# Patient Record
Sex: FEMALE | Race: WHITE | HISPANIC OR LATINO | Employment: PART TIME | URBAN - METROPOLITAN AREA
[De-identification: names, ages, dates, MRNs, and addresses within clinical notes are randomized per-mention and may not be internally consistent; named-entity substitution may affect disease eponyms.]

---

## 2017-01-17 ENCOUNTER — ALLSCRIPTS OFFICE VISIT (OUTPATIENT)
Dept: OTHER | Facility: OTHER | Age: 47
End: 2017-01-17

## 2017-07-10 ENCOUNTER — HOSPITAL ENCOUNTER (EMERGENCY)
Facility: HOSPITAL | Age: 47
Discharge: HOME | End: 2017-07-10
Payer: COMMERCIAL

## 2017-07-10 VITALS
TEMPERATURE: 98.9 F | DIASTOLIC BLOOD PRESSURE: 77 MMHG | RESPIRATION RATE: 18 BRPM | OXYGEN SATURATION: 100 % | WEIGHT: 160 LBS | SYSTOLIC BLOOD PRESSURE: 192 MMHG | HEART RATE: 84 BPM

## 2017-07-10 DIAGNOSIS — J20.9 BRONCHITIS, ACUTE: Primary | ICD-10-CM

## 2017-07-10 PROCEDURE — 99283 EMERGENCY DEPT VISIT LOW MDM: CPT

## 2017-09-20 ENCOUNTER — LAB REQUISITION (OUTPATIENT)
Dept: LAB | Facility: HOSPITAL | Age: 47
End: 2017-09-20
Payer: COMMERCIAL

## 2017-09-20 ENCOUNTER — ALLSCRIPTS OFFICE VISIT (OUTPATIENT)
Dept: OTHER | Facility: OTHER | Age: 47
End: 2017-09-20

## 2017-09-20 DIAGNOSIS — Z00.00 ENCOUNTER FOR GENERAL ADULT MEDICAL EXAMINATION WITHOUT ABNORMAL FINDINGS: ICD-10-CM

## 2017-09-20 DIAGNOSIS — Z72.51 HIGH RISK HETEROSEXUAL BEHAVIOR: ICD-10-CM

## 2017-09-20 DIAGNOSIS — Z12.31 ENCOUNTER FOR SCREENING MAMMOGRAM FOR MALIGNANT NEOPLASM OF BREAST: ICD-10-CM

## 2017-09-20 DIAGNOSIS — R00.2 PALPITATIONS: ICD-10-CM

## 2017-09-20 DIAGNOSIS — E55.9 VITAMIN D DEFICIENCY: ICD-10-CM

## 2017-09-20 DIAGNOSIS — R35.0 FREQUENCY OF MICTURITION: ICD-10-CM

## 2017-09-20 DIAGNOSIS — Z11.3 ENCOUNTER FOR SCREENING FOR INFECTIONS WITH PREDOMINANTLY SEXUAL MODE OF TRANSMISSION: ICD-10-CM

## 2017-09-20 DIAGNOSIS — M54.50 LOW BACK PAIN: ICD-10-CM

## 2017-09-20 DIAGNOSIS — R05.9 COUGH: ICD-10-CM

## 2017-09-20 DIAGNOSIS — R73.09 OTHER ABNORMAL GLUCOSE: ICD-10-CM

## 2017-09-20 DIAGNOSIS — E78.1 PURE HYPERGLYCERIDEMIA: ICD-10-CM

## 2017-09-20 LAB
BILIRUB UR QL STRIP: NORMAL
CLARITY UR: NORMAL
COLOR UR: YELLOW
GLUCOSE (HISTORICAL): NORMAL
HGB UR QL STRIP.AUTO: NORMAL
KETONES UR STRIP-MCNC: NORMAL MG/DL
LEUKOCYTE ESTERASE UR QL STRIP: NORMAL
NITRITE UR QL STRIP: NORMAL
PH UR STRIP.AUTO: 6.5 [PH]
PROT UR STRIP-MCNC: NORMAL MG/DL
SP GR UR STRIP.AUTO: 1.01
UROBILINOGEN UR QL STRIP.AUTO: 0.2

## 2017-09-20 PROCEDURE — 87491 CHLMYD TRACH DNA AMP PROBE: CPT | Performed by: NURSE PRACTITIONER

## 2017-09-20 PROCEDURE — 87591 N.GONORRHOEAE DNA AMP PROB: CPT | Performed by: NURSE PRACTITIONER

## 2017-09-22 LAB
CHLAMYDIA DNA CVX QL NAA+PROBE: NORMAL
N GONORRHOEA DNA GENITAL QL NAA+PROBE: NORMAL

## 2017-10-11 ENCOUNTER — APPOINTMENT (OUTPATIENT)
Dept: LAB | Facility: CLINIC | Age: 47
End: 2017-10-11
Payer: COMMERCIAL

## 2017-10-11 ENCOUNTER — TRANSCRIBE ORDERS (OUTPATIENT)
Dept: LAB | Facility: CLINIC | Age: 47
End: 2017-10-11

## 2017-10-11 DIAGNOSIS — Z11.3 ENCOUNTER FOR SCREENING FOR INFECTIONS WITH PREDOMINANTLY SEXUAL MODE OF TRANSMISSION: ICD-10-CM

## 2017-10-11 DIAGNOSIS — Z72.51 HIGH RISK HETEROSEXUAL BEHAVIOR: ICD-10-CM

## 2017-10-11 PROCEDURE — 86592 SYPHILIS TEST NON-TREP QUAL: CPT

## 2017-10-11 PROCEDURE — 87389 HIV-1 AG W/HIV-1&-2 AB AG IA: CPT

## 2017-10-11 PROCEDURE — 87340 HEPATITIS B SURFACE AG IA: CPT

## 2017-10-11 PROCEDURE — 36415 COLL VENOUS BLD VENIPUNCTURE: CPT

## 2017-10-12 LAB
HBV SURFACE AG SER QL: NORMAL
RPR SER QL: NORMAL

## 2017-10-13 LAB — HIV 1+2 AB+HIV1 P24 AG SERPL QL IA: NORMAL

## 2017-10-18 ENCOUNTER — HOSPITAL ENCOUNTER (OUTPATIENT)
Dept: MAMMOGRAPHY | Facility: HOSPITAL | Age: 47
Discharge: HOME | End: 2017-10-18
Payer: COMMERCIAL

## 2017-10-18 DIAGNOSIS — Z12.31 ENCOUNTER FOR SCREENING MAMMOGRAM FOR MALIGNANT NEOPLASM OF BREAST: ICD-10-CM

## 2017-10-18 PROCEDURE — G0202 SCR MAMMO BI INCL CAD: HCPCS

## 2017-10-19 ENCOUNTER — ALLSCRIPTS OFFICE VISIT (OUTPATIENT)
Dept: OTHER | Facility: OTHER | Age: 47
End: 2017-10-19

## 2017-10-19 ENCOUNTER — APPOINTMENT (OUTPATIENT)
Dept: LAB | Facility: HOSPITAL | Age: 47
End: 2017-10-19
Payer: COMMERCIAL

## 2017-10-19 DIAGNOSIS — R35.0 FREQUENCY OF MICTURITION: ICD-10-CM

## 2017-10-19 LAB
BACTERIA UR QL AUTO: ABNORMAL /HPF
BILIRUB UR QL STRIP: NEGATIVE
BILIRUB UR QL STRIP: NEGATIVE
CLARITY UR: ABNORMAL
CLARITY UR: NORMAL
COLOR UR: YELLOW
COLOR UR: YELLOW
GLUCOSE (HISTORICAL): NEGATIVE
GLUCOSE UR STRIP-MCNC: NEGATIVE MG/DL
HGB UR QL STRIP.AUTO: NEGATIVE
HGB UR QL STRIP.AUTO: NEGATIVE
HYALINE CASTS #/AREA URNS LPF: ABNORMAL /LPF
KETONES UR STRIP-MCNC: NEGATIVE MG/DL
KETONES UR STRIP-MCNC: NEGATIVE MG/DL
LEUKOCYTE ESTERASE UR QL STRIP: ABNORMAL
LEUKOCYTE ESTERASE UR QL STRIP: NORMAL
NITRITE UR QL STRIP: NEGATIVE
NITRITE UR QL STRIP: NEGATIVE
NON-SQ EPI CELLS URNS QL MICRO: ABNORMAL /HPF
PH UR STRIP.AUTO: 5 [PH]
PH UR STRIP.AUTO: 6 [PH] (ref 4.5–8)
PROT UR STRIP-MCNC: NEGATIVE MG/DL
PROT UR STRIP-MCNC: NEGATIVE MG/DL
RBC #/AREA URNS AUTO: ABNORMAL /HPF
SP GR UR STRIP.AUTO: 1.02
SP GR UR STRIP.AUTO: 1.03 (ref 1–1.03)
UROBILINOGEN UR QL STRIP.AUTO: 0.2
UROBILINOGEN UR QL STRIP.AUTO: 0.2 E.U./DL
WBC #/AREA URNS AUTO: ABNORMAL /HPF

## 2017-10-19 PROCEDURE — 81001 URINALYSIS AUTO W/SCOPE: CPT

## 2017-10-23 ENCOUNTER — ALLSCRIPTS OFFICE VISIT (OUTPATIENT)
Dept: OTHER | Facility: OTHER | Age: 47
End: 2017-10-23

## 2017-10-23 DIAGNOSIS — N63.0 MASS OF BREAST: ICD-10-CM

## 2017-10-25 ENCOUNTER — ALLSCRIPTS OFFICE VISIT (OUTPATIENT)
Dept: OTHER | Facility: OTHER | Age: 47
End: 2017-10-25

## 2017-10-25 ENCOUNTER — HOSPITAL ENCOUNTER (OUTPATIENT)
Dept: ULTRASOUND IMAGING | Facility: CLINIC | Age: 47
Discharge: HOME | End: 2017-10-25
Payer: COMMERCIAL

## 2017-10-25 ENCOUNTER — HOSPITAL ENCOUNTER (OUTPATIENT)
Dept: MAMMOGRAPHY | Facility: CLINIC | Age: 47
Discharge: HOME | End: 2017-10-25
Payer: COMMERCIAL

## 2017-10-25 DIAGNOSIS — R92.8 ABNORMAL SCREENING MAMMOGRAM: ICD-10-CM

## 2017-10-25 DIAGNOSIS — N63.0 MASS OF BREAST: ICD-10-CM

## 2017-10-25 PROCEDURE — G0279 TOMOSYNTHESIS, MAMMO: HCPCS

## 2017-10-25 PROCEDURE — G0206 DX MAMMO INCL CAD UNI: HCPCS

## 2017-10-25 PROCEDURE — 76642 ULTRASOUND BREAST LIMITED: CPT

## 2017-11-06 ENCOUNTER — APPOINTMENT (OUTPATIENT)
Dept: PHYSICAL THERAPY | Facility: CLINIC | Age: 47
End: 2017-11-06
Payer: COMMERCIAL

## 2017-11-06 DIAGNOSIS — M54.50 LOW BACK PAIN: ICD-10-CM

## 2017-11-06 PROCEDURE — G8979 MOBILITY GOAL STATUS: HCPCS

## 2017-11-06 PROCEDURE — 97161 PT EVAL LOW COMPLEX 20 MIN: CPT

## 2017-11-06 PROCEDURE — G8978 MOBILITY CURRENT STATUS: HCPCS

## 2017-11-09 ENCOUNTER — APPOINTMENT (OUTPATIENT)
Dept: PHYSICAL THERAPY | Facility: CLINIC | Age: 47
End: 2017-11-09
Payer: COMMERCIAL

## 2017-11-09 PROCEDURE — 97140 MANUAL THERAPY 1/> REGIONS: CPT

## 2017-11-09 PROCEDURE — 97110 THERAPEUTIC EXERCISES: CPT

## 2017-11-10 ENCOUNTER — GENERIC CONVERSION - ENCOUNTER (OUTPATIENT)
Dept: OTHER | Facility: OTHER | Age: 47
End: 2017-11-10

## 2017-11-14 ENCOUNTER — APPOINTMENT (OUTPATIENT)
Dept: PHYSICAL THERAPY | Facility: CLINIC | Age: 47
End: 2017-11-14
Payer: COMMERCIAL

## 2018-01-12 NOTE — PROGRESS NOTES
Assessment    1  Lumbago (724 2) (M54 5)   2  Encounter for preventive health examination (V70 0) (Z00 00)   3  Examination, physical, employee (V70 5) (Z02 89)    Plan  Lumbago    · Methocarbamol 750 MG Oral Tablet; TAKE 1 TABLET AT NIGHT FOR MUSCLE  SPASM   · Naproxen 500 MG Oral Tablet; TAKE ONE TABLET BY MOUTH TWICE DAILY  WITH MEALS   · *1 - [facility]  Consult  Status: Hold For - Scheduling   Requested for: 82MXB4901  Care Summary provided  : Yes   · Begin a walking program  Start with walks lasting 10 minutes and slowly work up to 30-40  minutes as pain allows ; Status:Complete;   Done: 92JFL5730   · Some eating tips that can help you lose weight ; Status:Complete;   Done: 63MOO7623   · We recommend that you avoid straining your back while lifting ; Status:Complete;   Done:  48OXV9484   · Urine Dip Non-Automated- POC; Status:Active - Perform Order; Requested  for:20Cas7893;     Discussion/Summary  health maintenance visit Currently, she eats a poor diet and has an inadequate exercise regimen  cervical cancer screening is current Breast cancer screening: mammogram is current  Colorectal cancer screening: colorectal cancer screening is not indicated  Advice and education were given regarding nutrition, aerobic exercise and weight loss  Work PE to be returned to her after PPD read  LBP has not interfered with her ability to work  I did recommend she start PT for pain  She will restart exercise program after back pain decreases  Patient is able to Self-Care  The patient was counseled regarding instructions for management, impressions  Self Referrals: No      Chief Complaint  yearly well exam  The Pt also c/o right lower that radiates to her upper right leg x 1 week  History of Present Illness  , Adult Female: The patient is being seen for a health maintenance evaluation  General Health: The patient's health since the last visit is described as good   She complains of vision problems  She denies hearing loss  Immunizations status: up to date  Lifestyle:  She does not have a healthy diet  She has weight concerns  She does not exercise regularly  She does not use tobacco  She denies alcohol use  She denies drug use  Reproductive health:  she reports normal menses  Screening: cancer screening reviewed and current  metabolic screening reviewed and current  risk screening reviewed and current  HPI: 56 y/o F here for work physical  She has been having lower back pain and right leg pain for last 1 week  She has been using cyclobenzaprine but it has not helped      Review of Systems    Constitutional: No fever, no chills, feels well, no tiredness, no recent weight gain or weight loss  Eyes: No complaints of eye pain, no red eyes, no eyesight problems, no discharge, no dry eyes, no itching of eyes  ENT: no complaints of earache, no loss of hearing, no nose bleeds, no nasal discharge, no sore throat, no hoarseness  Cardiovascular: No complaints of slow heart rate, no fast heart rate, no chest pain, no palpitations, no leg claudication, no lower extremity edema  Respiratory: No complaints of shortness of breath, no wheezing, no cough, no SOB on exertion, no orthopnea, no PND  Gastrointestinal: No complaints of abdominal pain, no constipation, no nausea or vomiting, no diarrhea, no bloody stools  Genitourinary: No complaints of dysuria, no incontinence, no pelvic pain, no dysmenorrhea, no vaginal discharge or bleeding  Musculoskeletal: No complaints of arthralgias, no myalgias, no joint swelling or stiffness, no limb pain or swelling  Integumentary: No complaints of skin rash or lesions, no itching, no skin wounds, no breast pain or lump  Neurological: No complaints of headache, no confusion, no convulsions, no numbness, no dizziness or fainting, no tingling, no limb weakness, no difficulty walking     Psychiatric: Not suicidal, no sleep disturbance, no anxiety or depression, no change in personality, no emotional problems  Endocrine: No complaints of proptosis, no hot flashes, no muscle weakness, no deepening of the voice, no feelings of weakness  Hematologic/Lymphatic: No complaints of swollen glands, no swollen glands in the neck, does not bleed easily, does not bruise easily  Active Problems    1  Abnormal blood sugar (790 29) (R73 09)   2  Anxiety (300 00) (F41 9)   3  Breast cancer screening (V76 10) (Z12 39)   4  Cervical cancer screening (V76 2) (Z12 4)   5  Encounter for gynecological examination without abnormal finding (V72 31) (Z01 419)   6  Encounter for routine gynecological examination with Papanicolaou smear of cervix   (V72 31,V76 2) (Z01 419)   7  Essential hypertriglyceridemia (272 1) (E78 1)   8  Fatigue (780 79) (R53 83)   9  Left knee pain (719 46) (M25 562)   10  Low vitamin D level (268 9) (E55 9)   11  Neck pain (723 1) (M54 2)   12  Need for pneumococcal vaccine (V03 82) (Z23)   13  Need for TD vaccine (V06 5) (Z23)   14  Need for Tdap vaccination (V06 1) (Z23)   15  Need for vaccination for pneumococcus (V03 82) (Z23)   16  Overweight (BMI 25 0-29 9) (278 02) (E66 3)   17  Pap smear for cervical cancer screening (V76 2) (Z12 4)   18  Denied: History of Patient Education - Self-Examination Of Breasts   19  Polyuria (788 42) (R35 8)   20  PPD screening test (V74 1) (Z11 1)   21  Routine Gynecological Exam With Cervical Pap Smear (V72 31)   22  Screening for depression (V79 0) (Z13 89)   23   Tingling (782 0) (R20 2)    Past Medical History    · History of Diabetes mellitus type 2 in obese (250 00,278 00) (E11 9,E66 9)   · History of Left otitis media (382 9) (H66 92)   · History of Otitis externa (380 10) (H60 90)    Surgical History    · History of  Section   · History of  Section   · History of Denial Of Any Significant Medical History   · Denied: History of Patient Education - Self-Examination Of Breasts   · History of Tubal Ligation During  Section    Family History  Mother    · Family history of Diabetes Mellitus (V18 0)   · Family history of Hypertension (V17 49)   · Family history of Lung Cancer (V16 1)  Family History    · Family history of Hyperlipidemia    Social History    · Being A Social Drinker   · Birth Control Not Practiced   · Caffeine Use   · Denied: History of Drug Use   · Former smoker (V15 82) (P20 359)   · Marital History -  (V61 03)   · Never A Smoker   · [employer] Aff[employer]   · Uses Safety Equipment - Seatbelts    Current Meds   1  Ferrous Sulfate 325 (65 Fe) MG Oral Tablet; TAKE 1 TABLET DAILY AS DIRECTED; Therapy: 00ODU5207 to (Evaluate:2017)  Requested for: 08AZQ6374; Last   Rx:59Kiz7218 Ordered   2  Vitamin D 2000 UNIT Oral Tablet; Take 1 tablet a day; Therapy: 33CSP8312 to (Roberto Carlos Lara)  Requested for: 89CIT8861; Last   Rx:11Ode5195 Ordered    Allergies    1  No Known Drug Allergies    Vitals   Recorded: 75Fjq2406 02:59PM   Temperature 97 6 F   Heart Rate 102   Respiration 18   Systolic 385   Diastolic 64   Height 5 ft 2 in   Weight 159 lb    BMI Calculated 29 08   BSA Calculated 1 73   O2 Saturation 100     Physical Exam    Constitutional   General appearance: No acute distress, well appearing and well nourished  Eyes   Conjunctiva and lids: No swelling, erythema or discharge  Pupils and irises: Equal, round and reactive to light  Ears, Nose, Mouth, and Throat   External inspection of ears and nose: Normal     Otoscopic examination: Tympanic membranes translucent with normal light reflex  Canals patent without erythema  Oropharynx: Normal with no erythema, edema, exudate or lesions  Pulmonary   Respiratory effort: No increased work of breathing or signs of respiratory distress  Auscultation of lungs: Clear to auscultation  Cardiovascular   Palpation of heart: Normal PMI, no thrills      Auscultation of heart: Normal rate and rhythm, normal S1 and S2, without murmurs  Examination of extremities for edema and/or varicosities: Normal     Abdomen   Abdomen: Non-tender, no masses  Liver and spleen: No hepatomegaly or splenomegaly  Lymphatic   Palpation of lymph nodes in neck: No lymphadenopathy  Musculoskeletal   Gait and station: Normal     Digits and nails: Normal without clubbing or cyanosis  Inspection/palpation of joints, bones, and muscles: Abnormal   (decreased ROM lumbar spine with stiffness of paraspinal muscles)   Skin   Skin and subcutaneous tissue: Normal without rashes or lesions  Neurologic   Cranial nerves: Cranial nerves 2-12 intact  Reflexes: 2+ and symmetric  Sensation: No sensory loss  Psychiatric   Orientation to person, place, and time: Normal     Mood and affect: Normal        Health Management  Breast cancer screening   Digital Bilateral Screening Mammogram With CAD; every 1 year; Last 94ECZ6584; Next  Due: 26Wfy2942; Overdue  Cervical cancer screening   (1) THIN PREP PAP WITH IMAGING; every 5 years; Last 54Vpu9178; Next Due:  15Orf6698; Active  Need for TD vaccine   Td; every 10 years; Next Due: 00Pfp4995; Overdue  Need for vaccination for pneumococcus   Pneumo (Pneumovax); every 5 years; Next Due: 58Wap0051;  Overdue    Future Appointments    Date/Time Provider Specialty Site   12/16/2016 10:00 AM Maile LIMON, Nurse Schedule  [place] [address]     Signatures   Electronically signed by : ALEK Awad; Dec 14 2016  8:18AM EST                       (Author)    Electronically signed by : AMBIKA Velázquez ; Dec 14 2016  8:28AM EST

## 2018-01-14 VITALS
OXYGEN SATURATION: 99 % | RESPIRATION RATE: 18 BRPM | HEIGHT: 62 IN | HEART RATE: 97 BPM | TEMPERATURE: 97.9 F | SYSTOLIC BLOOD PRESSURE: 150 MMHG | BODY MASS INDEX: 31 KG/M2 | DIASTOLIC BLOOD PRESSURE: 78 MMHG | WEIGHT: 168.44 LBS

## 2018-01-14 VITALS
SYSTOLIC BLOOD PRESSURE: 128 MMHG | WEIGHT: 166 LBS | DIASTOLIC BLOOD PRESSURE: 78 MMHG | BODY MASS INDEX: 30.55 KG/M2 | HEIGHT: 62 IN

## 2018-01-15 VITALS
TEMPERATURE: 99.8 F | HEART RATE: 98 BPM | OXYGEN SATURATION: 99 % | DIASTOLIC BLOOD PRESSURE: 78 MMHG | HEIGHT: 62 IN | SYSTOLIC BLOOD PRESSURE: 124 MMHG | WEIGHT: 154 LBS | RESPIRATION RATE: 20 BRPM | BODY MASS INDEX: 28.34 KG/M2

## 2018-01-16 NOTE — PROGRESS NOTES
Chief Complaint  Pt here to get PPD administer  PPD administer in [facility]  MD LARRY      Active Problems    1  Abnormal blood sugar (790 29) (R73 09)   2  Anxiety (300 00) (F41 9)   3  Breast lump (611 72) (N63 0)   4  Chronic cough (786 2) (R05)   5  Depression screening (V79 0) (Z13 89)   6  Encounter for gynecological examination without abnormal finding (V72 31) (Z01 419)   7  Encounter for PPD test (V74 1) (Z11 1)   8  Encounter for routine gynecological examination with Papanicolaou smear of cervix   (V72 31,V76 2) (Z01 419)   9  Encounter for screening mammogram for malignant neoplasm of breast (V76 12)   (Z12 31)   10  Essential hypertriglyceridemia (272 1) (E78 1)   11  Hearing loss of left ear (389 9) (H91 92)   12  Heart palpitations (785 1) (R00 2)   13  High risk sexual behavior (V69 2) (Z72 51)   14  Left knee pain (719 46) (M25 562)   15  Low vitamin D level (268 9) (E55 9)   16  Lumbago (724 2) (M54 5)   17  Neck pain (723 1) (M54 2)   18  Need for influenza vaccination (V04 81) (Z23)   19  Need for pneumococcal vaccine (V03 82) (Z23)   20  Need for TD vaccine (V06 5) (Z23)   21  Need for Tdap vaccination (V06 1) (Z23)   22  Overweight (BMI 25 0-29 9) (278 02) (E66 3)   23  Denied: History of Patient Education - Self-Examination Of Breasts   24  Physical exam, pre-employment (V70 5) (Z02 1)   25  Polyuria (788 42) (R35 8)   26  Routine Gynecological Exam With Cervical Pap Smear (V72 31)   27  Screen for STD (sexually transmitted disease) (V74 5) (Z11 3)   28  Urinary frequency (788 41) (R35 0)    Current Meds   1  No Reported Medications Recorded    Allergies    1   No Known Drug Allergies    Plan  Encounter for PPD test    · PPD    Signatures   Electronically signed by : ALEK Lau; Oct 24 2017  9:17AM EST                          Electronically signed by : AMBIKA Wong ; Oct 26 2017 10:59AM EST

## 2018-01-17 NOTE — PROGRESS NOTES
Chief Complaint  pt here today for her PPD read results are NEGATIVE @0MM SR16      Active Problems    1  Abnormal blood sugar (790 29) (R73 09)   2  Anxiety (300 00) (F41 9)   3  Breast cancer screening (V76 10) (Z12 39)   4  Cervical cancer screening (V76 2) (Z12 4)   5  Encounter for gynecological examination without abnormal finding (V72 31) (Z01 419)   6  Encounter for routine gynecological examination with Papanicolaou smear of cervix   (V72 31,V76 2) (Z01 419)   7  Essential hypertriglyceridemia (272 1) (E78 1)   8  Examination, physical, employee (V70 5) (Z02 89)   9  Fatigue (780 79) (R53 83)   10  Left knee pain (719 46) (M25 562)   11  Low vitamin D level (268 9) (E55 9)   12  Lumbago (724 2) (M54 5)   13  Neck pain (723 1) (M54 2)   14  Need for pneumococcal vaccine (V03 82) (Z23)   15  Need for TD vaccine (V06 5) (Z23)   16  Need for Tdap vaccination (V06 1) (Z23)   17  Need for vaccination for pneumococcus (V03 82) (Z23)   18  Overweight (BMI 25 0-29 9) (278 02) (E66 3)   19  Pap smear for cervical cancer screening (V76 2) (Z12 4)   20  Denied: History of Patient Education - Self-Examination Of Breasts   21  Polyuria (788 42) (R35 8)   22  PPD screening test (V74 1) (Z11 1)   23  Routine Gynecological Exam With Cervical Pap Smear (V72 31)   24  Screening for depression (V79 0) (Z13 89)   25  Tingling (782 0) (R20 2)    Current Meds   1  Ferrous Sulfate 325 (65 Fe) MG Oral Tablet; TAKE 1 TABLET DAILY AS DIRECTED; Therapy: 94ZQG9564 to (Evaluate:15Apr2017)  Requested for: 53BNA5354; Last   Rx:24Haq5422 Ordered   2  Methocarbamol 750 MG Oral Tablet; TAKE 1 TABLET AT NIGHT FOR MUSCLE SPASM; Therapy: 86QFY9371 to (Evaluate:12Jan2017)  Requested for: 29Qvq7434; Last   Rx:59Odr1489 Ordered   3  Naproxen 500 MG Oral Tablet; TAKE ONE TABLET BY MOUTH TWICE DAILY WITH   MEALS; Therapy: 35ZXC1611 to (Evaluate:12Jan2017)  Requested for: 91Asv3626; Last   Rx:97Jwc9182 Ordered   4   Vitamin D 2000 UNIT Oral Tablet; Take 1 tablet a day; Therapy: 02UBM5009 to (Perico Drew)  Requested for: 13AOM2881; Last   Rx:17Oct2016 Ordered    Allergies    1   No Known Drug Allergies    Plan  Lumbago    · *1 - [facility]  Consult  Status: Active  Requested for:  91QDM2269  Care Summary provided  : Yes  PPD screening test    · PPD    Signatures   Electronically signed by : ALEK Nash; Dec 19 2016 12:29PM EST                       (Author)    Electronically signed by : AMBIKA Story ; Dec 19 2016  2:02PM EST

## 2018-01-18 NOTE — PROGRESS NOTES
Assessment    1  Chronic cough (786 2) (R05)   2  Heart palpitations (785 1) (R00 2)   3  Hearing loss of left ear (389 9) (H91 92)   4  Abnormal blood sugar (790 29) (R73 09)   5  Urinary frequency (788 41) (R35 0)    Plan  Abnormal blood sugar    · (1) COMPREHENSIVE METABOLIC PANEL; Status:Active; Requested for:19Oct2017;   Chronic cough    · * XR CHEST PA & LATERAL; Status:Active; Requested TBE:[id number];    · Complete PFT; Status:Active; Requested KKW:[id number];   Depression screening    · *VB-Depression Screening; Status:Complete;   Done: 86CGO0232 01:08PM  Essential hypertriglyceridemia    · (1) LIPID PANEL, FASTING; Status:Active; Requested Merged with [facility]:[id number]; Health Maintenance, Physical exam, pre-employment    · Urine Dip Non-Automated- POC; Status:Complete;   Done: 14FWS7657 01:15PM  Hearing loss of left ear    · *1 - [facility]  *  Status: Hold For - Scheduling   Requested for: 22RGQ1486  Care Summary provided  : Yes  Heart palpitations    · (1) CBC/PLT/DIFF; Status:Active; Requested for:19Oct2017;   Low vitamin D level    · (1) VITAMIN D 25-HYDROXY; Status:Active; Requested ISU:[id number];   Lumbago    · *1 - [facility]  *  Status: Active  Requested for: 87JOH4469  Care Summary provided  : Yes  Need for influenza vaccination    · Stop: Flulaval Quadrivalent 0 5 ML Intramuscular Suspension Prefilled  Syringe   · Flulaval Quadrivalent Intramuscular Suspension  Urinary frequency    · (1) URINALYSIS w URINE C/S REFLEX (will reflex a microscopy if leukocytes, occult  blood, or nitrites are not within normal limits); Status:Active; Requested [facility]:[id number];     Discussion/Summary  health maintenance visit Currently, she eats a poor diet and has an inadequate exercise regimen  cervical cancer screening is current Breast cancer screening: mammogram is current  Colorectal cancer screening: colorectal cancer screening is not indicated       We will get blood work done to make sure she is not diabetic  After blood work should be referred to nutritionist  We did discuss briefly that she should try decrease carbohydrates and Rice her diet  She was discharged start physical therapy for back pain  She will need approximately on as-needed basis  She is referred to [facility] for hearing loss in the left side  It is unlikely to be anything due to fix this problem at this time  Possible side effects of new medications were reviewed with the patient/guardian today  The treatment plan was reviewed with the patient/guardian  The patient/guardian understands and agrees with the treatment plan     Self Referrals: No      Chief Complaint  pt here today for physical for work will need PPD placement states we have her form? History of Present Illness  HM, Adult Female: The patient is being seen for a health maintenance evaluation  General Health: The patient's health since the last visit is described as good  She denies vision problems  She has hearing loss  Lifestyle:  She consumes a diverse and healthy diet  She has weight concerns  She does not exercise regularly  She does not use tobacco  She denies drug use  Reproductive health: the patient is premenopausal    Screening:   HPI: Daquan Lozano is a 51 y/o female, w/a history of iron def anemia and pre-diabetes, presenting for a work physical and is complaining of fatigue x1 month, weight gain, episodes of hypoglycemia and persistent dry cough  She states she is trying to lose weight and is eating less but is eating multiple times a day  Experiencing episodes of hypoglycemia , where she has blurry vision, weakness, palpitation, and SOB  She then eats and feels better  Pt interested in nutritionist referral    Pt stopped smoking 2 years ago and continues having dry cough multiple times a day  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   She states the cough occurs during the day and at night when she is home  She also sometimes when she is walking quickly to feel short of breath  She also needs to get lower back pain which is worse on right  He does sometimes take naproxen which helps her with pain  Occasionally the pain radiates into the hip area  No lower extremity neuropathy symptoms  She has not had any treatment for this  She also notes left-sided hearing loss  This occurred when she had an ear infection which is pregnant  This was about 20 years ago  Review of Systems    Constitutional: recent 15 lb weight gain and feeling tired, but no fever and no chills  Eyes: eyesight problems, but no eye pain, eyes not red and no itching of the eyes  ENT: hearing loss, but no complaints of earache, no loss of hearing, no nose bleeds, no nasal discharge, no sore throat, no hoarseness  Cardiovascular: No complaints of slow heart rate, no fast heart rate, no chest pain, no palpitations, no leg claudication, no lower extremity edema  The patient presents with complaints of cough  Her symptoms are caused by allergen contact  Symptoms are made worse by smoke exposure  Symptoms are unchanged  Pertinent Medical History: environmental allergies and obesity, but not asthma  Family History: asthma  Gastrointestinal: No complaints of abdominal pain, no constipation, no nausea or vomiting, no diarrhea, no bloody stools  Genitourinary: No complaints of dysuria, no incontinence, no pelvic pain, no dysmenorrhea, no vaginal discharge or bleeding  Musculoskeletal: No complaints of arthralgias, no myalgias, no joint swelling or stiffness, no limb pain or swelling  Integumentary: No complaints of skin rash or lesions, no itching, no skin wounds, no breast pain or lump  Neurological: dizziness, but no numbness, no tingling, no confusion, no limb weakness, no convulsions, no fainting and no difficulty walking     Psychiatric: Not suicidal, no sleep disturbance, no anxiety or depression, no change in personality, no emotional problems  Endocrine: No complaints of proptosis, no hot flashes, no muscle weakness, no deepening of the voice, no feelings of weakness  Hematologic/Lymphatic: No complaints of swollen glands, no swollen glands in the neck, does not bleed easily, does not bruise easily  Active Problems    1  Abnormal blood sugar (790 29) (R73 09)   2  Anxiety (300 00) (F41 9)   3  Encounter for gynecological examination without abnormal finding (V72 31) (Z01 419)   4  Encounter for routine gynecological examination with Papanicolaou smear of cervix   (V72 31,V76 2) (Z01 419)   5  Encounter for screening mammogram for malignant neoplasm of breast (V76 12)   (Z12 31)   6  Essential hypertriglyceridemia (272 1) (E78 1)   7  High risk sexual behavior (V69 2) (Z72 51)   8  Left knee pain (719 46) (M25 562)   9  Low vitamin D level (268 9) (E55 9)   10  Lumbago (724 2) (M54 5)   11  Neck pain (723 1) (M54 2)   12  Need for pneumococcal vaccine (V03 82) (Z23)   13  Need for TD vaccine (V06 5) (Z23)   14  Need for Tdap vaccination (V06 1) (Z23)   15  Overweight (BMI 25 0-29 9) (278 02) (E66 3)   16  Denied: History of Patient Education - Self-Examination Of Breasts   17  Polyuria (788 42) (R35 8)   18  Routine Gynecological Exam With Cervical Pap Smear (V72 31)   19   Screen for STD (sexually transmitted disease) (V74 5) (Z11 3)    Past Medical History    · History of Diabetes mellitus type 2 in obese (250 00,278 00) (E11 69,E66 9)   · History of Left otitis media (382 9) (H66 92)   · History of Otitis externa (380 10) (H60 90)    Surgical History    · History of  Section   · History of  Section   · History of Denial Of Any Significant Medical History   · Denied: History of Patient Education - Self-Examination Of Breasts   · History of Tubal Ligation During  Section    Family History  Mother    · Family history of Diabetes Mellitus (V18 0)   · Family history of Hypertension (V17 49)   · Family history of Lung Cancer (V16 1)  Family History    · Family history of Hyperlipidemia    Social History    · Being A Social Drinker   · Birth Control Not Practiced   · Caffeine Use   · Denied: History of Drug Use   · Former smoker (V15 82) (D59 608)   · High risk sexual behavior (V69 2) (Z72 51)   · Marital History -  (V61 03)   · Never A Smoker   · [address]   · Uses Safety Equipment - Seatbelts    Current Meds   1  No Reported Medications Recorded    Allergies    1  No Known Drug Allergies    Vitals   Recorded: 19Oct2017 01:02PM   Temperature 97 9 F, Tympanic   Heart Rate 97   Respiration 18   Systolic 498, LUE, Sitting   Diastolic 78, LUE, Sitting   Height 5 ft 2 in   Weight 168 lb 7 oz   BMI Calculated 30 81   BSA Calculated 1 78   O2 Saturation 99     Physical Exam    Constitutional   General appearance: No acute distress, well appearing and well nourished  Head and Face   Head and face: Normal     Palpation of the face and sinuses: No sinus tenderness  Eyes   Conjunctiva and lids: No swelling, erythema or discharge  Pupils and irises: Equal, round, reactive to light  Ears, Nose, Mouth, and Throat   External inspection of ears and nose: Normal     Lips, teeth, and gums: Normal, good dentition  Oropharynx: Normal with no erythema, edema, exudate or lesions  Neck   Neck: Supple, symmetric, trachea midline, no masses  Thyroid: Normal, no thyromegaly  Pulmonary   Respiratory effort: No increased work of breathing or signs of respiratory distress  Cardiovascular   Palpation of heart: Normal PMI, no thrills  Auscultation of heart: Normal rate and rhythm, normal S1 and S2, no murmurs  Examination of extremities for edema and/or varicosities: Normal     Abdomen   Abdomen: Non-tender, no masses  Liver and spleen: No hepatomegaly or splenomegaly  Lymphatic   Palpation of lymph nodes in neck: No lymphadenopathy      Musculoskeletal Gait and station: Normal     Digits and nails: Normal without clubbing or cyanosis  Joints, bones, and muscles: Abnormal   (Tender over right paraspinal muscles in the thoracic and lumbar region)   Range of motion: Normal     Stability: Normal     Skin   Skin and subcutaneous tissue: Normal without rashes or lesions  Psychiatric   Judgment and insight: Normal     Orientation to person, place, and time: Normal     Mood and affect: Normal        Results/Data  Urine Dip Non-Automated- POC [id number] 01:15PM PicaRiley     Test Name Result Flag Reference   Color Yellow     Clarity Transparent     Leukocytes SMALL     Nitrite NEGATIVE     Blood NEGATIVE     Bilirubin NEGATIVE     Urobilinogen 0 2     Protein NEGATIVE     Ph 5 0     Specific Gravity 1 025     Ketone NEGATIVE     Glucose NEGATIVE       *VB-Depression Screening [id number] 01:08PM Pica, Richele     Test Name Result Flag Reference   Depression Scale Result      Depression Screen - Negative For Symptoms     PHQ-2 Adult Depression Screening 12SEX6023 01:06PM User s     Test Name Result Flag Reference   PHQ-2 Adult Depression Score 0     Over the last two weeks, how often have you been bothered by any of the following problems? Little interest or pleasure in doing things: Not at all - 0  Feeling down, depressed, or hopeless: Not at all - 0   PHQ-2 Adult Depression Screening Negative         Procedure    Procedure: Hearing Acuity Test    Indication: Routine screeing  Audiometry:   Hearing in the right ear: 20 decibals at 500 hertz, 30 decibals at 1000 hertz, 20 decibals at 2000 hertz, 20 decibals at 4000 hertz and 35 decibals at 6000 hertz  Hearing in the left ear: 65 decibals at 500 hertz, 55 decibals at 1000 hertz, 55 decibals at 2000 hertz, 65 decibals at 4000 hertz and 55 decibals at 6000 hertz  The patient was cooperative, but Tolerated the procedure well  Procedure: Visual Acuity Test    Indication: routine screening     Inforrmation supplied by laura a Snellen chart  Results: 20/50-1 in the right eye without corrective device, 20/40-2 in the left eye without corrective device   The patient was cooperative, but tolerated the procedure well  Health Management  History of Breast cancer screening   Digital Bilateral Screening Mammogram With CAD; every 1 year; Last 60HWW2461; Next  Due: 76Vfv5881; Overdue  History of Cervical cancer screening   (1) THIN PREP PAP WITH IMAGING; every 5 years; Last 21Apr2016; Next Due:  37Eeu8242; Active  History of pneumococcal vaccination   Pneumo (Pneumovax); every 5 years; Next Due: 99Knc9704; Overdue  Need for TD vaccine   Td; every 10 years; Next Due: 01Stg1808;  Overdue    Signatures   Electronically signed by : ALEK Coombs; Oct 19 2017  2:57PM EST                       (Author)    Electronically signed by : AMBIKA Dunaway ; Oct 19 2017  3:46PM EST

## 2018-01-18 NOTE — PROGRESS NOTES
Chief Complaint  pT HERE TO GET PPD READ  PPD NEGATIVE @ 0 MM  MD LARRY      Active Problems    1  Abnormal blood sugar (790 29) (R73 09)   2  Anxiety (300 00) (F41 9)   3  Breast lump (611 72) (N63 0)   4  Chronic cough (786 2) (R05)   5  Depression screening (V79 0) (Z13 89)   6  Encounter for gynecological examination without abnormal finding (V72 31) (Z01 419)   7  Encounter for PPD test (V74 1) (Z11 1)   8  Encounter for routine gynecological examination with Papanicolaou smear of cervix   (V72 31,V76 2) (Z01 419)   9  Encounter for screening mammogram for malignant neoplasm of breast (V76 12)   (Z12 31)   10  Essential hypertriglyceridemia (272 1) (E78 1)   11  Hearing loss of left ear (389 9) (H91 92)   12  Heart palpitations (785 1) (R00 2)   13  High risk sexual behavior (V69 2) (Z72 51)   14  Left knee pain (719 46) (M25 562)   15  Low vitamin D level (268 9) (E55 9)   16  Lumbago (724 2) (M54 5)   17  Neck pain (723 1) (M54 2)   18  Need for influenza vaccination (V04 81) (Z23)   19  Need for pneumococcal vaccine (V03 82) (Z23)   20  Need for TD vaccine (V06 5) (Z23)   21  Need for Tdap vaccination (V06 1) (Z23)   22  Overweight (BMI 25 0-29 9) (278 02) (E66 3)   23  Denied: History of Patient Education - Self-Examination Of Breasts   24  Physical exam, pre-employment (V70 5) (Z02 1)   25  Polyuria (788 42) (R35 8)   26  Routine Gynecological Exam With Cervical Pap Smear (V72 31)   27  Screen for STD (sexually transmitted disease) (V74 5) (Z11 3)   28  Urinary frequency (788 41) (R35 0)    Current Meds   1  No Reported Medications Recorded    Allergies    1   No Known Drug Allergies    Plan  Encounter for PPD test    · PPD  PMH: PPD screening test    · PPD    Signatures   Electronically signed by : AMBIKA Mendoza ; Oct 25 2017  5:05PM EST                       (Author)

## 2018-02-01 ENCOUNTER — OFFICE VISIT (OUTPATIENT)
Dept: FAMILY MEDICINE CLINIC | Facility: CLINIC | Age: 48
End: 2018-02-01
Payer: COMMERCIAL

## 2018-02-01 VITALS
BODY MASS INDEX: 30.69 KG/M2 | HEIGHT: 62 IN | HEART RATE: 88 BPM | TEMPERATURE: 98.7 F | WEIGHT: 166.8 LBS | OXYGEN SATURATION: 98 % | DIASTOLIC BLOOD PRESSURE: 80 MMHG | SYSTOLIC BLOOD PRESSURE: 122 MMHG | RESPIRATION RATE: 18 BRPM

## 2018-02-01 DIAGNOSIS — M54.9 UPPER BACK PAIN: ICD-10-CM

## 2018-02-01 DIAGNOSIS — R05.3 CHRONIC COUGH: Primary | ICD-10-CM

## 2018-02-01 DIAGNOSIS — M54.50 CHRONIC BILATERAL LOW BACK PAIN WITHOUT SCIATICA: ICD-10-CM

## 2018-02-01 DIAGNOSIS — M54.2 NECK PAIN: ICD-10-CM

## 2018-02-01 DIAGNOSIS — E78.1 ESSENTIAL HYPERTRIGLYCERIDEMIA: ICD-10-CM

## 2018-02-01 DIAGNOSIS — G89.29 CHRONIC BILATERAL LOW BACK PAIN WITHOUT SCIATICA: ICD-10-CM

## 2018-02-01 DIAGNOSIS — R79.89 LOW VITAMIN D LEVEL: ICD-10-CM

## 2018-02-01 DIAGNOSIS — R53.83 FATIGUE, UNSPECIFIED TYPE: ICD-10-CM

## 2018-02-01 DIAGNOSIS — Z23 NEED FOR HEPATITIS B VACCINATION: ICD-10-CM

## 2018-02-01 PROBLEM — R35.0 INCREASED FREQUENCY OF URINATION: Status: ACTIVE | Noted: 2017-10-19

## 2018-02-01 PROBLEM — H91.92 HEARING LOSS OF LEFT EAR: Status: ACTIVE | Noted: 2017-10-19

## 2018-02-01 PROBLEM — R00.2 HEART PALPITATIONS: Status: ACTIVE | Noted: 2017-10-19

## 2018-02-01 PROCEDURE — 90471 IMMUNIZATION ADMIN: CPT

## 2018-02-01 PROCEDURE — 99213 OFFICE O/P EST LOW 20 MIN: CPT | Performed by: PHYSICIAN ASSISTANT

## 2018-02-01 PROCEDURE — 90746 HEPB VACCINE 3 DOSE ADULT IM: CPT

## 2018-02-01 RX ORDER — BACLOFEN 10 MG/1
10 TABLET ORAL
Qty: 30 TABLET | Refills: 1 | Status: SHIPPED | OUTPATIENT
Start: 2018-02-01 | End: 2018-09-11

## 2018-02-01 RX ORDER — OMEPRAZOLE 20 MG/1
20 CAPSULE, DELAYED RELEASE ORAL DAILY
Qty: 30 CAPSULE | Refills: 1 | Status: SHIPPED | OUTPATIENT
Start: 2018-02-01 | End: 2019-02-11

## 2018-02-01 NOTE — PATIENT INSTRUCTIONS

## 2018-02-01 NOTE — PROGRESS NOTES
Assessment/Plan:    No problem-specific Assessment & Plan notes found for this encounter  Problem List Items Addressed This Visit     None            Subjective:      Patient ID: Jennifer Sanchez is a 52 y o  female  51 y/o F here for follow up of fatigue and chronic cough  Seen here for both in October and had CXR, labs and PFTs ordered which were not completed  She tried benadryl and it did not help  She quit smoking about 3 years ago  Notes SOB with exertion  Also has been tired  Eating ice frequently  Hx of iron and vitamin d def  She does get heavy periods and it lasts for about 5 days with first day having blood clots  Also still continues to get back pain  Worse at night and when laying/sitting unsupported  It is upper and lower back  No radiculopathy  Tried PT x 2 but not doing exercises  Fatigue   This is a chronic problem  Associated symptoms include coughing, fatigue, headaches (mild headaches) and nausea  Pertinent negatives include no abdominal pain, arthralgias, chest pain, chills, myalgias, rash, vertigo or vomiting  Sore throat: when walking quickly  Treatments tried: eating ice frequently  Cough   This is a chronic problem  The current episode started more than 1 year ago (2 years)  The cough is non-productive  Associated symptoms include headaches (mild headaches) and shortness of breath  Pertinent negatives include no chest pain, chills, ear pain, myalgias, rash or wheezing  Sore throat: when walking quickly  The following portions of the patient's history were reviewed and updated as appropriate:   She  has no past medical history on file  She  does not have any pertinent problems on file  She  has no past surgical history on file  Her family history is not on file  She  reports that she has quit smoking  She has never used smokeless tobacco  She reports that she does not drink alcohol or use drugs  No current outpatient prescriptions on file       No current facility-administered medications for this visit  No current outpatient prescriptions on file prior to visit  No current facility-administered medications on file prior to visit  She has No Known Allergies       Review of Systems   Constitutional: Positive for fatigue  Negative for activity change, appetite change, chills and unexpected weight change  HENT: Negative for dental problem, ear pain and hearing loss  Sore throat: when walking quickly  Eyes: Negative for visual disturbance  Respiratory: Positive for cough and shortness of breath  Negative for wheezing  Cardiovascular: Negative for chest pain  Gastrointestinal: Positive for nausea  Negative for abdominal pain, constipation, diarrhea and vomiting  Genitourinary: Negative for difficulty urinating and dysuria  Musculoskeletal: Positive for back pain  Negative for arthralgias and myalgias  Skin: Negative for rash  Neurological: Positive for headaches (mild headaches)  Negative for dizziness and vertigo  Psychiatric/Behavioral: Negative for behavioral problems  Objective:     Physical Exam   Constitutional: She is oriented to person, place, and time  She appears well-developed and well-nourished  No distress  HENT:   Head: Normocephalic and atraumatic  Right Ear: External ear normal    Left Ear: External ear normal    Eyes: Conjunctivae are normal    Neck: Normal range of motion  Neck supple  No thyromegaly present  Cardiovascular: Normal rate, regular rhythm and normal heart sounds  No murmur heard  Pulmonary/Chest: Breath sounds normal  No respiratory distress  She has no wheezes  Abdominal: Soft  Bowel sounds are normal  There is no tenderness  Musculoskeletal: Normal range of motion  She exhibits no edema, tenderness or deformity  Lymphadenopathy:     She has no cervical adenopathy  Neurological: She is alert and oriented to person, place, and time  Psychiatric: She has a normal mood and affect  Her behavior is normal    Nursing note and vitals reviewed

## 2018-02-02 NOTE — PROGRESS NOTES
I have reviewed the notes, assessments, and/or procedures performed by AP, I concur with her/his documentation of Artice Page

## 2018-02-19 ENCOUNTER — APPOINTMENT (OUTPATIENT)
Dept: LAB | Facility: HOSPITAL | Age: 48
End: 2018-02-19
Payer: COMMERCIAL

## 2018-02-19 ENCOUNTER — HOSPITAL ENCOUNTER (OUTPATIENT)
Dept: RADIOLOGY | Facility: HOSPITAL | Age: 48
Discharge: HOME | End: 2018-02-19
Payer: COMMERCIAL

## 2018-02-19 DIAGNOSIS — R53.83 FATIGUE, UNSPECIFIED TYPE: ICD-10-CM

## 2018-02-19 DIAGNOSIS — R79.89 LOW VITAMIN D LEVEL: ICD-10-CM

## 2018-02-19 DIAGNOSIS — E78.1 ESSENTIAL HYPERTRIGLYCERIDEMIA: ICD-10-CM

## 2018-02-19 DIAGNOSIS — R05.3 CHRONIC COUGH: ICD-10-CM

## 2018-02-19 LAB
25(OH)D3 SERPL-MCNC: 8.3 NG/ML (ref 30–100)
ALBUMIN SERPL BCP-MCNC: 3.6 G/DL (ref 3.5–5)
ALP SERPL-CCNC: 56 U/L (ref 46–116)
ALT SERPL W P-5'-P-CCNC: 18 U/L (ref 12–78)
ANION GAP SERPL CALCULATED.3IONS-SCNC: 8 MMOL/L (ref 4–13)
AST SERPL W P-5'-P-CCNC: 16 U/L (ref 5–45)
BASOPHILS # BLD AUTO: 0.04 THOUSANDS/ΜL (ref 0–0.1)
BASOPHILS NFR BLD AUTO: 1 % (ref 0–1)
BILIRUB SERPL-MCNC: 0.33 MG/DL (ref 0.2–1)
BUN SERPL-MCNC: 12 MG/DL (ref 5–25)
CALCIUM SERPL-MCNC: 9.1 MG/DL (ref 8.3–10.1)
CHLORIDE SERPL-SCNC: 104 MMOL/L (ref 100–108)
CHOLEST SERPL-MCNC: 214 MG/DL (ref 50–200)
CO2 SERPL-SCNC: 29 MMOL/L (ref 21–32)
CREAT SERPL-MCNC: 0.73 MG/DL (ref 0.6–1.3)
EOSINOPHIL # BLD AUTO: 0.12 THOUSAND/ΜL (ref 0–0.61)
EOSINOPHIL NFR BLD AUTO: 2 % (ref 0–6)
ERYTHROCYTE [DISTWIDTH] IN BLOOD BY AUTOMATED COUNT: 15.2 % (ref 11.6–15.1)
FERRITIN SERPL-MCNC: 2 NG/ML (ref 8–388)
GFR SERPL CREATININE-BSD FRML MDRD: 98 ML/MIN/1.73SQ M
GLUCOSE P FAST SERPL-MCNC: 97 MG/DL (ref 65–99)
HCT VFR BLD AUTO: 31.2 % (ref 34.8–46.1)
HDLC SERPL-MCNC: 63 MG/DL (ref 40–60)
HGB BLD-MCNC: 9.8 G/DL (ref 11.5–15.4)
IRON SATN MFR SERPL: 5 %
IRON SERPL-MCNC: 26 UG/DL (ref 50–170)
LDLC SERPL CALC-MCNC: 99 MG/DL (ref 0–100)
LYMPHOCYTES # BLD AUTO: 1.66 THOUSANDS/ΜL (ref 0.6–4.47)
LYMPHOCYTES NFR BLD AUTO: 28 % (ref 14–44)
MCH RBC QN AUTO: 26.8 PG (ref 26.8–34.3)
MCHC RBC AUTO-ENTMCNC: 31.4 G/DL (ref 31.4–37.4)
MCV RBC AUTO: 85 FL (ref 82–98)
MONOCYTES # BLD AUTO: 0.54 THOUSAND/ΜL (ref 0.17–1.22)
MONOCYTES NFR BLD AUTO: 9 % (ref 4–12)
NEUTROPHILS # BLD AUTO: 3.49 THOUSANDS/ΜL (ref 1.85–7.62)
NEUTS SEG NFR BLD AUTO: 60 % (ref 43–75)
PLATELET # BLD AUTO: 440 THOUSANDS/UL (ref 149–390)
PMV BLD AUTO: 10.4 FL (ref 8.9–12.7)
POTASSIUM SERPL-SCNC: 4.4 MMOL/L (ref 3.5–5.3)
PROT SERPL-MCNC: 7.7 G/DL (ref 6.4–8.2)
RBC # BLD AUTO: 3.66 MILLION/UL (ref 3.81–5.12)
SODIUM SERPL-SCNC: 141 MMOL/L (ref 136–145)
TIBC SERPL-MCNC: 499 UG/DL (ref 250–450)
TRIGL SERPL-MCNC: 258 MG/DL
VIT B12 SERPL-MCNC: 241 PG/ML (ref 100–900)
WBC # BLD AUTO: 5.85 THOUSAND/UL (ref 4.31–10.16)

## 2018-02-19 PROCEDURE — 80061 LIPID PANEL: CPT

## 2018-02-19 PROCEDURE — 82728 ASSAY OF FERRITIN: CPT

## 2018-02-19 PROCEDURE — 83550 IRON BINDING TEST: CPT

## 2018-02-19 PROCEDURE — 80053 COMPREHEN METABOLIC PANEL: CPT

## 2018-02-19 PROCEDURE — 82306 VITAMIN D 25 HYDROXY: CPT

## 2018-02-19 PROCEDURE — 83540 ASSAY OF IRON: CPT

## 2018-02-19 PROCEDURE — 36415 COLL VENOUS BLD VENIPUNCTURE: CPT

## 2018-02-19 PROCEDURE — 71046 X-RAY EXAM CHEST 2 VIEWS: CPT

## 2018-02-19 PROCEDURE — 82607 VITAMIN B-12: CPT

## 2018-02-19 PROCEDURE — 85025 COMPLETE CBC W/AUTO DIFF WBC: CPT

## 2018-02-20 DIAGNOSIS — D50.9 IRON DEFICIENCY ANEMIA, UNSPECIFIED IRON DEFICIENCY ANEMIA TYPE: ICD-10-CM

## 2018-02-20 DIAGNOSIS — E78.1 ESSENTIAL HYPERTRIGLYCERIDEMIA: ICD-10-CM

## 2018-02-20 DIAGNOSIS — E53.8 B12 DEFICIENCY: ICD-10-CM

## 2018-02-20 DIAGNOSIS — R79.89 LOW VITAMIN D LEVEL: Primary | ICD-10-CM

## 2018-02-20 RX ORDER — FERROUS SULFATE 325(65) MG
325 TABLET ORAL 2 TIMES DAILY WITH MEALS
Qty: 60 TABLET | Refills: 5 | Status: SHIPPED | OUTPATIENT
Start: 2018-02-20 | End: 2019-01-28 | Stop reason: SDUPTHER

## 2018-02-20 RX ORDER — ERGOCALCIFEROL 1.25 MG/1
50000 CAPSULE ORAL WEEKLY
Qty: 4 CAPSULE | Refills: 0 | Status: SHIPPED | OUTPATIENT
Start: 2018-02-20 | End: 2019-01-28 | Stop reason: SDUPTHER

## 2018-02-20 RX ORDER — CHLORAL HYDRATE 500 MG
1000 CAPSULE ORAL 3 TIMES DAILY
Qty: 90 CAPSULE | Refills: 5 | Status: SHIPPED | OUTPATIENT
Start: 2018-02-20 | End: 2019-02-11

## 2018-02-20 RX ORDER — UREA 10 %
100 LOTION (ML) TOPICAL DAILY
Qty: 30 TABLET | Refills: 5 | Status: SHIPPED | OUTPATIENT
Start: 2018-02-20 | End: 2019-01-28

## 2018-02-26 ENCOUNTER — HOSPITAL ENCOUNTER (OUTPATIENT)
Dept: PULMONOLOGY | Facility: HOSPITAL | Age: 48
Discharge: HOME | End: 2018-02-26
Payer: COMMERCIAL

## 2018-02-26 DIAGNOSIS — R05.3 CHRONIC COUGH: ICD-10-CM

## 2018-02-26 PROCEDURE — 94010 BREATHING CAPACITY TEST: CPT

## 2018-02-26 PROCEDURE — 94010 BREATHING CAPACITY TEST: CPT | Performed by: INTERNAL MEDICINE

## 2018-02-26 RX ORDER — ALBUTEROL SULFATE 2.5 MG/3ML
2.5 SOLUTION RESPIRATORY (INHALATION) ONCE AS NEEDED
Status: DISCONTINUED | OUTPATIENT
Start: 2018-02-26 | End: 2018-03-02 | Stop reason: HOSPADM

## 2018-02-28 NOTE — PROGRESS NOTES
pft testing was normal   I recommend that she make sure she get regular exercise and it her sob should improve

## 2018-03-01 ENCOUNTER — EVALUATION (OUTPATIENT)
Dept: PHYSICAL THERAPY | Facility: CLINIC | Age: 48
End: 2018-03-01
Payer: COMMERCIAL

## 2018-03-01 DIAGNOSIS — M54.9 UPPER BACK PAIN: Primary | ICD-10-CM

## 2018-03-01 DIAGNOSIS — M54.50 CHRONIC BILATERAL LOW BACK PAIN WITHOUT SCIATICA: ICD-10-CM

## 2018-03-01 DIAGNOSIS — G89.29 CHRONIC BILATERAL LOW BACK PAIN WITHOUT SCIATICA: ICD-10-CM

## 2018-03-01 PROCEDURE — G8990 OTHER PT/OT CURRENT STATUS: HCPCS | Performed by: PHYSICAL THERAPIST

## 2018-03-01 PROCEDURE — 97162 PT EVAL MOD COMPLEX 30 MIN: CPT | Performed by: PHYSICAL THERAPIST

## 2018-03-01 PROCEDURE — G8991 OTHER PT/OT GOAL STATUS: HCPCS | Performed by: PHYSICAL THERAPIST

## 2018-03-01 NOTE — PROGRESS NOTES
PT Evaluation     Today's date: 3/1/2018  Patient name: Jessika Tsang  : 1970  MRN: [id number]  Referring provider: Razia Leroy PA-C  Dx:   Encounter Diagnosis     ICD-10-CM    1  Upper back pain M54 9 Ambulatory referral to Physical Therapy   2  Chronic bilateral low back pain without sciatica M54 5 Ambulatory referral to Physical Therapy    G89 29                   Assessment  Impairments: abnormal gait, abnormal or restricted ROM, impaired physical strength and pain with function    Assessment details: Jessika Tsang is a 52 y o  female who presents to physical therapy with thoracic and lumbar pain with radicular symptoms to L lower extremity  Pt has deficits in lumbar AROM and L lower extremity strength  Pt has poor stability of L sacroiliac joint  Pt has deficits in posture and ambulation in addition to core stability  Pt has difficulty with prolonged standing, sitting, and lifting clients for work  Jessika Tsang would benefit from formal physical therapy to address impairments as detailed, decrease pain, and restore maximal level of function for all home, work, and mobility tasks  Thank you for this referral     Understanding of Dx/Px/POC: good   Prognosis: good    Goals  Short-term goals:  1  Pt will decrease pain by 1-2 points within 4 weeks  2  Pt will improve ROM by 5-10 degrees within 4 weeks  3  Pt will improve strength by 1/2 grade within 4 weeks  4  Pt will improve physical FS score by 7 points by discharge  Long-term goals:  1  Pt will demonstrate independence with HEP by discharge  2  Pt will return to all lifting tasks with good body mechanics and pain <3/10 by discharge  3  Pt will walk >3 blocks with pain <3/10 by discharge      Plan  Patient would benefit from: PT eval and skilled PT  Planned modality interventions: cryotherapy, thermotherapy: hydrocollator packs and TENS  Planned therapy interventions: joint mobilization, manual therapy, neuromuscular re-education, patient education, strengthening, stretching, therapeutic exercise, flexibility, functional ROM exercises, home exercise program, abdominal trunk stabilization and body mechanics training  Frequency: 2x week  Duration in weeks: 4  Treatment plan discussed with: patient        Subjective Evaluation    History of Present Illness  Mechanism of injury: Pt reports feeling pain from neck to low back, radiating to L leg and stopping at the gastroc  Pt states that pain has been lasting for several years  Pt denies trauma, falls, precipitating incident  Pt reports numbness and tiredness in bilateral legs with walking greater than 3 blocks  Pt feels worst when she is relaxing and lying down  Pt also has pain with prolonged sitting  Pt has not had significant relief with medication  Pt has not had injections or bracing  Pt has not had recent imaging  Pt denies changes in bowel/bladder function  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  Pt states that the hospital beds have cranks that are difficult to adjust  Pt reports some relief with wearing belt (similar to LSO) with heat  Pain  At best pain ratin  At worst pain ratin  Location: central low back  Quality: sharp  Relieving factors: heat  Aggravating factors: lifting    Social Support  Stairs in house: yes   Lives with: adult children    Employment status: working (home care)    Diagnostic Tests  No diagnostic tests performed  Patient Goals  Patient goal: feel better and not have horrible pain        Objective     Special Questions  Negative for bladder dysfunction, bowel dysfunction and saddle (S4) numbness    Static Posture     Lumbar Spine   Flattened  Comments  Flexed spine    Palpation   Left   Tenderness of the erector spinae and lumbar paraspinals  Right Tenderness of the erector spinae and lumbar paraspinals  Tenderness     Left Hip   Tenderness in the PSIS  Right Hip   Tenderness in the PSIS       Neurological Testing     Sensation     Lumbar   Left   Intact: light touch    Right   Intact: light touch    Reflexes   Left   Patellar (L4): normal (2+)  Achilles (S1): normal (2+)  Clonus sign: negative    Right   Patellar (L4): trace (1+)  Achilles (S1): trace (1+)  Clonus sign: negative    Active Range of Motion   Cervical/Thoracic Spine   Normal active range of motion    Lumbar   Flexion: 80 degrees with pain  Extension: 30 degrees with pain  Left lateral flexion: 22 degrees with pain  Right lateral flexion: 21 degrees with pain  Left rotation: Active left lumbar rotation: 75% with pain  Right rotation: Active right lumbar rotation: 75% with pain    Strength/Myotome Testing     Left Hip   Planes of Motion   Flexion: 4  External rotation: 4+  Internal rotation: 4+    Right Hip   Planes of Motion   Flexion: 5  External rotation: 5  Internal rotation: 5    Left Knee   Flexion: 4 (hamstring pain)  Extension: 4+    Right Knee   Flexion: 5  Extension: 4+    Left Ankle/Foot   Dorsiflexion: 4+  Plantar flexion: 4  Inversion: 4  Eversion: 4+    Right Ankle/Foot   Dorsiflexion: 5  Plantar flexion: 5  Inversion: 5  Eversion: 5    Tests       Thoracic   Negative slump  Lumbar   Negative slump  Left   Positive passive SLR  Right   Negative crossed SLR and passive SLR       Additional Tests Details  + FADIR L  Poor transverse abdominis activation, more difficulty with L active SLR  Pt has L rotation of thoracic spine at T7-T10    Ambulation     Comments   Leaning R, L shoulder higher          Precautions: HTN    Daily Treatment Diary     Manual  3/1/18            HVLAT to L ribs in prone 2 min            HVLAT to CT junction in seated 3 min            Lumbar mobs PRN np                                          Exercise Diary  3/1/18            bike nv            Ab brace seated 5"x10            ER with scap retraction 5x            Ab brace + ball squeeze nv            Ab brace + TB hip abd nv            Ab brace + low SLR nv Ab brace + bridge nv            Side stepping nv            Ab brace + mini squats nv            Ab brace + TB shoulder ext nv            Ab brace + TB low rows nv                                                                                                                                     Modalities  3/1/18            MH nv            TENS nv

## 2018-03-07 ENCOUNTER — APPOINTMENT (OUTPATIENT)
Dept: PHYSICAL THERAPY | Facility: CLINIC | Age: 48
End: 2018-03-07
Payer: COMMERCIAL

## 2018-03-08 ENCOUNTER — APPOINTMENT (OUTPATIENT)
Dept: PHYSICAL THERAPY | Facility: CLINIC | Age: 48
End: 2018-03-08
Payer: COMMERCIAL

## 2018-03-12 ENCOUNTER — OFFICE VISIT (OUTPATIENT)
Dept: PHYSICAL THERAPY | Facility: CLINIC | Age: 48
End: 2018-03-12
Payer: COMMERCIAL

## 2018-03-12 DIAGNOSIS — M54.9 UPPER BACK PAIN: Primary | ICD-10-CM

## 2018-03-12 DIAGNOSIS — G89.29 CHRONIC BILATERAL LOW BACK PAIN WITHOUT SCIATICA: ICD-10-CM

## 2018-03-12 DIAGNOSIS — M54.50 CHRONIC BILATERAL LOW BACK PAIN WITHOUT SCIATICA: ICD-10-CM

## 2018-03-12 PROCEDURE — 97110 THERAPEUTIC EXERCISES: CPT | Performed by: PHYSICAL THERAPIST

## 2018-03-12 PROCEDURE — 97140 MANUAL THERAPY 1/> REGIONS: CPT | Performed by: PHYSICAL THERAPIST

## 2018-03-12 PROCEDURE — 97150 GROUP THERAPEUTIC PROCEDURES: CPT | Performed by: PHYSICAL THERAPIST

## 2018-03-12 PROCEDURE — 97112 NEUROMUSCULAR REEDUCATION: CPT | Performed by: PHYSICAL THERAPIST

## 2018-03-12 NOTE — PROGRESS NOTES
Daily Note     Today's date: 3/12/2018  Patient name: Mina Bryant  : 1970  MRN: [id number]  Referring provider: Susana Krause PA-C  Dx:   Encounter Diagnosis     ICD-10-CM    1  Upper back pain M54 9    2  Chronic bilateral low back pain without sciatica M54 5     G89 29                   Subjective: Pt rates back pain 7/10 at start of session  Pt states that she had increase in back pain following mobilizations at initial eval but felt better the next day  Objective: See treatment diary below      Precautions: HTN    Daily Treatment Diary     Manual  3/1/18 3/12/18           HVLAT to L ribs in prone 2 min 3 min           HVLAT to CT junction in seated 3 min np           Lumbar mobs B gapping and rotational np 8 min                                         Exercise Diary  3/1/18 3/12/18           bike nv 5 min           Ab brace seated 5"x10 3"x20           ER with scap retraction 5x Org 3"x15           Ab brace + ball squeeze nv 3"x15           Ab brace + TB hip abd nv Org 3"x15           Ab brace + low SLR nv 15x ea           Ab brace + bridge nv 3"x10           Side stepping nv 3 laps           Ab brace + mini squats nv 3"x15           Ab brace + TB shoulder ext nv Org 3"x15           Ab brace + TB low rows nv Org 3"x15                                                                                                                                    Modalities  3/1/18 3/12/18           MH nv declined           TENS nv declined                            Assessment: Tolerated treatment well  Patient demonstrated fatigue post treatment and would benefit from continued PT Pt required occasional cuing for posture and to avoid shrugging scapulae  Pt had good tolerance to initiation of full session  Pt had cavitation with thoracic spine mobilization  Pt reports feeling better and looser following lumbar mobilizations and rates pain reduced to 5/10 at end of session        Plan: Continue per plan of care   Progress treatment as tolerated

## 2018-03-14 ENCOUNTER — OFFICE VISIT (OUTPATIENT)
Dept: PHYSICAL THERAPY | Facility: CLINIC | Age: 48
End: 2018-03-14
Payer: COMMERCIAL

## 2018-03-14 DIAGNOSIS — G89.29 CHRONIC BILATERAL LOW BACK PAIN WITHOUT SCIATICA: ICD-10-CM

## 2018-03-14 DIAGNOSIS — M54.9 UPPER BACK PAIN: Primary | ICD-10-CM

## 2018-03-14 DIAGNOSIS — M54.50 CHRONIC BILATERAL LOW BACK PAIN WITHOUT SCIATICA: ICD-10-CM

## 2018-03-14 PROCEDURE — 97112 NEUROMUSCULAR REEDUCATION: CPT

## 2018-03-14 PROCEDURE — 97110 THERAPEUTIC EXERCISES: CPT

## 2018-03-14 NOTE — PROGRESS NOTES
Daily Note     Today's date: 3/14/2018  Patient name: Elif Portillo  : 1970  MRN: [id number]  Referring provider: Burt Uribe PA-C  Dx:   Encounter Diagnosis     ICD-10-CM    1  Upper back pain M54 9    2  Chronic bilateral low back pain without sciatica M54 5     G89 29                   Subjective: Pt reports decreased back pain grading it a 5/10 at start of session  Pt reports decreased pain before performing bridges which she states increased pain on R low back  Pt declined MHP  Objective: See treatment diary below      Precautions: HTN    Daily Treatment Diary     Manual  3/1/18 3/12/18 3/14/18          HVLAT to L ribs in prone 2 min 3 min np          HVLAT to CT junction in seated 3 min np np          Lumbar mobs B gapping and rotational np 8 min np                                        Exercise Diary  3/1/18 3/12/18 3/14/18          bike nv 5 min 5 mins          Ab brace seated 5"x10 3"x20 3"x20          ER with scap retraction 5x Org 3"x15 Org 3"x15          Ab brace + ball squeeze nv 3"x15 3"x15          Ab brace + TB hip abd nv Org 3"x15 Org 3"x20          Ab brace + low SLR nv 15x ea 15x ea          Ab brace + bridge nv 3"x10 3" x 10          Side stepping nv 3 laps 3 laps          Ab brace + mini squats nv 3"x15 3" x 15          Ab brace + TB shoulder ext nv Org 3"x15 Org 3"  2x10          Ab brace + TB low rows nv Org 3"x15 Org 3"  2x10                                                                                                                                   Modalities  3/1/18 3/12/18 3/14/18          MH nv declined declined          TENS nv declined declined                           Assessment: Tolerated progressions well with no complaints of increased pain until performing the bridges: omit NV  Pt demonstrates improvement with posture while performing Te  Manual therapy not performed secondary to PT in new evaluation  Resume NV  Plan: Continue per plan of care  Progress treatment as tolerated

## 2018-03-19 ENCOUNTER — OFFICE VISIT (OUTPATIENT)
Dept: PHYSICAL THERAPY | Facility: CLINIC | Age: 48
End: 2018-03-19
Payer: COMMERCIAL

## 2018-03-19 DIAGNOSIS — M54.9 UPPER BACK PAIN: Primary | ICD-10-CM

## 2018-03-19 DIAGNOSIS — G89.29 CHRONIC BILATERAL LOW BACK PAIN WITHOUT SCIATICA: ICD-10-CM

## 2018-03-19 DIAGNOSIS — M54.50 CHRONIC BILATERAL LOW BACK PAIN WITHOUT SCIATICA: ICD-10-CM

## 2018-03-19 PROCEDURE — 97110 THERAPEUTIC EXERCISES: CPT

## 2018-03-19 PROCEDURE — 97530 THERAPEUTIC ACTIVITIES: CPT

## 2018-03-19 PROCEDURE — 97112 NEUROMUSCULAR REEDUCATION: CPT

## 2018-03-19 PROCEDURE — 97140 MANUAL THERAPY 1/> REGIONS: CPT

## 2018-03-19 NOTE — PROGRESS NOTES
Daily Note     Today's date: 3/19/2018  Patient name: Cecilia Quiros  : 1970  MRN: [id number]  Referring provider: Barak Hudson PA-C  Dx:   Encounter Diagnosis     ICD-10-CM    1  Upper back pain M54 9    2  Chronic bilateral low back pain without sciatica M54 5     G89 29                   Subjective: Pt reports no change in back pain grading it a 5/10 at start of session  Pt reports decreased pain grading the pain a 2/10 and increased mobility post PT session  Pt declined MHP        Objective: See treatment diary below      Precautions: HTN    Daily Treatment Diary     Manual  3/1/18 3/12/18 3/14/18 3/19/18         HVLAT to L ribs in prone 2 min 3 min np See below         HVLAT to CT junction in seated 3 min np np All performed by DPT MARGARET 10 mins         Lumbar mobs B gapping and rotational np 8 min np See above                                       Exercise Diary  3/1/18 3/12/18 3/14/18 3/19/18         bike nv 5 min 5 mins 5 mins         Ab brace seated 5"x10 3"x20 3"x20 3"x20         ER with scap retraction 5x Org 3"x15 Org 3"x15 Org 3"x15         Ab brace + ball squeeze nv 3"x15 3"x15 3"x20         Ab brace + TB hip abd nv Org 3"x15 Org 3"x20 Org 3"x20         Ab brace + low SLR nv 15x ea 15x ea 15x ea         Ab brace + bridge nv 3"x10 3" x 10 Hold pn last visit         Side stepping nv 3 laps 3 laps 3 laps         Ab brace + mini squats nv 3"x15 3" x 15 3" x 15         Ab brace + TB shoulder ext nv Org 3"x15 Org 3"  2x10 Org 3"  2x10         Ab brace + TB low rows nv Org 3"x15 Org 3"  2x10 Org 3"  2x10                                                                                                                                  Modalities  3/1/18 3/12/18 3/14/18 3/19/18         MH nv declined declined declined         TENS nv declined declined declined                          Assessment: Tolerated progressions well with no complaints of increased pain however bridges were held secondary to pain last session  She required occ cuing for correct technique  Plan: Continue per plan of care  Progress treatment as tolerated

## 2018-03-26 ENCOUNTER — OFFICE VISIT (OUTPATIENT)
Dept: PHYSICAL THERAPY | Facility: CLINIC | Age: 48
End: 2018-03-26
Payer: COMMERCIAL

## 2018-03-26 DIAGNOSIS — M54.9 UPPER BACK PAIN: Primary | ICD-10-CM

## 2018-03-26 DIAGNOSIS — M54.50 CHRONIC BILATERAL LOW BACK PAIN WITHOUT SCIATICA: ICD-10-CM

## 2018-03-26 DIAGNOSIS — G89.29 CHRONIC BILATERAL LOW BACK PAIN WITHOUT SCIATICA: ICD-10-CM

## 2018-03-26 PROCEDURE — 97140 MANUAL THERAPY 1/> REGIONS: CPT

## 2018-03-26 PROCEDURE — 97110 THERAPEUTIC EXERCISES: CPT

## 2018-03-26 NOTE — PROGRESS NOTES
Daily Note     Today's date: 3/26/2018  Patient name: Rupali Angel  : 1970  MRN: [id number]  Referring provider: Malu Gaytan PA-C  Dx:   Encounter Diagnosis     ICD-10-CM    1  Upper back pain M54 9    2  Chronic bilateral low back pain without sciatica M54 5     G89 29                   Subjective: Pt reports with c/o mild pain across LB graded 3/10 at present time  Objective: See treatment diary below  Precautions: HTN    Daily Treatment Diary     Manual  3/1/18 3/12/18 3/14/18 3/19/18 3/26        HVLAT to L ribs in prone 2 min 3 min np See below np        HVLAT to CT junction in seated 3 min np np All performed by DPT MARGARET 10 mins NC DPT        Lumbar mobs B gapping and rotational np 8 min np See above NC DPT                                      Exercise Diary  3/1/18 3/12/18 3/14/18 3/19/18 3/26        bike nv 5 min 5 mins 5 mins 5 min        Ab brace seated 5"x10 3"x20 3"x20 3"x20 3"x20        ER with scap retraction 5x Org 3"x15 Org 3"x15 Org 3"x15         Ab brace + ball squeeze nv 3"x15 3"x15 3"x20 5"x20        Ab brace + TB hip abd nv Org 3"x15 Org 3"x20 Org 3"x20 Org  3  5"x20        Ab brace + low SLR nv 15x ea 15x ea 15x ea 20x ea        Ab brace + bridge nv 3"x10 3" x 10 Hold pn last visit         Side stepping nv 3 laps 3 laps 3 laps 3 laps        Ab brace + mini squats nv 3"x15 3" x 15 3" x 15 3"x15        Ab brace + TB shoulder ext nv Org 3"x15 Org 3"  2x10 Org 3"  2x10 Org 3"  2x10        Ab brace + TB low rows nv Org 3"x15 Org 3"  2x10 Org 3"  2x10 Org 3"2x10                                                                                                                                 Modalities  3/1/18 3/12/18 3/14/18 3/19/18 3/26        MH nv declined declined declined declined        TENS nv declined declined declined declined                         Assessment: Tolerated treatment well   Patient exhibited good technique with therapeutic exercises and denied c/o pain while doing so  Positive relief with manuals in which she reported pain abolished post treatment  Plan: Progress treatment as tolerated

## 2018-03-28 ENCOUNTER — OFFICE VISIT (OUTPATIENT)
Dept: PHYSICAL THERAPY | Facility: CLINIC | Age: 48
End: 2018-03-28
Payer: COMMERCIAL

## 2018-03-28 DIAGNOSIS — M54.9 UPPER BACK PAIN: Primary | ICD-10-CM

## 2018-03-28 DIAGNOSIS — G89.29 CHRONIC BILATERAL LOW BACK PAIN WITHOUT SCIATICA: ICD-10-CM

## 2018-03-28 DIAGNOSIS — M54.50 CHRONIC BILATERAL LOW BACK PAIN WITHOUT SCIATICA: ICD-10-CM

## 2018-03-28 PROCEDURE — 97110 THERAPEUTIC EXERCISES: CPT

## 2018-03-28 PROCEDURE — 97530 THERAPEUTIC ACTIVITIES: CPT

## 2018-03-28 PROCEDURE — 97112 NEUROMUSCULAR REEDUCATION: CPT

## 2018-03-28 NOTE — PROGRESS NOTES
Daily Note     Today's date: 3/28/2018  Patient name: Rick High  : 1970  MRN: [id number]  Referring provider: Joey Vivar PA-C  Dx:   Encounter Diagnosis     ICD-10-CM    1  Upper back pain M54 9    2  Chronic bilateral low back pain without sciatica M54 5     G89 29                   Subjective:  Pt presents to PT denying pain in low back  Pt reports muscle soreness in B TS grading it a 2/10  Pt denies low back pain post TE session        Objective: See treatment diary below    Precautions: HTN    Daily Treatment Diary     Manual  3/1/18 3/12/18 3/14/18 3/19/18 3/26 3/28/18       HVLAT to L ribs in prone 2 min 3 min np See below np np       HVLAT to CT junction in seated 3 min np np All performed by DPT MARGARET 10 mins NC DPT np       Lumbar mobs B gapping and rotational np 8 min np See above NC DPT np                                     Exercise Diary  3/1/18 3/12/18 3/14/18 3/19/18 3/26 3/28/18       bike nv 5 min 5 mins 5 mins 5 min 5 min       Ab brace seated 5"x10 3"x20 3"x20 3"x20 3"x20 On grn pball 3" x 15       ER with scap retraction 5x Org 3"x15 Org 3"x15 Org 3"x15  On grn pball 3" x 15  Org TB       Ab brace + ball squeeze nv 3"x15 3"x15 3"x20 5"x20 On grn pball 3" x 15       Ab brace + TB hip abd nv Org 3"x15 Org 3"x20 Org 3"x20 Org  3  5"x20 On grn pball 3" x 15  Org TB       Ab brace + low SLR nv 15x ea 15x ea 15x ea 20x ea 20x ea       Ab brace + bridge nv 3"x10 3" x 10 Hold pn last visit  held       Side stepping nv 3 laps 3 laps 3 laps 3 laps Org 2 laps       Ab brace + mini squats nv 3"x15 3" x 15 3" x 15 3"x15 3"x15       Ab brace + TB shoulder ext nv Org 3"x15 Org 3"  2x10 Org 3"  2x10 Org 3"  2x10 grn 3"  x15       Ab brace + TB low rows nv Org 3"x15 Org 3"  2x10 Org 3"  2x10 Org 3"2x10 grn 3"  x15       AB brace supine with alt marching      10x ea       Bridge laying on pball to neutral      X 2 pain  hold hold Modalities  3/1/18 3/12/18 3/14/18 3/19/18 3/26 3/28/18       MH nv declined declined declined declined declined       TENS nv declined declined declined declined declined                        Assessment: Pt demonstrates good tolerance to progressions however she c/o soreness which may have been from seated pball TE and using postural muscles  Pt demonstrates good form and posture with all TE on pball  Attempted pball bridge however pt reports pain in low back; hold  Added supine march with AB bracing with good form with occ cues  Assess NV and progress as able  Plan: Progress treatment as tolerated

## 2018-04-02 ENCOUNTER — OFFICE VISIT (OUTPATIENT)
Dept: PHYSICAL THERAPY | Facility: CLINIC | Age: 48
End: 2018-04-02
Payer: COMMERCIAL

## 2018-04-02 DIAGNOSIS — M54.50 CHRONIC BILATERAL LOW BACK PAIN WITHOUT SCIATICA: ICD-10-CM

## 2018-04-02 DIAGNOSIS — M54.9 UPPER BACK PAIN: Primary | ICD-10-CM

## 2018-04-02 DIAGNOSIS — G89.29 CHRONIC BILATERAL LOW BACK PAIN WITHOUT SCIATICA: ICD-10-CM

## 2018-04-02 PROCEDURE — 97110 THERAPEUTIC EXERCISES: CPT

## 2018-04-02 PROCEDURE — 97140 MANUAL THERAPY 1/> REGIONS: CPT

## 2018-04-02 NOTE — PROGRESS NOTES
Daily Note     Today's date: 2018  Patient name: Kati Acosta  : 1970  MRN: [id number]  Referring provider: David Bautista PA-C  Dx:   Encounter Diagnosis     ICD-10-CM    1  Upper back pain M54 9    2  Chronic bilateral low back pain without sciatica M54 5     G89 29                   Subjective:  Pt reports with c/o moderate lower back pain graded 5/10  She denied increased pain/soreness after last visit        Objective: See treatment diary below    Precautions: HTN    Daily Treatment Diary     Manual  3/1/18 3/12/18 3/14/18 3/19/18 3/26 3/28/18 4/2/18      HVLAT to L ribs in prone 2 min 3 min np See below np np       HVLAT to CT junction in seated 3 min np np All performed by DPT MARGARET 10 mins NC DPT np AS DPT      Lumbar mobs B gapping and rotational np 8 min np See above NC DPT np AS DPT                                    Exercise Diary  3/1/18 3/12/18 3/14/18 3/19/18 3/26 3/28/18 4/2      bike nv 5 min 5 mins 5 mins 5 min 5 min 5 min      Ab brace seated 5"x10 3"x20 3"x20 3"x20 3"x20 On grn pball 3" x 15 On grn pball 3"x15      ER with scap retraction 5x Org 3"x15 Org 3"x15 Org 3"x15  On grn pball 3" x 15  Org TB On grn pball  x15 orgTB      Ab brace + ball squeeze nv 3"x15 3"x15 3"x20 5"x20 On grn pball 3" x 15 On grn pball 3"x15      Ab brace + TB hip abd nv Org 3"x15 Org 3"x20 Org 3"x20 Org  3  5"x20 On grn pball 3" x 15  Org TB On grn pball 3"x15 org tb      Ab brace + low SLR nv 15x ea 15x ea 15x ea 20x ea 20x ea 20x ea      Ab brace + bridge nv 3"x10 3" x 10 Hold pn last visit  held       Side stepping nv 3 laps 3 laps 3 laps 3 laps Org 2 laps Org 2 laps      Ab brace + mini squats nv 3"x15 3" x 15 3" x 15 3"x15 3"x15 3"x15      Ab brace + TB shoulder ext nv Org 3"x15 Org 3"  2x10 Org 3"  2x10 Org 3"  2x10 grn 3"  x15 grn  3"x15      Ab brace + TB low rows nv Org 3"x15 Org 3"  2x10 Org 3"  2x10 Org 3"2x10 grn 3"  x15 grn  3"x15      AB brace supine with alt marching      10x ea       Bridge laying on pball to neutral      X 2 pain  hold hold                                                                                                     Modalities  3/1/18 3/12/18 3/14/18 3/19/18 3/26 3/28/18       MH nv declined declined declined declined declined       TENS nv declined declined declined declined declined                        Assessment: Pt demonstrates good tolerance to current program   Pt demonstrates good form and posture with all TE on pball  Continues to respond well with manuals  Plan: Progress treatment as tolerated

## 2018-04-04 ENCOUNTER — EVALUATION (OUTPATIENT)
Dept: PHYSICAL THERAPY | Facility: CLINIC | Age: 48
End: 2018-04-04
Payer: COMMERCIAL

## 2018-04-04 DIAGNOSIS — M54.50 CHRONIC BILATERAL LOW BACK PAIN WITHOUT SCIATICA: ICD-10-CM

## 2018-04-04 DIAGNOSIS — M54.9 UPPER BACK PAIN: Primary | ICD-10-CM

## 2018-04-04 DIAGNOSIS — G89.29 CHRONIC BILATERAL LOW BACK PAIN WITHOUT SCIATICA: ICD-10-CM

## 2018-04-04 PROCEDURE — G8990 OTHER PT/OT CURRENT STATUS: HCPCS | Performed by: PHYSICAL THERAPIST

## 2018-04-04 PROCEDURE — G8991 OTHER PT/OT GOAL STATUS: HCPCS | Performed by: PHYSICAL THERAPIST

## 2018-04-04 PROCEDURE — 97140 MANUAL THERAPY 1/> REGIONS: CPT | Performed by: PHYSICAL THERAPIST

## 2018-04-04 NOTE — PROGRESS NOTES
PT Re-Evaluation     Today's date: 2018  Patient name: Luis Boyce  : 1970  MRN: [id number]  Referring provider: Kaylynn Olivera PA-C  Dx:   Encounter Diagnosis     ICD-10-CM    1  Upper back pain M54 9    2  Chronic bilateral low back pain without sciatica M54 5     G89 29                   Assessment  Impairments: abnormal gait, abnormal or restricted ROM, impaired physical strength and pain with function    Assessment details: Luis Boyce is a 52 y o  female who presents to physical therapy with thoracic and lumbar pain  Pt continues to have pain in upper thoracic spine, improved with manual therapy  Pt is able to sit for about 40 minutes prior to increase in pain  Pt states that she does not stand for long periods of time  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 and had increase in back pain afterward secondary to lifting  Pt denies difficulty with stairs  Pt is able to walk several blocks prior to increase in pain  Pt feels that therapy has been helpful  Pt has been compliant with therapy attendance and moderately compliant with HEP  Pt demonstrates some improvements in lumbar AROM but continues to have pain with bilateral hip FADIR and global hypomobility throughout lumbar and thoracic spine  Pt has difficulty with L SI stability and walks with R lean  Luis Boyce would benefit from formal physical therapy to address impairments as detailed, decrease pain, and restore maximal level of function for all home, work, and mobility tasks  Thank you for this referral     Understanding of Dx/Px/POC: good   Prognosis: good    Goals  Short-term goals:  1  Pt will decrease pain by 1-2 points within 4 weeks  - partially met  2  Pt will improve ROM by 5-10 degrees within 4 weeks  - partially met  3  Pt will improve strength by 1/2 grade within 4 weeks  - met  4  Pt will improve physical FS score by 7 points by discharge  - met  Long-term goals:  1   Pt will demonstrate independence with HEP by discharge  - partially met  2  Pt will return to all lifting tasks with good body mechanics and pain <3/10 by discharge  - partially met  3  Pt will walk >3 blocks with pain <3/10 by discharge  - partially met    Plan  Patient would benefit from: skilled PT  Planned modality interventions: cryotherapy, thermotherapy: hydrocollator packs and TENS  Planned therapy interventions: joint mobilization, manual therapy, neuromuscular re-education, patient education, strengthening, stretching, therapeutic exercise, flexibility, functional ROM exercises, home exercise program, abdominal trunk stabilization, body mechanics training and postural training  Frequency: 2x week  Duration in weeks: 4  Treatment plan discussed with: patient  Plan details: 2-4 more weeks at 2x/week        Subjective Evaluation    History of Present Illness  Mechanism of injury: Pt still has low back pain but reports that radicular symptoms to L leg have resolved  Pt states that she no longer feels numbness and tiredness with prolonged walking  Pt continues to feel worst when she is relaxing, lying down, or sitting for a prolonged time  Pt has not had significant relief with medication and is taking about the same amount as before  Pt has not had injections or bracing  Pt denies changes in bowel/bladder function  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  Pt continues to report some relief with wearing belt (similar to LSO) with heat  Pt does not have follow up with MD at this time    Pain  Current pain ratin  At best pain ratin  At worst pain ratin  Location: central low back  Quality: sharp  Relieving factors: heat  Aggravating factors: lifting    Social Support  Stairs in house: yes   Lives with: adult children    Employment status: working (home care)    Diagnostic Tests  No diagnostic tests performed  Patient Goals  Patient goal: feel better and not have horrible pain        Objective     Special Questions  Negative for bladder dysfunction, bowel dysfunction and saddle (S4) numbness    Static Posture     Comments  Flexed spine; upright posture and normal curves at RE    Palpation   Left   Tenderness of the erector spinae and lumbar paraspinals  Right Tenderness of the erector spinae and lumbar paraspinals  Tenderness     Left Hip   Tenderness in the PSIS  Right Hip   Tenderness in the PSIS  Additional Tenderness Details  Pt demonstrates improve SI stability bilaterally    Neurological Testing     Sensation     Lumbar   Left   Intact: light touch    Right   Intact: light touch    Reflexes   Left   Patellar (L4): normal (2+)  Achilles (S1): normal (2+)  Clonus sign: negative    Right   Patellar (L4): trace (1+)  Achilles (S1): trace (1+)  Clonus sign: negative    Active Range of Motion   Cervical/Thoracic Spine   Normal active range of motion    Lumbar   Flexion: 94 degrees with pain  Extension: 24 degrees with pain  Left lateral flexion: 22 degrees with pain  Right lateral flexion: 19 degrees with pain  Left rotation: Active left lumbar rotation: 90% with pain  Right rotation: Active right lumbar rotation: 90% with pain    Additional Active Range of Motion Details  Pain worst with flexion, R Sb, and R rotation    Strength/Myotome Testing     Left Hip   Planes of Motion   Flexion: 4+  External rotation: 4+  Internal rotation: 5    Right Hip   Planes of Motion   Flexion: 5  External rotation: 4+  Internal rotation: 5    Left Knee   Flexion: 4+  Extension: 4+    Right Knee   Flexion: 5  Extension: 5    Left Ankle/Foot   Dorsiflexion: 5  Plantar flexion: 5  Inversion: 5  Eversion: 5    Right Ankle/Foot   Dorsiflexion: 5  Plantar flexion: 5  Inversion: 5  Eversion: 5    Tests       Thoracic   Negative slump  Lumbar   Negative slump  Left   Negative passive SLR  Right   Negative crossed SLR and passive SLR       Additional Tests Details  + FADIR L; persists at RE  + R FADIR at RE  Poor transverse abdominis activation, more difficulty with L active SLR; at RE pt has improved TA activation but continues to have difficulty with L active SLR  Pt has L rotation of thoracic spine at T7-T10; persists at RE    Ambulation     Comments   Leaning R, L shoulder higher; persists at Re, improved gait speed      Flowsheet Rows    Flowsheet Row Most Recent Value   PT/OT G-Codes   Current Score  71   Projected Score  67   FOTO information reviewed  Yes   Assessment Type  Re-evaluation   G code set  Other PT/OT Primary   Other PT Primary Current Status ()  CJ   Other PT Primary Goal Status ()  CJ        Precautions: HTN    Daily Treatment Diary     Manual  3/1/18 3/12/18 3/14/18 3/19/18 3/26 3/28/18 4/2/18 4/4/18     HVLAT to L ribs in prone 2 min 3 min np See below np np  2 min     HVLAT to CT junction in seated 3 min np np All performed by DPT MARGARET 10 mins NC DPT np AS DPT 2 min     Lumbar mobs B gapping and rotational np 8 min np See above NC DPT np AS DPT 5 min     measurements        20 min                      Exercise Diary  3/1/18 3/12/18 3/14/18 3/19/18 3/26 3/28/18 4/2 4/4/18     bike nv 5 min 5 mins 5 mins 5 min 5 min 5 min 5 min     Ab brace seated 5"x10 3"x20 3"x20 3"x20 3"x20 On grn pball 3" x 15 On grn pball 3"x15 nv     ER with scap retraction 5x Org 3"x15 Org 3"x15 Org 3"x15  On grn pball 3" x 15  Org TB On grn pball  x15 orgTB nv     Ab brace + ball squeeze nv 3"x15 3"x15 3"x20 5"x20 On grn pball 3" x 15 On grn pball 3"x15 nv     Ab brace + TB hip abd nv Org 3"x15 Org 3"x20 Org 3"x20 Org  3  5"x20 On grn pball 3" x 15  Org TB On grn pball 3"x15 org tb nv     Ab brace + low SLR nv 15x ea 15x ea 15x ea 20x ea 20x ea 20x ea nv     Ab brace + bridge nv 3"x10 3" x 10 Hold pn last visit  held  hold     Side stepping nv 3 laps 3 laps 3 laps 3 laps Org 2 laps Org 2 laps nv     Ab brace + mini squats nv 3"x15 3" x 15 3" x 15 3"x15 3"x15 3"x15 nv     Ab brace + TB shoulder ext nv Org 3"x15 Org 3"  2x10 Org 3"  2x10 Org 3"  2x10 grn 3"  x15 grn  3"x15 nv     Ab brace + TB low rows nv Org 3"x15 Org 3"  2x10 Org 3"  2x10 Org 3"2x10 grn 3"  x15 grn  3"x15 nv     AB brace supine with alt marching      10x ea  nv     Bridge laying on pball to neutral      X 2 pain  hold hold hold     Tspine series on 1/2 roll        nv     UE wall slide with lift off        nv     Lumbar flexion 3 way with pball        nv                                                             Modalities  3/1/18 3/12/18 3/14/18 3/19/18 3/26 3/28/18 4/4/18      MH nv declined declined declined declined declined 10 min pre      TENS nv declined declined declined declined declined np

## 2018-04-09 ENCOUNTER — APPOINTMENT (OUTPATIENT)
Dept: PHYSICAL THERAPY | Facility: CLINIC | Age: 48
End: 2018-04-09
Payer: COMMERCIAL

## 2018-04-18 ENCOUNTER — APPOINTMENT (OUTPATIENT)
Dept: PHYSICAL THERAPY | Facility: CLINIC | Age: 48
End: 2018-04-18
Payer: COMMERCIAL

## 2018-04-23 ENCOUNTER — APPOINTMENT (OUTPATIENT)
Dept: PHYSICAL THERAPY | Facility: CLINIC | Age: 48
End: 2018-04-23
Payer: COMMERCIAL

## 2018-04-25 ENCOUNTER — APPOINTMENT (OUTPATIENT)
Dept: PHYSICAL THERAPY | Facility: CLINIC | Age: 48
End: 2018-04-25
Payer: COMMERCIAL

## 2018-04-30 ENCOUNTER — APPOINTMENT (OUTPATIENT)
Dept: PHYSICAL THERAPY | Facility: CLINIC | Age: 48
End: 2018-04-30
Payer: COMMERCIAL

## 2018-05-01 LAB — HCV AB SER-ACNC: NEGATIVE

## 2018-08-06 ENCOUNTER — OFFICE VISIT (OUTPATIENT)
Dept: URGENT CARE | Facility: MEDICAL CENTER | Age: 48
End: 2018-08-06
Payer: COMMERCIAL

## 2018-08-06 VITALS
DIASTOLIC BLOOD PRESSURE: 69 MMHG | HEIGHT: 62 IN | OXYGEN SATURATION: 99 % | TEMPERATURE: 97.5 F | RESPIRATION RATE: 18 BRPM | BODY MASS INDEX: 31.65 KG/M2 | WEIGHT: 172 LBS | HEART RATE: 83 BPM | SYSTOLIC BLOOD PRESSURE: 127 MMHG

## 2018-08-06 DIAGNOSIS — M54.41 CHRONIC BILATERAL LOW BACK PAIN WITH RIGHT-SIDED SCIATICA: Primary | ICD-10-CM

## 2018-08-06 DIAGNOSIS — G89.29 CHRONIC BILATERAL LOW BACK PAIN WITH RIGHT-SIDED SCIATICA: Primary | ICD-10-CM

## 2018-08-06 PROCEDURE — 99214 OFFICE O/P EST MOD 30 MIN: CPT | Performed by: NURSE PRACTITIONER

## 2018-08-06 PROCEDURE — 96372 THER/PROPH/DIAG INJ SC/IM: CPT | Performed by: NURSE PRACTITIONER

## 2018-08-06 RX ORDER — CYCLOBENZAPRINE HCL 5 MG
5 TABLET ORAL
Qty: 30 TABLET | Refills: 0 | Status: SHIPPED | OUTPATIENT
Start: 2018-08-06 | End: 2019-01-23 | Stop reason: SDUPTHER

## 2018-08-06 RX ORDER — KETOROLAC TROMETHAMINE 30 MG/ML
60 INJECTION, SOLUTION INTRAMUSCULAR; INTRAVENOUS ONCE
Status: COMPLETED | OUTPATIENT
Start: 2018-08-06 | End: 2018-08-06

## 2018-08-06 RX ORDER — PREDNISONE 10 MG/1
TABLET ORAL
Qty: 21 TABLET | Refills: 0 | Status: SHIPPED | OUTPATIENT
Start: 2018-08-06 | End: 2018-09-11

## 2018-08-06 RX ADMIN — KETOROLAC TROMETHAMINE 60 MG: 30 INJECTION, SOLUTION INTRAMUSCULAR; INTRAVENOUS at 12:20

## 2018-08-06 NOTE — PROGRESS NOTES
[facility]        NAME: Chaitanya Ramos is a 52 y o  female  : 1970    MRN: [id number]  DATE: 2018  TIME: 12:17 PM    Assessment and Plan   Chronic bilateral low back pain with right-sided sciatica [M54 41, G89 29]  1  Chronic bilateral low back pain with right-sided sciatica  predniSONE 10 mg tablet    ketorolac (TORADOL) injection 60 mg    cyclobenzaprine (FLEXERIL) 5 mg tablet         Patient Instructions   Toradol 60 mg IM given in office, tolerated well  Gentle stretches, gentle massage  Pred taper and muscle relaxants as needed  Apply heat locally  Elevate extremities when at rest    Advance activity as tolerated  Wear supportive shoes  Follow up with PCP in 3-5 days  Proceed to  ER if symptoms worsen  Chief Complaint     Chief Complaint   Patient presents with    Back Pain     Patient here with lower back pain for the past 3 days  She reports the pain goes down her right leg  She has been going to PT for this pain  (Reports upper back pain also  )         History of Present Illness       Patient presents with back pain x 3 days  Noted to be in lower back, down into R leg and back of L knee  No specific injury/trauma/fall  Had been attending PT by PCP for past 3 months, though not helping  Not seen by Ortho, no MRI  + numbness/tingling down L leg  Reports increased pain with walking  Has been taking tylenol with no improvement  Increased pain with sitting for long periods of time  Denies saddle paresthesias  Review of Systems   Review of Systems   Constitutional: Negative for chills and fever  Respiratory: Negative  Cardiovascular: Negative  Gastrointestinal: Negative  Musculoskeletal: Positive for back pain  Negative for myalgias  Skin: Negative for rash           Current Medications       Current Outpatient Prescriptions:     baclofen 10 mg tablet, Take 1 tablet (10 mg total) by mouth daily at bedtime, Disp: 30 tablet, Rfl: 1   cyclobenzaprine (FLEXERIL) 5 mg tablet, Take 1 tablet (5 mg total) by mouth daily at bedtime, Disp: 30 tablet, Rfl: 0    ergocalciferol (VITAMIN D2) 50,000 units, Take 1 capsule (50,000 Units total) by mouth once a week, Disp: 4 capsule, Rfl: 0    ferrous sulfate 325 (65 Fe) mg tablet, Take 1 tablet (325 mg total) by mouth 2 (two) times a day with meals, Disp: 60 tablet, Rfl: 5    Omega-3 Fatty Acids (FISH OIL) 1,000 mg, Take 1 capsule (1,000 mg total) by mouth 3 (three) times a day, Disp: 90 capsule, Rfl: 5    omeprazole (PriLOSEC) 20 mg delayed release capsule, Take 1 capsule (20 mg total) by mouth daily, Disp: 30 capsule, Rfl: 1    predniSONE 10 mg tablet, Take 6 tabs on day 1, take 5 tabs on day 2, take 4 tabs on day 3, take 3 tabs on day 4, take 2 tabs on day 5, and take 1 tab on day 6 , Disp: 21 tablet, Rfl: 0    vitamin B-12 (CYANOCOBALAMIN) 100 MCG tablet, Take 1 tablet (100 mcg total) by mouth daily, Disp: 30 tablet, Rfl: 5    Current Facility-Administered Medications:     ketorolac (TORADOL) injection 60 mg, 60 mg, Intramuscular, Once, [employer], [facility]    Current Allergies     Allergies as of 2018    (No Known Allergies)            The following portions of the patient's history were reviewed and updated as appropriate: allergies, current medications, past family history, past medical history, past social history, past surgical history and problem list      Past Medical History:   Diagnosis Date    Diabetes ([facility] [address] )        Past Surgical History:   Procedure Laterality Date     SECTION      TUBAL LIGATION         Family History   Problem Relation Age of Onset    Diabetes Mother     Cancer Mother     Stroke Mother     Hypertension Mother     Lung cancer Mother     Hyperlipidemia Family          Medications have been verified          Objective   /69   Pulse 83   Temp 97 5 °F (36 4 °C)   Resp 18   Ht 5' 1 5" (1 562 m)   Wt 78 kg (172 lb)   LMP 2018 (Approximate)   SpO2 99%   BMI 31 97 kg/m²        Physical Exam     Physical Exam   Constitutional: She is oriented to person, place, and time  She appears well-developed and well-nourished  No distress  HENT:   Head: Normocephalic and atraumatic  Eyes: Pupils are equal, round, and reactive to light  Cardiovascular: Normal rate, regular rhythm, normal heart sounds and intact distal pulses  Pulmonary/Chest: Effort normal and breath sounds normal  No respiratory distress  She has no wheezes  Musculoskeletal:        Cervical back: Normal         Thoracic back: Normal         Lumbar back: She exhibits decreased range of motion, tenderness and pain  She exhibits no swelling  Generalized lumbar ttp with paraspinal muscles involved  Neurological: She is alert and oriented to person, place, and time  Skin: Skin is warm and dry  No rash noted  She is not diaphoretic  Psychiatric: She has a normal mood and affect  Nursing note and vitals reviewed

## 2018-08-06 NOTE — LETTER
August 6, 2018     Patient: Chris Graham   YOB: 1970   Date of Visit: 8/6/2018       To Whom it May Concern:    Chris Graham was seen in my clinic on 8/6/2018  She may return to work on Wednesday, August 8, 2018  If you have any questions or concerns, please don't hesitate to call           Sincerely,          KALIA Patino        CC: No Recipients

## 2018-08-06 NOTE — PATIENT INSTRUCTIONS
Gentle stretches, gentle massage  Pred taper and muscle relaxants as needed  Apply heat locally  Elevate extremities when at rest    Advance activity as tolerated  Wear supportive shoes  Follow up with PCP as discussed or go to nearest ED if any signs of distress  Back Pain   AMBULATORY CARE:   Back pain  is common  You may feel sore or stiff on one or both sides of your back  The pain may spread to your buttocks or thighs  Back pain may be caused by an injury, lack of exercise, or obesity  Repeated bending, lifting, twisting, or lifting heavy items can also cause back pain  Seek immediate care for the following symptoms:   · Pain, numbness, or weakness in one or both legs    · Pain that is so severe, you cannot walk    · Unable to control your urine or bowel movements    · Severe back pain with chest pain    · Severe back pain, nausea, and vomiting    · Severe back pain that spreads to your side or genital area  Contact your healthcare provider if:   · You have back pain that does not get better with rest and pain medicine  · You have a fever  · You have pain that worsens when you are on your back or when you rest     · You have pain that worsens when you cough or sneeze  · You lose weight without trying  · You have questions or concerns about your condition or care  Treatment for back pain  may include any of the following:  · NSAIDs , such as ibuprofen, help decrease swelling, pain, and fever  This medicine is available with or without a doctor's order  NSAIDs can cause stomach bleeding or kidney problems in certain people  If you take blood thinner medicine, always ask your healthcare provider if NSAIDs are safe for you  Always read the medicine label and follow directions  · Acetaminophen  decreases pain  It is available without a doctor's order  Ask how much to take and how often to take it  Follow directions   Acetaminophen can cause liver damage if not taken correctly  · Prescription pain medicine  may be given  Ask your healthcare provider how to take this medicine safely  Manage your back pain:   · Apply ice  on your back for 15 to 20 minutes every hour or as directed  Use an ice pack, or put crushed ice in a plastic bag  Cover it with a towel  Ice helps decrease swelling and pain  · Apply heat  on your back for 20 to 30 minutes every 2 hours for as many days as directed  Heat helps decrease pain and muscle spasms  You can alternate ice and heat  · Stay active  as much as you can without causing more pain  Bed rest could make your back pain worse  Avoid heavy lifting until your pain is gone  Follow up with your healthcare provider as directed:  Write down your questions so you remember to ask them during your visits  © 2017 [facility] Information is for End User's use only and may not be sold, redistributed or otherwise used for commercial purposes  All illustrations and images included in CareNotes® are the copyrighted property of A D A M [employer] Inc  or [employer] Moya  The above information is an  only  It is not intended as medical advice for individual conditions or treatments  Talk to your doctor, nurse or pharmacist before following any medical regimen to see if it is safe and effective for you

## 2018-09-11 ENCOUNTER — OFFICE VISIT (OUTPATIENT)
Dept: FAMILY MEDICINE CLINIC | Facility: CLINIC | Age: 48
End: 2018-09-11
Payer: COMMERCIAL

## 2018-09-11 VITALS
DIASTOLIC BLOOD PRESSURE: 68 MMHG | RESPIRATION RATE: 18 BRPM | WEIGHT: 172.38 LBS | TEMPERATURE: 97.8 F | HEIGHT: 62 IN | SYSTOLIC BLOOD PRESSURE: 120 MMHG | HEART RATE: 84 BPM | OXYGEN SATURATION: 99 % | BODY MASS INDEX: 31.72 KG/M2

## 2018-09-11 DIAGNOSIS — M54.41 CHRONIC RIGHT-SIDED LOW BACK PAIN WITH RIGHT-SIDED SCIATICA: ICD-10-CM

## 2018-09-11 DIAGNOSIS — E78.1 ESSENTIAL HYPERTRIGLYCERIDEMIA: Primary | ICD-10-CM

## 2018-09-11 DIAGNOSIS — G89.29 CHRONIC RIGHT-SIDED LOW BACK PAIN WITH RIGHT-SIDED SCIATICA: ICD-10-CM

## 2018-09-11 DIAGNOSIS — E53.8 B12 DEFICIENCY: ICD-10-CM

## 2018-09-11 DIAGNOSIS — D50.9 IRON DEFICIENCY ANEMIA, UNSPECIFIED IRON DEFICIENCY ANEMIA TYPE: ICD-10-CM

## 2018-09-11 DIAGNOSIS — R79.89 LOW VITAMIN D LEVEL: ICD-10-CM

## 2018-09-11 DIAGNOSIS — G89.29 CHRONIC PAIN OF LEFT KNEE: ICD-10-CM

## 2018-09-11 DIAGNOSIS — R60.0 LOWER EXTREMITY EDEMA: ICD-10-CM

## 2018-09-11 DIAGNOSIS — M25.562 CHRONIC PAIN OF LEFT KNEE: ICD-10-CM

## 2018-09-11 PROCEDURE — 1036F TOBACCO NON-USER: CPT | Performed by: PHYSICIAN ASSISTANT

## 2018-09-11 PROCEDURE — 99214 OFFICE O/P EST MOD 30 MIN: CPT | Performed by: PHYSICIAN ASSISTANT

## 2018-09-11 PROCEDURE — 3008F BODY MASS INDEX DOCD: CPT | Performed by: PHYSICIAN ASSISTANT

## 2018-09-11 NOTE — PROGRESS NOTES
Assessment/Plan:    Low back pain  She has completed PT and pain is unchanged  Recommend MRI to evaluate  Continue with flexeril PRN  Problem List Items Addressed This Visit        Other    Essential hypertriglyceridemia - Primary    Relevant Orders    Comprehensive metabolic panel    Lipid panel    Left knee pain    Relevant Orders    XR knee 3 vw left non injury    Low vitamin D level    Relevant Orders    Comprehensive metabolic panel    Vitamin D 25 hydroxy    Low back pain     She has completed PT and pain is unchanged  Recommend MRI to evaluate  Continue with flexeril PRN  Relevant Orders    MRI lumbar spine wo contrast    Iron deficiency anemia    Relevant Orders    Iron Panel    CBC and differential    B12 deficiency    Relevant Orders    Vitamin B12      Other Visit Diagnoses     Lower extremity edema       Recommend compression socks  Oscar due to weight gain  Subjective:      Patient ID: Maggie Belcher is a 50 y o  female  HPI here for back pain in lumbar spine  She has been doing PT  Pain is in lumbar region and radiated down RLE posteriorly  She has been using flexeril on PRN basis and it hleps but makes her tired  She also notes she gets right posterior knee pain which does not feel connected to the back  Worse at the end of the day  This pain is constant  She is getting swelling in both legs  Tate abdomen pain, SOB,dyspnea  The following portions of the patient's history were reviewed and updated as appropriate:   She  has a past medical history of Diabetes ([address] )    She   Patient Active Problem List    Diagnosis Date Noted    Iron deficiency anemia 02/20/2018    B12 deficiency 02/20/2018    Fatigue 02/01/2018    Upper back pain 02/01/2018    Need for hepatitis B vaccination 02/01/2018    Chronic cough 10/19/2017    Hearing loss of left ear 10/19/2017    Heart palpitations 10/19/2017    Increased frequency of urination 10/19/2017    Low back pain 2016    Low vitamin D level 10/17/2016    Left knee pain 10/14/2016    Neck pain 2015    Essential hypertriglyceridemia 10/07/2015    Anxiety 2014     She  has a past surgical history that includes  section and Tubal ligation  Her family history includes Cancer in her mother; Diabetes in her mother; Hyperlipidemia in her family; Hypertension in her mother; Lung cancer in her mother; Stroke in her mother  She  reports that she has quit smoking  She has never used smokeless tobacco  She reports that she does not drink alcohol or use drugs  Current Outpatient Prescriptions   Medication Sig Dispense Refill    cyclobenzaprine (FLEXERIL) 5 mg tablet Take 1 tablet (5 mg total) by mouth daily at bedtime 30 tablet 0    ergocalciferol (VITAMIN D2) 50,000 units Take 1 capsule (50,000 Units total) by mouth once a week 4 capsule 0    ferrous sulfate 325 (65 Fe) mg tablet Take 1 tablet (325 mg total) by mouth 2 (two) times a day with meals 60 tablet 5    Omega-3 Fatty Acids (FISH OIL) 1,000 mg Take 1 capsule (1,000 mg total) by mouth 3 (three) times a day 90 capsule 5    omeprazole (PriLOSEC) 20 mg delayed release capsule Take 1 capsule (20 mg total) by mouth daily 30 capsule 1    vitamin B-12 (CYANOCOBALAMIN) 100 MCG tablet Take 1 tablet (100 mcg total) by mouth daily 30 tablet 5     No current facility-administered medications for this visit        Current Outpatient Prescriptions on File Prior to Visit   Medication Sig    cyclobenzaprine (FLEXERIL) 5 mg tablet Take 1 tablet (5 mg total) by mouth daily at bedtime    ergocalciferol (VITAMIN D2) 50,000 units Take 1 capsule (50,000 Units total) by mouth once a week    ferrous sulfate 325 (65 Fe) mg tablet Take 1 tablet (325 mg total) by mouth 2 (two) times a day with meals    Omega-3 Fatty Acids (FISH OIL) 1,000 mg Take 1 capsule (1,000 mg total) by mouth 3 (three) times a day    omeprazole (PriLOSEC) 20 mg delayed release capsule Take 1 capsule (20 mg total) by mouth daily    vitamin B-12 (CYANOCOBALAMIN) 100 MCG tablet Take 1 tablet (100 mcg total) by mouth daily    [DISCONTINUED] baclofen 10 mg tablet Take 1 tablet (10 mg total) by mouth daily at bedtime    [DISCONTINUED] predniSONE 10 mg tablet Take 6 tabs on day 1, take 5 tabs on day 2, take 4 tabs on day 3, take 3 tabs on day 4, take 2 tabs on day 5, and take 1 tab on day 6  No current facility-administered medications on file prior to visit  She has No Known Allergies       Review of Systems   Constitutional: Positive for fatigue and unexpected weight change  Negative for activity change, appetite change and chills  HENT: Negative for dental problem, ear pain, hearing loss and sore throat  Eyes: Negative for visual disturbance  Respiratory: Negative for cough and wheezing  Cardiovascular: Positive for leg swelling  Negative for chest pain  Gastrointestinal: Negative for abdominal pain, constipation, diarrhea and vomiting  Genitourinary: Negative for difficulty urinating and dysuria  Musculoskeletal: Positive for back pain  Negative for arthralgias and myalgias  Skin: Negative for rash  Neurological: Negative for dizziness and headaches  Psychiatric/Behavioral: Negative for behavioral problems  The patient is nervous/anxious (has been feeling anxious because mom passed)  Objective:      /68 (BP Location: Left arm, Patient Position: Sitting, Cuff Size: Adult)   Pulse 84   Temp 97 8 °F (36 6 °C) (Tympanic)   Resp 18   Ht 5' 1 5" (1 562 m)   Wt 78 2 kg (172 lb 6 oz)   LMP 08/19/2018   SpO2 99%   BMI 32 04 kg/m²          Physical Exam   Constitutional: She is oriented to person, place, and time  She appears well-developed and well-nourished  No distress  HENT:   Head: Normocephalic and atraumatic  Right Ear: External ear normal    Left Ear: External ear normal    Eyes: Conjunctivae are normal    Neck: Normal range of motion   Neck supple  No thyromegaly present  Cardiovascular: Normal rate, regular rhythm and normal heart sounds  No murmur heard  Pulmonary/Chest: Effort normal and breath sounds normal  No respiratory distress  She has no wheezes  Trace LE edema     Musculoskeletal: Normal range of motion  She exhibits tenderness (tender over lumbar spine L4-5  -SLR bilaterally  tender over left posterior knee  no swelling)  Lymphadenopathy:     She has no cervical adenopathy  Neurological: She is alert and oriented to person, place, and time  Psychiatric: She has a normal mood and affect  Her behavior is normal    Nursing note and vitals reviewed

## 2019-01-23 ENCOUNTER — TRANSCRIBE ORDERS (OUTPATIENT)
Dept: LAB | Facility: CLINIC | Age: 49
End: 2019-01-23

## 2019-01-23 ENCOUNTER — OFFICE VISIT (OUTPATIENT)
Dept: FAMILY MEDICINE CLINIC | Facility: CLINIC | Age: 49
End: 2019-01-23

## 2019-01-23 ENCOUNTER — LAB (OUTPATIENT)
Dept: LAB | Facility: CLINIC | Age: 49
End: 2019-01-23
Payer: COMMERCIAL

## 2019-01-23 VITALS
SYSTOLIC BLOOD PRESSURE: 124 MMHG | HEART RATE: 89 BPM | WEIGHT: 171 LBS | HEIGHT: 62 IN | BODY MASS INDEX: 31.47 KG/M2 | OXYGEN SATURATION: 96 % | RESPIRATION RATE: 18 BRPM | DIASTOLIC BLOOD PRESSURE: 64 MMHG | TEMPERATURE: 96.3 F

## 2019-01-23 DIAGNOSIS — J35.1 LARGE TONSILS: ICD-10-CM

## 2019-01-23 DIAGNOSIS — G89.29 CHRONIC BILATERAL LOW BACK PAIN WITH RIGHT-SIDED SCIATICA: Primary | ICD-10-CM

## 2019-01-23 DIAGNOSIS — E78.1 ESSENTIAL HYPERTRIGLYCERIDEMIA: ICD-10-CM

## 2019-01-23 DIAGNOSIS — R53.83 FATIGUE, UNSPECIFIED TYPE: ICD-10-CM

## 2019-01-23 DIAGNOSIS — Z12.31 ENCOUNTER FOR SCREENING MAMMOGRAM FOR BREAST CANCER: ICD-10-CM

## 2019-01-23 DIAGNOSIS — E53.8 B12 DEFICIENCY: ICD-10-CM

## 2019-01-23 DIAGNOSIS — R79.89 LOW VITAMIN D LEVEL: ICD-10-CM

## 2019-01-23 DIAGNOSIS — D50.9 IRON DEFICIENCY ANEMIA, UNSPECIFIED IRON DEFICIENCY ANEMIA TYPE: ICD-10-CM

## 2019-01-23 DIAGNOSIS — Z23 NEED FOR INFLUENZA VACCINATION: ICD-10-CM

## 2019-01-23 DIAGNOSIS — J20.9 ACUTE BRONCHITIS, UNSPECIFIED ORGANISM: ICD-10-CM

## 2019-01-23 DIAGNOSIS — R06.83 SNORING: ICD-10-CM

## 2019-01-23 DIAGNOSIS — M54.41 CHRONIC BILATERAL LOW BACK PAIN WITH RIGHT-SIDED SCIATICA: Primary | ICD-10-CM

## 2019-01-23 PROBLEM — N63.0 BREAST LUMP: Status: ACTIVE | Noted: 2017-10-23

## 2019-01-23 LAB
25(OH)D3 SERPL-MCNC: 13.2 NG/ML (ref 30–100)
ALBUMIN SERPL BCP-MCNC: 3.5 G/DL (ref 3.5–5)
ALP SERPL-CCNC: 51 U/L (ref 46–116)
ALT SERPL W P-5'-P-CCNC: 17 U/L (ref 12–78)
ANION GAP SERPL CALCULATED.3IONS-SCNC: 7 MMOL/L (ref 4–13)
AST SERPL W P-5'-P-CCNC: 14 U/L (ref 5–45)
BASOPHILS # BLD AUTO: 0.03 THOUSANDS/ΜL (ref 0–0.1)
BASOPHILS NFR BLD AUTO: 1 % (ref 0–1)
BILIRUB SERPL-MCNC: 0.31 MG/DL (ref 0.2–1)
BUN SERPL-MCNC: 11 MG/DL (ref 5–25)
CALCIUM SERPL-MCNC: 8.6 MG/DL (ref 8.3–10.1)
CHLORIDE SERPL-SCNC: 105 MMOL/L (ref 100–108)
CHOLEST SERPL-MCNC: 176 MG/DL (ref 50–200)
CO2 SERPL-SCNC: 26 MMOL/L (ref 21–32)
CREAT SERPL-MCNC: 0.66 MG/DL (ref 0.6–1.3)
EOSINOPHIL # BLD AUTO: 0.26 THOUSAND/ΜL (ref 0–0.61)
EOSINOPHIL NFR BLD AUTO: 4 % (ref 0–6)
ERYTHROCYTE [DISTWIDTH] IN BLOOD BY AUTOMATED COUNT: 17.5 % (ref 11.6–15.1)
FERRITIN SERPL-MCNC: 3 NG/ML (ref 8–388)
GFR SERPL CREATININE-BSD FRML MDRD: 105 ML/MIN/1.73SQ M
GLUCOSE P FAST SERPL-MCNC: 90 MG/DL (ref 65–99)
HCT VFR BLD AUTO: 29.3 % (ref 34.8–46.1)
HDLC SERPL-MCNC: 53 MG/DL (ref 40–60)
HGB BLD-MCNC: 8.5 G/DL (ref 11.5–15.4)
IMM GRANULOCYTES # BLD AUTO: 0.01 THOUSAND/UL (ref 0–0.2)
IMM GRANULOCYTES NFR BLD AUTO: 0 % (ref 0–2)
IRON SATN MFR SERPL: 5 %
IRON SERPL-MCNC: 23 UG/DL (ref 50–170)
LDLC SERPL CALC-MCNC: 68 MG/DL (ref 0–100)
LYMPHOCYTES # BLD AUTO: 2.08 THOUSANDS/ΜL (ref 0.6–4.47)
LYMPHOCYTES NFR BLD AUTO: 34 % (ref 14–44)
MCH RBC QN AUTO: 23.1 PG (ref 26.8–34.3)
MCHC RBC AUTO-ENTMCNC: 29 G/DL (ref 31.4–37.4)
MCV RBC AUTO: 80 FL (ref 82–98)
MONOCYTES # BLD AUTO: 0.74 THOUSAND/ΜL (ref 0.17–1.22)
MONOCYTES NFR BLD AUTO: 12 % (ref 4–12)
NEUTROPHILS # BLD AUTO: 3.02 THOUSANDS/ΜL (ref 1.85–7.62)
NEUTS SEG NFR BLD AUTO: 49 % (ref 43–75)
NONHDLC SERPL-MCNC: 123 MG/DL
NRBC BLD AUTO-RTO: 0 /100 WBCS
PLATELET # BLD AUTO: 411 THOUSANDS/UL (ref 149–390)
PMV BLD AUTO: 10.8 FL (ref 8.9–12.7)
POTASSIUM SERPL-SCNC: 3.9 MMOL/L (ref 3.5–5.3)
PROT SERPL-MCNC: 7.3 G/DL (ref 6.4–8.2)
RBC # BLD AUTO: 3.68 MILLION/UL (ref 3.81–5.12)
SODIUM SERPL-SCNC: 138 MMOL/L (ref 136–145)
TIBC SERPL-MCNC: 498 UG/DL (ref 250–450)
TRIGL SERPL-MCNC: 276 MG/DL
VIT B12 SERPL-MCNC: 288 PG/ML (ref 100–900)
WBC # BLD AUTO: 6.14 THOUSAND/UL (ref 4.31–10.16)

## 2019-01-23 PROCEDURE — 90686 IIV4 VACC NO PRSV 0.5 ML IM: CPT

## 2019-01-23 PROCEDURE — 36415 COLL VENOUS BLD VENIPUNCTURE: CPT

## 2019-01-23 PROCEDURE — 82607 VITAMIN B-12: CPT

## 2019-01-23 PROCEDURE — 80053 COMPREHEN METABOLIC PANEL: CPT

## 2019-01-23 PROCEDURE — 99214 OFFICE O/P EST MOD 30 MIN: CPT | Performed by: PHYSICIAN ASSISTANT

## 2019-01-23 PROCEDURE — 90471 IMMUNIZATION ADMIN: CPT

## 2019-01-23 PROCEDURE — 83550 IRON BINDING TEST: CPT

## 2019-01-23 PROCEDURE — 82306 VITAMIN D 25 HYDROXY: CPT

## 2019-01-23 PROCEDURE — 85025 COMPLETE CBC W/AUTO DIFF WBC: CPT

## 2019-01-23 PROCEDURE — 82728 ASSAY OF FERRITIN: CPT

## 2019-01-23 PROCEDURE — 83540 ASSAY OF IRON: CPT

## 2019-01-23 PROCEDURE — 80061 LIPID PANEL: CPT

## 2019-01-23 RX ORDER — MELOXICAM 7.5 MG/1
7.5 TABLET ORAL DAILY
Qty: 30 TABLET | Refills: 1 | Status: SHIPPED | OUTPATIENT
Start: 2019-01-23 | End: 2019-08-01

## 2019-01-23 RX ORDER — CYCLOBENZAPRINE HCL 5 MG
5 TABLET ORAL
Qty: 30 TABLET | Refills: 3 | Status: SHIPPED | OUTPATIENT
Start: 2019-01-23 | End: 2019-08-01

## 2019-01-23 RX ORDER — PREDNISONE 10 MG/1
TABLET ORAL
Qty: 14 TABLET | Refills: 0 | Status: SHIPPED | OUTPATIENT
Start: 2019-01-23 | End: 2019-02-11

## 2019-01-23 NOTE — PROGRESS NOTES
Assessment/Plan:    Fatigue  Labs to be done today  She does have a history of DM in the past   I also recommend ENT consult for tonsillectomy  She has large tonsils, fatigue and snoring at night  Problem List Items Addressed This Visit        Other    Low back pain - Primary    Relevant Medications    cyclobenzaprine (FLEXERIL) 5 mg tablet    meloxicam (MOBIC) 7 5 mg tablet    Other Relevant Orders    Ambulatory referral to Physical Therapy    MRI lumbar spine wo contrast    Fatigue     Labs to be done today  She does have a history of DM in the past   I also recommend ENT consult for tonsillectomy  She has large tonsils, fatigue and snoring at night  Relevant Orders    Ambulatory Referral to Otolaryngology      Other Visit Diagnoses     Encounter for screening mammogram for breast cancer        Relevant Orders    Mammo screening bilateral w cad    Large tonsils        Relevant Orders    Ambulatory Referral to Otolaryngology    Snoring        Relevant Orders    Ambulatory Referral to Otolaryngology    Acute bronchitis, unspecified organism        Relevant Medications    predniSONE 10 mg tablet            Subjective:      Patient ID: Nidhi Núñez is a 50 y o  female  HPI 49 y/o F here for back pain  She has had this for years  She has numbness behind left knee and pain that radiates to her right leg posteriorly  She did do PT and it helped some but pain comes back  She was taking flexeril at night to help but it made her timre  She has cold symtpoms for 1 weeks  Sh eis having cough, wheeze, shortenss of breath and sore throat  No hx of asthma or inhaler use in the past       She is also been having fatigue for months  She does sleep well at night but snores  She been told she has large tonsils in the past    The following portions of the patient's history were reviewed and updated as appropriate:   She  has a past medical history of Diabetes ([address] )    She   Patient Active Problem List    Diagnosis Date Noted    Iron deficiency anemia 2018    B12 deficiency 2018    Fatigue 2018    Upper back pain 2018    Need for hepatitis B vaccination 2018    Breast lump 10/23/2017    Chronic cough 10/19/2017    Hearing loss of left ear 10/19/2017    Heart palpitations 10/19/2017    Increased frequency of urination 10/19/2017    Low back pain 2016    Low vitamin D level 10/17/2016    Left knee pain 10/14/2016    Neck pain 2015    Essential hypertriglyceridemia 10/07/2015    Anxiety 2014     She  has a past surgical history that includes  section and Tubal ligation  Her family history includes Cancer in her mother; Diabetes in her mother; Hyperlipidemia in her family; Hypertension in her mother; Lung cancer in her mother; Stroke in her mother  She  reports that she has quit smoking  She has never used smokeless tobacco  She reports that she does not drink alcohol or use drugs    Current Outpatient Prescriptions   Medication Sig Dispense Refill    cyclobenzaprine (FLEXERIL) 5 mg tablet Take 1 tablet (5 mg total) by mouth daily at bedtime 30 tablet 3    ergocalciferol (VITAMIN D2) 50,000 units Take 1 capsule (50,000 Units total) by mouth once a week 4 capsule 0    ferrous sulfate 325 (65 Fe) mg tablet Take 1 tablet (325 mg total) by mouth 2 (two) times a day with meals 60 tablet 5    meloxicam (MOBIC) 7 5 mg tablet Take 1 tablet (7 5 mg total) by mouth daily 30 tablet 1    Omega-3 Fatty Acids (FISH OIL) 1,000 mg Take 1 capsule (1,000 mg total) by mouth 3 (three) times a day 90 capsule 5    omeprazole (PriLOSEC) 20 mg delayed release capsule Take 1 capsule (20 mg total) by mouth daily 30 capsule 1    predniSONE 10 mg tablet Take 1 pill 3 times a day for 3 days, 2 pills a day for 2 day, 1 pill on last day 14 tablet 0    vitamin B-12 (CYANOCOBALAMIN) 100 MCG tablet Take 1 tablet (100 mcg total) by mouth daily 30 tablet 5     No current facility-administered medications for this visit  Current Outpatient Prescriptions on File Prior to Visit   Medication Sig    ergocalciferol (VITAMIN D2) 50,000 units Take 1 capsule (50,000 Units total) by mouth once a week    ferrous sulfate 325 (65 Fe) mg tablet Take 1 tablet (325 mg total) by mouth 2 (two) times a day with meals    Omega-3 Fatty Acids (FISH OIL) 1,000 mg Take 1 capsule (1,000 mg total) by mouth 3 (three) times a day    omeprazole (PriLOSEC) 20 mg delayed release capsule Take 1 capsule (20 mg total) by mouth daily    vitamin B-12 (CYANOCOBALAMIN) 100 MCG tablet Take 1 tablet (100 mcg total) by mouth daily    [DISCONTINUED] cyclobenzaprine (FLEXERIL) 5 mg tablet Take 1 tablet (5 mg total) by mouth daily at bedtime     No current facility-administered medications on file prior to visit  She has No Known Allergies       Review of Systems   Constitutional: Positive for fatigue  Negative for activity change, appetite change, chills and unexpected weight change  HENT: Positive for sore throat and trouble swallowing  Negative for dental problem, ear pain, sinus pain and sinus pressure  Eyes: Negative for visual disturbance  Respiratory: Positive for cough, shortness of breath and wheezing  Cardiovascular: Negative for chest pain  Gastrointestinal: Negative for abdominal pain, constipation, diarrhea and vomiting  Genitourinary: Negative for difficulty urinating and dysuria  Musculoskeletal: Positive for back pain  Negative for arthralgias and myalgias  Skin: Negative for rash  Neurological: Negative for dizziness and headaches  Psychiatric/Behavioral: Negative for behavioral problems  Objective:      /64 (BP Location: Left arm, Patient Position: Sitting, Cuff Size: Large)   Pulse 89   Temp (!) 96 3 °F (35 7 °C) (Tympanic)   Resp 18   Ht 5' 1 5" (1 562 m)   Wt 77 6 kg (171 lb)   LMP 12/27/2018 (Approximate)   SpO2 96%   Breastfeeding?  No BMI 31 79 kg/m²          Physical Exam   Constitutional: She is oriented to person, place, and time  She appears well-developed and well-nourished  No distress  HENT:   Head: Normocephalic and atraumatic  Right Ear: External ear normal    Left Ear: External ear normal    Mouth/Throat: Oropharynx is clear and moist  No oropharyngeal exudate  Large tonsils     Eyes: Conjunctivae are normal    Neck: Normal range of motion  Neck supple  No thyromegaly present  Cardiovascular: Normal rate, regular rhythm and normal heart sounds  No murmur heard  Pulmonary/Chest: Effort normal and breath sounds normal  No respiratory distress  She has no wheezes  Musculoskeletal: She exhibits tenderness (L3-5 bilateral facet joints  Motor 5/5 LE)  She exhibits no deformity  Lymphadenopathy:     She has no cervical adenopathy  Neurological: She is alert and oriented to person, place, and time  Psychiatric: She has a normal mood and affect  Her behavior is normal    Nursing note and vitals reviewed

## 2019-01-23 NOTE — PATIENT INSTRUCTIONS
Acute Bronchitis   WHAT YOU NEED TO KNOW:   What is acute bronchitis? Acute bronchitis is swelling and irritation in the air passages of your lungs  This irritation may cause you to cough or have other breathing problems  Acute bronchitis often starts because of another illness, such as a cold or the flu  The illness spreads from your nose and throat to your windpipe and airways  Bronchitis is often called a chest cold  Acute bronchitis lasts about 3 to 6 weeks and is usually not a serious illness  What causes acute bronchitis? · Infection  caused by a virus, bacteria, or a fungus    · Polluted air  caused by chemical fumes, dust, smoke, allergens, or pollution  What increases my risk for acute bronchitis? · Age, usually older adults    · Smoking cigarettes or being around cigarette smoke    · Chronic lung diseases or chronic sinus infections    · Weakened immune system    · Gastroesophageal reflux disease    · Allergies and environmental changes  What are the signs and symptoms of acute bronchitis? · A cough with sputum that may be clear, yellow, or green    · Feeling more tired than usual, and body aches    · A fever and chills    · Wheezing when you breathe    · A tight chest or pain when you breathe or cough  How is acute bronchitis diagnosed? Your healthcare provider may diagnose bronchitis by your symptoms  If he is not sure, you may need the following:  · Blood tests  will be done to see if your symptoms are caused by an infection  · X-ray  pictures of your lungs and heart may show signs of infection, such as pneumonia  Chest x-rays may also show fluid around your heart and lungs  How is acute bronchitis treated? Your healthcare provider will treat any condition that has caused your acute bronchitis  He may also give you any of the following:  · Ibuprofen or acetaminophen  are medicines that help lower your fever  They are available without a doctor's order   Ask your healthcare provider which medicine is right for you  Ask how much to take and how often to take it  Follow directions  These medicines can cause stomach bleeding if not taken correctly  Ibuprofen can cause kidney damage  Do not take ibuprofen if you have kidney disease, an ulcer, or allergies to aspirin  Acetaminophen can cause liver damage  Do not take more than 4,000 milligrams in 24 hours  · Decongestants  help loosen mucus in your lungs and make it easier to cough up  This can help you breathe easier  · Cough suppressants  decrease your urge to cough  If your cough produces mucus, do not take a cough suppressant unless your healthcare provider tells you to  Your healthcare provider may suggest that you take a cough suppressant at night so you can rest     · Inhalers  may be given  Your healthcare provider may give you one or more inhalers to help you breathe easier and cough less  An inhaler gives your medicine to open your airways  Ask your healthcare provider to show you how to use your inhaler correctly  How can I care for myself when I have acute bronchitis? · Get more rest   Rest helps your body to heal  Slowly start to do more each day  Rest when you feel it is needed  · Avoid irritants in the air  Avoid chemicals, fumes, and dust  Wear a face mask if you must work around dust or fumes  Stay inside on days when air pollution levels are high  If you have allergies, stay inside when pollen counts are high  Do not use aerosol products, such as spray-on deodorant, bug spray, and hair spray  · Do not smoke or be around others who smoke  Nicotine and other chemicals in cigarettes and cigars damages the cilia that move mucus out of your lungs  Ask your healthcare provider for information if you currently smoke and need help to quit  E-cigarettes or smokeless tobacco still contain nicotine  Talk to your healthcare provider before you use these products  · Drink liquids as directed    Liquids help keep your air passages moist and help you cough up mucus  You may need to drink more liquids when you have acute bronchitis  Ask how much liquid to drink each day and which liquids are best for you  · Use a humidifier or vaporizer  Use a cool mist humidifier or a vaporizer to increase air moisture in your home  This may make it easier for you to breathe and help decrease your cough  How can I decrease my risk for acute bronchitis? · Get the vaccinations you need  Ask your healthcare provider if you should get vaccinated against the flu or pneumonia  · Prevent the spread of germs  You can decrease your risk of acute bronchitis and other illnesses by doing the following:     [facility] your hands often with soap and water  Carry germ-killing hand lotion or gel with you  You can use the lotion or gel to clean your hands when soap and water are not available  ¨ Do not touch your eyes, nose, or mouth unless you have washed your hands first     ¨ Always cover your mouth when you cough to prevent the spread of germs  It is best to cough into a tissue or your shirt sleeve instead of into your hand  Ask those around you cover their mouths when they cough  ¨ Try to avoid people who have a cold or the flu  If you are sick, stay away from others as much as possible  When should I seek immediate care? · You cough up blood  · Your lips or fingernails turn blue  · You feel like you are not getting enough air when you breathe  When should I contact my healthcare provider? · You have a fever  · Your breathing problems do not go away or get worse  · Your cough does not get better within 4 weeks  · You have questions or concerns about your condition or care  CARE AGREEMENT:   You have the right to help plan your care  Learn about your health condition and how it may be treated  Discuss treatment options with your caregivers to decide what care you want to receive  You always have the right to refuse treatment  The above information is an  only  It is not intended as medical advice for individual conditions or treatments  Talk to your doctor, nurse or pharmacist before following any medical regimen to see if it is safe and effective for you  © 2017 [address] [employer] Information is for End User's use only and may not be sold, redistributed or otherwise used for commercial purposes  All illustrations and images included in CareNotes® are the copyrighted property of [employer]  or [employer]

## 2019-01-23 NOTE — ASSESSMENT & PLAN NOTE
Labs to be done today  She does have a history of DM in the past   I also recommend ENT consult for tonsillectomy  She has large tonsils, fatigue and snoring at night

## 2019-01-28 DIAGNOSIS — R79.89 LOW VITAMIN D LEVEL: ICD-10-CM

## 2019-01-28 DIAGNOSIS — E53.8 LOW VITAMIN B12 LEVEL: ICD-10-CM

## 2019-01-28 DIAGNOSIS — D50.9 IRON DEFICIENCY ANEMIA, UNSPECIFIED IRON DEFICIENCY ANEMIA TYPE: Primary | ICD-10-CM

## 2019-01-28 RX ORDER — FERROUS SULFATE 325(65) MG
325 TABLET ORAL
Qty: 60 TABLET | Refills: 5 | Status: SHIPPED | OUTPATIENT
Start: 2019-01-28 | End: 2019-05-20 | Stop reason: SINTOL

## 2019-01-28 RX ORDER — LANOLIN ALCOHOL/MO/W.PET/CERES
1000 CREAM (GRAM) TOPICAL DAILY
Qty: 90 TABLET | Refills: 1 | Status: ON HOLD | OUTPATIENT
Start: 2019-01-28 | End: 2019-07-11 | Stop reason: ALTCHOICE

## 2019-01-28 RX ORDER — ERGOCALCIFEROL 1.25 MG/1
50000 CAPSULE ORAL WEEKLY
Qty: 4 CAPSULE | Refills: 5 | Status: SHIPPED | OUTPATIENT
Start: 2019-01-28 | End: 2019-08-01

## 2019-01-28 NOTE — PROGRESS NOTES
Hemoglobin and iron were both lower  I recommend she take iron 3 times a day and I would also recommend we have her see hematology for evaluation to see about iron infusion  She was also low in vitamin d so I recommend and I also recommend she take vitamin B12 because it was low  Her total cholesterol was improved but her triglycerides were still high so it is recommended seh get regular exercise to help lower this  Recommend 150 minutes a week

## 2019-02-01 ENCOUNTER — HOSPITAL ENCOUNTER (OUTPATIENT)
Dept: MAMMOGRAPHY | Facility: HOSPITAL | Age: 49
Discharge: HOME | End: 2019-02-01
Payer: COMMERCIAL

## 2019-02-01 ENCOUNTER — APPOINTMENT (OUTPATIENT)
Dept: MRI IMAGING | Facility: HOSPITAL | Age: 49
End: 2019-02-01
Payer: COMMERCIAL

## 2019-02-01 VITALS — HEIGHT: 62 IN | WEIGHT: 171 LBS | BODY MASS INDEX: 31.47 KG/M2

## 2019-02-01 DIAGNOSIS — Z12.31 ENCOUNTER FOR SCREENING MAMMOGRAM FOR BREAST CANCER: ICD-10-CM

## 2019-02-01 PROCEDURE — 77067 SCR MAMMO BI INCL CAD: CPT

## 2019-02-04 ENCOUNTER — APPOINTMENT (OUTPATIENT)
Dept: PHYSICAL THERAPY | Facility: CLINIC | Age: 49
End: 2019-02-04
Payer: COMMERCIAL

## 2019-02-11 ENCOUNTER — CONSULT (OUTPATIENT)
Dept: HEMATOLOGY ONCOLOGY | Facility: CLINIC | Age: 49
End: 2019-02-11
Payer: COMMERCIAL

## 2019-02-11 ENCOUNTER — HOSPITAL ENCOUNTER (EMERGENCY)
Facility: HOSPITAL | Age: 49
Discharge: HOME | End: 2019-02-11
Attending: EMERGENCY MEDICINE | Admitting: EMERGENCY MEDICINE
Payer: COMMERCIAL

## 2019-02-11 VITALS
HEART RATE: 88 BPM | OXYGEN SATURATION: 99 % | BODY MASS INDEX: 32.02 KG/M2 | WEIGHT: 174 LBS | RESPIRATION RATE: 16 BRPM | DIASTOLIC BLOOD PRESSURE: 80 MMHG | SYSTOLIC BLOOD PRESSURE: 142 MMHG | TEMPERATURE: 98.1 F | HEIGHT: 62 IN

## 2019-02-11 VITALS
BODY MASS INDEX: 32.34 KG/M2 | WEIGHT: 174 LBS | OXYGEN SATURATION: 99 % | RESPIRATION RATE: 18 BRPM | HEART RATE: 73 BPM | SYSTOLIC BLOOD PRESSURE: 134 MMHG | TEMPERATURE: 97.5 F | DIASTOLIC BLOOD PRESSURE: 75 MMHG

## 2019-02-11 DIAGNOSIS — D50.9 IRON DEFICIENCY ANEMIA, UNSPECIFIED IRON DEFICIENCY ANEMIA TYPE: ICD-10-CM

## 2019-02-11 DIAGNOSIS — R51.9 ACUTE NONINTRACTABLE HEADACHE, UNSPECIFIED HEADACHE TYPE: Primary | ICD-10-CM

## 2019-02-11 DIAGNOSIS — R20.0 LEFT ARM NUMBNESS: ICD-10-CM

## 2019-02-11 DIAGNOSIS — E53.8 B12 DEFICIENCY: Primary | ICD-10-CM

## 2019-02-11 PROCEDURE — 99283 EMERGENCY DEPT VISIT LOW MDM: CPT

## 2019-02-11 PROCEDURE — 99244 OFF/OP CNSLTJ NEW/EST MOD 40: CPT | Performed by: INTERNAL MEDICINE

## 2019-02-11 RX ORDER — ACETAMINOPHEN 325 MG/1
1000 TABLET ORAL ONCE
Status: COMPLETED | OUTPATIENT
Start: 2019-02-11 | End: 2019-02-11

## 2019-02-11 RX ADMIN — ACETAMINOPHEN 975 MG: 325 TABLET ORAL at 15:29

## 2019-02-11 NOTE — ED TRIAGE NOTES
Headache since last night - tried tylenol  Reports she has had similar headaches in the past  Tingling on the L side   Photosensitivity

## 2019-02-11 NOTE — ED PROVIDER NOTES
History  Chief Complaint   Patient presents with    Headache     Patient is a 45-year-old female with a history of iron deficiency anemia presents from the outpatient facility with a 2 day history of left-sided headache  Patient states the headache initially started as a light pain the left side of her head last night progressively worsening throughout the day today  Last time patient took Tylenol was last night  Not taking medication today  Describes a constant sharp pain behind the eye that radiates down to left neck  Patient also complaining of some tingling in the left upper extremity  Denies any weakness or numbness  States she has had previous headaches like this in the past   Patient has some photophobia denies any nausea or vomiting  Patient was seen upstairs for an iron infusion and was sent down here for evaluation for concerns of stroke  Patient denies any history of stroke no high blood pressure no diabetes  No smoking history  Patient normal breakfast as well as lunch today  Patient explained to me during history and physical that she was called by her son school which is currently on walked down that she will have to go pick him up  Requesting just medication here and to leave  Refusing any further workup  Prior to Admission Medications   Prescriptions Last Dose Informant Patient Reported? Taking?    Omega-3 Fatty Acids (FISH OIL) 1,000 mg  Self No No   Sig: Take 1 capsule (1,000 mg total) by mouth 3 (three) times a day   cyanocobalamin (VITAMIN B-12) 1,000 mcg tablet  Self No No   Sig: Take 1 tablet (1,000 mcg total) by mouth daily   cyclobenzaprine (FLEXERIL) 5 mg tablet  Self No No   Sig: Take 1 tablet (5 mg total) by mouth daily at bedtime   ergocalciferol (VITAMIN D2) 50,000 units  Self No No   Sig: Take 1 capsule (50,000 Units total) by mouth once a week   ferrous sulfate 325 (65 Fe) mg tablet  Self No No   Sig: Take 1 tablet (325 mg total) by mouth 3 (three) times a day with meals Take with vitamin c   meloxicam (MOBIC) 7 5 mg tablet  Self No No   Sig: Take 1 tablet (7 5 mg total) by mouth daily   omeprazole (PriLOSEC) 20 mg delayed release capsule  Self No No   Sig: Take 1 capsule (20 mg total) by mouth daily   predniSONE 10 mg tablet  Self No No   Sig: Take 1 pill 3 times a day for 3 days, 2 pills a day for 2 day, 1 pill on last day      Facility-Administered Medications: None       Past Medical History:   Diagnosis Date    Diabetes ([facility] [address] )        Past Surgical History:   Procedure Laterality Date     SECTION      TUBAL LIGATION         Family History   Problem Relation Age of Onset    Diabetes Mother     Cancer Mother     Stroke Mother     Hypertension Mother     Lung cancer Mother     Hyperlipidemia Family      I have reviewed and agree with the history as documented  Social History     Tobacco Use    Smoking status: Former Smoker    Smokeless tobacco: Never Used   Substance Use Topics    Alcohol use: No     Comment: social    Drug use: No        Review of Systems   Constitutional: Negative  Negative for activity change and appetite change  HENT: Negative  Eyes: Positive for photophobia  Respiratory: Negative  Negative for cough and shortness of breath  Cardiovascular: Negative  Negative for chest pain  Gastrointestinal: Negative  Negative for nausea and vomiting  Endocrine: Negative  Genitourinary: Negative  Musculoskeletal: Negative  Skin: Negative  Allergic/Immunologic: Negative  Neurological: Positive for headaches  Negative for dizziness, facial asymmetry, weakness and numbness  Hematological: Negative  Psychiatric/Behavioral: Negative  All other systems reviewed and are negative  Physical Exam  Physical Exam   Constitutional: She is oriented to person, place, and time  She appears well-developed and well-nourished  HENT:   Head: Normocephalic     Right Ear: External ear normal    Left Ear: External ear normal    Nose: Nose normal    Mouth/Throat: Oropharynx is clear and moist    Eyes: Pupils are equal, round, and reactive to light  Conjunctivae and EOM are normal    Neck: Normal range of motion  Neck supple  Cardiovascular: Normal rate, regular rhythm, normal heart sounds and intact distal pulses  Pulmonary/Chest: Effort normal and breath sounds normal    Abdominal: Soft  Bowel sounds are normal    Musculoskeletal: Normal range of motion  Neurological: She is alert and oriented to person, place, and time  She has normal reflexes  She displays no atrophy and no tremor  A cranial nerve deficit is present  No sensory deficit  She exhibits normal muscle tone  She displays no seizure activity  Coordination normal  GCS eye subscore is 4  GCS verbal subscore is 5  GCS motor subscore is 6  Skin: Skin is warm and dry  Capillary refill takes less than 2 seconds  Psychiatric: She has a normal mood and affect  Her behavior is normal    Nursing note and vitals reviewed        Vital Signs  ED Triage Vitals [02/11/19 1519]   Temperature Pulse Respirations Blood Pressure SpO2   97 5 °F (36 4 °C) 73 18 134/75 99 %      Temp Source Heart Rate Source Patient Position - Orthostatic VS BP Location FiO2 (%)   Tympanic Monitor Lying Left arm --      Pain Score       7           Vitals:    02/11/19 1519   BP: 134/75   Pulse: 73   Patient Position - Orthostatic VS: Lying       Visual Acuity      ED Medications  Medications   acetaminophen (TYLENOL) tablet 975 mg (has no administration in time range)       Diagnostic Studies  Results Reviewed     None                 No orders to display              Procedures  Procedures       Phone Contacts  ED Phone Contact    ED Course             Stroke Assessment     Row Name 02/11/19 1520             NIH Stroke Scale    Interval  Baseline      Level of Consciousness (1a )  0      LOC Questions (1b )  0      LOC Commands (1c )  0      Best Gaze (2 )  0      Visual (3 )  0      Facial Palsy (4 )  0      Motor Arm, Left (5a )  0      Motor Arm, Right (5b )  0      Motor Leg, Left (6a )  0      Motor Leg, Right (6b )  0      Limb Ataxia (7 )  0      Sensory (8 )  0      Best Language (9 )  0      Dysarthria (10 )  0      Extinction and Inattention (11 ) (Formerly Neglect)  0      Total  0          First Filed Value   TPA Decision  Patient not a TPA candidate  Patient is not a candidate options  Symptoms resolved/clearly non disabling  MDM  Number of Diagnoses or Management Options  Acute nonintractable headache, unspecified headache type:   Diagnosis management comments: Patient is a 51-year-old female complaining of a left-sided headache for the last 2 days  States similar previous headaches  Refusing any further workup including IV CT  States that she has to go  Explained can return at any time  Will discharge with dose of Tylenol here  Amount and/or Complexity of Data Reviewed  Review and summarize past medical records: yes        Disposition  Final diagnoses:   Acute nonintractable headache, unspecified headache type     Time reflects when diagnosis was documented in both MDM as applicable and the Disposition within this note     Time User Action Codes Description Comment    2/11/2019  3:22 PM Katie Sandoval Add [R51] Acute nonintractable headache, unspecified headache type       ED Disposition     ED Disposition Condition Date/Time Comment    Discharge Fair Mon Feb 11, 2019  3:21 PM Dominguez Garcia discharge to home/self care  Follow-up Information     Follow up With Specialties Details Why Contact Info    Riley Leslie PA-C Family Medicine, Physician Assistant   [address]  [phone]            Patient's Medications   Discharge Prescriptions    No medications on file     No discharge procedures on file      ED Provider  Electronically Signed by           Vicente Bahena MD  02/11/19 6560

## 2019-02-11 NOTE — LETTER
February 11, 2019     [address] [address]    Patient: Paul Hernández   YOB: 1970   Date of Visit: 2/11/2019       Dear Dr Darshana Baltazar: Thank you for referring Paul Hernández to me for evaluation  Below are my notes for this consultation  If you have questions, please do not hesitate to call me  I look forward to following your patient along with you  Sincerely,        Desmond Pedraza MD        CC: No Recipients  Desmond Pedraza MD  2/11/2019  3:04 PM  Signed  Hematology/Oncology Outpatient Consult  Paul Hernández 50 y o  female 1970 [id number]    Date:  2/11/2019    Assessment and Plan:  1  Iron deficiency anemia, unspecified iron deficiency anemia type  The patient is very iron deficient which is most likely related to her heavy menstrual cycle  We will treat her with iron IV x2 doses of injectafer instead of oral supplements since she has some history of constipation  She also will get 2 injections of vitamin B12 since she has borderline low vitamin B12 level and should be continued on the oral vitamin B12 preferably with sublingual vitamin B12 supplements daily  We will then see her again about 3 months from now and recheck her workup for anemia prior to her next visit  - CBC and differential; Future  - Comprehensive metabolic panel; Future  - Ferritin; Future  - Iron Panel; Future  - Vitamin B12; Future    2  B12 deficiency  See above  - CBC and differential; Future  - Comprehensive metabolic panel; Future  - Ferritin; Future  - Iron Panel; Future  - Vitamin B12; Future    3   Left arm numbness  The patient seemed very concerned about her symptoms including headaches and numbness of the left upper extremity I did offer her to be evaluated in the emergency room she stated that she would rather go to the emergency room then the home with the current symptoms   - Transfer to other facility      HPI:  This is a 77-year-old he female with a history of borderline diabetes, heavy menstrual cycle and iron deficiency anemia  The patient has blood work on the 23rd of January which showed hemoglobin level of 8 5 with MCV of 80 her white cells were within normal range with slightly above normal platelet count  Her iron panel showed significant iron deficiency with ferritin of 3 and low vitamin B12 level of 288  Interval history:  The patient complained today in the office about significant headaches dizziness and left arm numbness which started last night  She seems to be very concerned about her symptoms  ROS: Review of Systems   Constitutional: Positive for fatigue  Negative for activity change, appetite change, chills, diaphoresis, fever and unexpected weight change  HENT: Positive for hearing loss  Negative for congestion, dental problem, ear discharge, ear pain, facial swelling, mouth sores, nosebleeds, postnasal drip, sore throat, tinnitus and trouble swallowing  Eyes: Negative for discharge, redness, itching and visual disturbance  Respiratory: Positive for cough and shortness of breath  Negative for chest tightness and wheezing  Cardiovascular: Negative for chest pain, palpitations and leg swelling  Gastrointestinal: Positive for nausea  Negative for abdominal distention, abdominal pain, anal bleeding, blood in stool, constipation, diarrhea and vomiting  Genitourinary: Negative for difficulty urinating, dysuria, flank pain, frequency, hematuria and urgency  Musculoskeletal: Negative for arthralgias, back pain, gait problem, joint swelling, myalgias and neck pain  Skin: Negative for color change, pallor, rash and wound  Neurological: Positive for dizziness, numbness (Of the left upper extremity since yesterday) and headaches  Negative for syncope, speech difficulty, weakness and light-headedness  Hematological: Negative for adenopathy  Does not bruise/bleed easily     Psychiatric/Behavioral: Positive for sleep disturbance  Negative for agitation, behavioral problems and confusion         Past Medical History:   Diagnosis Date    Diabetes [facility])        Past Surgical History:   Procedure Laterality Date     SECTION      TUBAL LIGATION         Social History     Socioeconomic History    Marital status: Single     Spouse name: None    Number of children: None    Years of education: None    Highest education level: None   Occupational History    None   Social Needs    Financial resource strain: None    Food insecurity:     Worry: None     Inability: None    Transportation needs:     Medical: None     Non-medical: None   Tobacco Use    Smoking status: Former Smoker    Smokeless tobacco: Never Used   Substance and Sexual Activity    Alcohol use: No     Comment: social    Drug use: No    Sexual activity: None     Comment: Birth control not practiced- High sexual behavior   Lifestyle    Physical activity:     Days per week: None     Minutes per session: None    Stress: None   Relationships    Social connections:     Talks on phone: None     Gets together: None     Attends Religion service: None     Active member of club or organization: None     Attends meetings of clubs or organizations: None     Relationship status: None    Intimate partner violence:     Fear of current or ex partner: None     Emotionally abused: None     Physically abused: None     Forced sexual activity: None   Other Topics Concern    None   Social History Narrative    Caffeine use    Uses safety equipment seatbelt       Family History   Problem Relation Age of Onset    Diabetes Mother     Cancer Mother     Stroke Mother     Hypertension Mother     Lung cancer Mother     Hyperlipidemia Family        No Known Allergies      Current Outpatient Medications:     cyanocobalamin (VITAMIN B-12) 1,000 mcg tablet, Take 1 tablet (1,000 mcg total) by mouth daily, Disp: 90 tablet, Rfl: 1    cyclobenzaprine (FLEXERIL) 5 mg tablet, Take 1 tablet (5 mg total) by mouth daily at bedtime, Disp: 30 tablet, Rfl: 3    ergocalciferol (VITAMIN D2) 50,000 units, Take 1 capsule (50,000 Units total) by mouth once a week, Disp: 4 capsule, Rfl: 5    ferrous sulfate 325 (65 Fe) mg tablet, Take 1 tablet (325 mg total) by mouth 3 (three) times a day with meals Take with vitamin c, Disp: 60 tablet, Rfl: 5    meloxicam (MOBIC) 7 5 mg tablet, Take 1 tablet (7 5 mg total) by mouth daily, Disp: 30 tablet, Rfl: 1    Omega-3 Fatty Acids (FISH OIL) 1,000 mg, Take 1 capsule (1,000 mg total) by mouth 3 (three) times a day, Disp: 90 capsule, Rfl: 5    omeprazole (PriLOSEC) 20 mg delayed release capsule, Take 1 capsule (20 mg total) by mouth daily, Disp: 30 capsule, Rfl: 1    predniSONE 10 mg tablet, Take 1 pill 3 times a day for 3 days, 2 pills a day for 2 day, 1 pill on last day, Disp: 14 tablet, Rfl: 0      Physical Exam:  /80   Pulse 88   Temp 98 1 °F (36 7 °C) (Tympanic)   Resp 16   Ht 5' 1 5" (1 562 m)   Wt 78 9 kg (174 lb)   LMP 02/01/2019 (Exact Date)   SpO2 99%   BMI 32 34 kg/m²      Physical Exam      Labs:  Lab Results   Component Value Date    WBC 6 14 01/23/2019    HGB 8 5 (L) 01/23/2019    HCT 29 3 (L) 01/23/2019    MCV 80 (L) 01/23/2019     (H) 01/23/2019     Lab Results   Component Value Date     08/25/2015    K 3 9 01/23/2019     01/23/2019    CO2 26 01/23/2019    ANIONGAP 8 08/25/2015    BUN 11 01/23/2019    CREATININE 0 66 01/23/2019    GLUCOSE 88 08/25/2015    GLUF 90 01/23/2019    CALCIUM 8 6 01/23/2019    AST 14 01/23/2019    ALT 17 01/23/2019    ALKPHOS 51 01/23/2019    PROT 7 3 08/25/2015    BILITOT 0 28 08/25/2015    EGFR 105 01/23/2019       Patient voiced understanding and agreement in the above discussion  Aware to contact our office with questions/symptoms in the interim

## 2019-02-11 NOTE — PROGRESS NOTES
Hematology/Oncology Outpatient Consult  Julio Bowles 50 y o  female 1970 [id number]    Date:  2/11/2019    Assessment and Plan:  1  Iron deficiency anemia, unspecified iron deficiency anemia type  The patient is very iron deficient which is most likely related to her heavy menstrual cycle  We will treat her with iron IV x2 doses of injectafer instead of oral supplements since she has some history of constipation  She also will get 2 injections of vitamin B12 since she has borderline low vitamin B12 level and should be continued on the oral vitamin B12 preferably with sublingual vitamin B12 supplements daily  We will then see her again about 3 months from now and recheck her workup for anemia prior to her next visit  - CBC and differential; Future  - Comprehensive metabolic panel; Future  - Ferritin; Future  - Iron Panel; Future  - Vitamin B12; Future    2  B12 deficiency  See above  - CBC and differential; Future  - Comprehensive metabolic panel; Future  - Ferritin; Future  - Iron Panel; Future  - Vitamin B12; Future    3  Left arm numbness  The patient seemed very concerned about her symptoms including headaches and numbness of the left upper extremity I did offer her to be evaluated in the emergency room she stated that she would rather go to the emergency room then the home with the current symptoms   - Transfer to other facility      HPI:  This is a 42-year-old he female with a history of borderline diabetes, heavy menstrual cycle and iron deficiency anemia  The patient has blood work on the 23rd of January which showed hemoglobin level of 8 5 with MCV of 80 her white cells were within normal range with slightly above normal platelet count  Her iron panel showed significant iron deficiency with ferritin of 3 and low vitamin B12 level of 288  Interval history:  The patient complained today in the office about significant headaches dizziness and left arm numbness which started last night    She seems to be very concerned about her symptoms  ROS: Review of Systems   Constitutional: Positive for fatigue  Negative for activity change, appetite change, chills, diaphoresis, fever and unexpected weight change  HENT: Positive for hearing loss  Negative for congestion, dental problem, ear discharge, ear pain, facial swelling, mouth sores, nosebleeds, postnasal drip, sore throat, tinnitus and trouble swallowing  Eyes: Negative for discharge, redness, itching and visual disturbance  Respiratory: Positive for cough and shortness of breath  Negative for chest tightness and wheezing  Cardiovascular: Negative for chest pain, palpitations and leg swelling  Gastrointestinal: Positive for nausea  Negative for abdominal distention, abdominal pain, anal bleeding, blood in stool, constipation, diarrhea and vomiting  Genitourinary: Negative for difficulty urinating, dysuria, flank pain, frequency, hematuria and urgency  Musculoskeletal: Negative for arthralgias, back pain, gait problem, joint swelling, myalgias and neck pain  Skin: Negative for color change, pallor, rash and wound  Neurological: Positive for dizziness, numbness (Of the left upper extremity since yesterday) and headaches  Negative for syncope, speech difficulty, weakness and light-headedness  Hematological: Negative for adenopathy  Does not bruise/bleed easily  Psychiatric/Behavioral: Positive for sleep disturbance  Negative for agitation, behavioral problems and confusion         Past Medical History:   Diagnosis Date    Diabetes [facility])        Past Surgical History:   Procedure Laterality Date     SECTION      TUBAL LIGATION         Social History     Socioeconomic History    Marital status: Single     Spouse name: None    Number of children: None    Years of education: None    Highest education level: None   Occupational History    None   Social Needs    Financial resource strain: None    Food insecurity:     Worry: None     Inability: None    Transportation needs:     Medical: None     Non-medical: None   Tobacco Use    Smoking status: Former Smoker    Smokeless tobacco: Never Used   Substance and Sexual Activity    Alcohol use: No     Comment: social    Drug use: No    Sexual activity: None     Comment: Birth control not practiced- High sexual behavior   Lifestyle    Physical activity:     Days per week: None     Minutes per session: None    Stress: None   Relationships    Social connections:     Talks on phone: None     Gets together: None     Attends Synagogue service: None     Active member of club or organization: None     Attends meetings of clubs or organizations: None     Relationship status: None    Intimate partner violence:     Fear of current or ex partner: None     Emotionally abused: None     Physically abused: None     Forced sexual activity: None   Other Topics Concern    None   Social History Narrative    Caffeine use    Uses safety equipment seatbelt       Family History   Problem Relation Age of Onset    Diabetes Mother     Cancer Mother     Stroke Mother     Hypertension Mother     Lung cancer Mother     Hyperlipidemia Family        No Known Allergies      Current Outpatient Medications:     cyanocobalamin (VITAMIN B-12) 1,000 mcg tablet, Take 1 tablet (1,000 mcg total) by mouth daily, Disp: 90 tablet, Rfl: 1    cyclobenzaprine (FLEXERIL) 5 mg tablet, Take 1 tablet (5 mg total) by mouth daily at bedtime, Disp: 30 tablet, Rfl: 3    ergocalciferol (VITAMIN D2) 50,000 units, Take 1 capsule (50,000 Units total) by mouth once a week, Disp: 4 capsule, Rfl: 5    ferrous sulfate 325 (65 Fe) mg tablet, Take 1 tablet (325 mg total) by mouth 3 (three) times a day with meals Take with vitamin c, Disp: 60 tablet, Rfl: 5    meloxicam (MOBIC) 7 5 mg tablet, Take 1 tablet (7 5 mg total) by mouth daily, Disp: 30 tablet, Rfl: 1    Omega-3 Fatty Acids (FISH OIL) 1,000 mg, Take 1 capsule (1,000 mg total) by mouth 3 (three) times a day, Disp: 90 capsule, Rfl: 5    omeprazole (PriLOSEC) 20 mg delayed release capsule, Take 1 capsule (20 mg total) by mouth daily, Disp: 30 capsule, Rfl: 1    predniSONE 10 mg tablet, Take 1 pill 3 times a day for 3 days, 2 pills a day for 2 day, 1 pill on last day, Disp: 14 tablet, Rfl: 0      Physical Exam:  /80   Pulse 88   Temp 98 1 °F (36 7 °C) (Tympanic)   Resp 16   Ht 5' 1 5" (1 562 m)   Wt 78 9 kg (174 lb)   LMP 02/01/2019 (Exact Date)   SpO2 99%   BMI 32 34 kg/m²     Physical Exam      Labs:  Lab Results   Component Value Date    WBC 6 14 01/23/2019    HGB 8 5 (L) 01/23/2019    HCT 29 3 (L) 01/23/2019    MCV 80 (L) 01/23/2019     (H) 01/23/2019     Lab Results   Component Value Date     08/25/2015    K 3 9 01/23/2019     01/23/2019    CO2 26 01/23/2019    ANIONGAP 8 08/25/2015    BUN 11 01/23/2019    CREATININE 0 66 01/23/2019    GLUCOSE 88 08/25/2015    GLUF 90 01/23/2019    CALCIUM 8 6 01/23/2019    AST 14 01/23/2019    ALT 17 01/23/2019    ALKPHOS 51 01/23/2019    PROT 7 3 08/25/2015    BILITOT 0 28 08/25/2015    EGFR 105 01/23/2019       Patient voiced understanding and agreement in the above discussion  Aware to contact our office with questions/symptoms in the interim

## 2019-02-13 ENCOUNTER — EVALUATION (OUTPATIENT)
Dept: PHYSICAL THERAPY | Facility: CLINIC | Age: 49
End: 2019-02-13
Payer: COMMERCIAL

## 2019-02-13 DIAGNOSIS — G89.29 CHRONIC BILATERAL LOW BACK PAIN WITH RIGHT-SIDED SCIATICA: Primary | ICD-10-CM

## 2019-02-13 DIAGNOSIS — M54.41 CHRONIC BILATERAL LOW BACK PAIN WITH RIGHT-SIDED SCIATICA: Primary | ICD-10-CM

## 2019-02-13 PROCEDURE — G8990 OTHER PT/OT CURRENT STATUS: HCPCS | Performed by: PHYSICAL THERAPIST

## 2019-02-13 PROCEDURE — 97162 PT EVAL MOD COMPLEX 30 MIN: CPT | Performed by: PHYSICAL THERAPIST

## 2019-02-13 PROCEDURE — G8991 OTHER PT/OT GOAL STATUS: HCPCS | Performed by: PHYSICAL THERAPIST

## 2019-02-13 NOTE — PROGRESS NOTES
PT Evaluation     Today's date: 2019  Patient name: Thuy Waters  : 1970  MRN: [id number]  Referring provider: Amanda Leonard PA-C  Dx:   Encounter Diagnosis     ICD-10-CM    1  Chronic bilateral low back pain with right-sided sciatica M54 41 Ambulatory referral to Physical Therapy    G89 29                   Assessment  Assessment details: Pt is a pleasant 50 y o  female presenting to outpatient physical therapy with Chronic bilateral low back pain with right-sided sciatica  (primary encounter diagnosis)   Pt presents with pain, decreased range of motion, decreased strength, and decreased tolerance to activity  Pt is a good candidate for outpatient physical therapy and would benefit from skilled physical therapy to address limitations and to achieve goals  Thank you for this referral    Impairments: abnormal coordination, abnormal or restricted ROM, activity intolerance, impaired physical strength and pain with function  Understanding of Dx/Px/POC: good   Prognosis: good    Goals  Short Term Goals: To be achieved by 4 weeks    1) Patient to be independent with basic HEP  2) Decrease pain to 6/10 at worst   3) Increase ROM by 5 degrees or greater in all deficient planes  4) Increase strength by 1/2 MMT grade or greater in all deficient planes  5) Patient to report decreased sleep interruption secondary to pain  Long Term Goals: To be achieved by discharge    1) FOTO equal to or greater than 75   2) Patient to be independent with comprehensive HEP  3) Decrease pain to 4/10 at worst  for improved quality of life  4) Increase strength to 5/5 MMT grade in all deficient planes to improve a/iadls  5) Increase ROM to within normal limits  6) Patient to report no sleep interruption secondary to pain        Plan  Patient would benefit from: PT eval and skilled PT  Planned modality interventions: cryotherapy and thermotherapy: hydrocollator packs  Planned therapy interventions: IADL retraining, body mechanics training, flexibility, functional ROM exercises, home exercise program, neuromuscular re-education, manual therapy, postural training, strengthening, stretching, therapeutic activities, therapeutic exercise and joint mobilization  Frequency: 2x week  Duration in visits: 8  Duration in weeks: 4  Treatment plan discussed with: patient        Subjective Evaluation    History of Present Illness  Mechanism of injury: Pt is a 50year old female presenting to PT with 3 year history of low back pain  Pt reports no injury, trauma or preceding event that caused her pain  She reports gradual onset and worsening over the past 3 years  Pt reports she has had injections and PT in the past for her pain with good response  Pt reports her pain has been worsening over the past few months, so she went to PCP for further evaluation  No diagnostic imaging performed  Pt prescribed Meloxicam for pain which she states provides some pain relief  Pt referred to OPPT  Pain Location:  Bilateral lumbar spine, radiating to right gluteal region and right lateral thigh to knee, left posterior knee region     Pain Type: sharp, denies bilateral LE numbness/tingling    Pain Intensity:  Current:  7  Best: 3  Worst:  10      Pt reports increased pain and/or difficulty with:  Lifting, supine-sit transfers, 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), standing > 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, sitting > 1 hour; pt reports sleep disturbance secondary to pain waking 2 times/night      Pt reports decreased pain with: Meloxicam, heating pad, left side-lying      Diagnostic Tests  No diagnostic tests performed  Treatments  Previous treatment: injection treatment, medication and physical therapy  Current treatment: medication  Patient Goals  Patient goals for therapy: increased strength, independence with ADLs/IADLs, increased motion and decreased pain  Patient goal: "To get better "         Objective     Concurrent Complaints  Positive for night pain, disturbed sleep and bowel dysfunction  Negative for bladder dysfunction and saddle (S4) numbness    Postural Observations    Additional Postural Observation Details  Increased lumbar lordosis    Palpation   Left   Hypertonic in the erector spinae, iliopsoas and lumbar paraspinals  Muscle spasm in the erector spinae and lumbar paraspinals  Tenderness of the erector spinae and lumbar paraspinals  Right   Hypertonic in the erector spinae, iliopsoas and lumbar paraspinals  Muscle spasm in the erector spinae and lumbar paraspinals  Tenderness of the erector spinae and lumbar paraspinals  Tenderness     Additional Tenderness Details  TTP: bilateral lumbar paraspinals, L4-L5 SPs    Lumbar Joint Mobility: L1-3 normal, L4-L5 hypermobile    Flexibility: moderate bilateral iliopsoas tightness present    Neurological Testing     Sensation     Lumbar   Left   Intact: light touch    Right   Intact: light touch    Reflexes   Left   Patellar (L4): normal (2+)  Achilles (S1): normal (2+)    Right   Patellar (L4): normal (2+)  Achilles (S1): normal (2+)    Active Range of Motion     Lumbar   Flexion: 40 degrees  with pain  Extension: 10 degrees  with pain  Left lateral flexion: 25 degrees       Right lateral flexion: 20 degrees  with pain  Left rotation: 35 degrees  WFL  Right rotation: 35 degrees  WFL    Strength/Myotome Testing     Left Hip   Planes of Motion   Flexion: 5  Extension: 4  Abduction: 4+  Adduction: 5  External rotation: 4+  Internal rotation: 4+    Right Hip   Planes of Motion   Flexion: 5  Extension: 4-  Abduction: 4+  Adduction: 5  External rotation: 4+  Internal rotation: 4+    Left Knee   Flexion: 5  Extension: 5    Right Knee   Flexion: 4+  Extension: 5    Left Ankle/Foot   Dorsiflexion: 5  Plantar flexion: 5  Inversion: 5  Eversion: 5    Right Ankle/Foot   Dorsiflexion: 5  Plantar flexion: 5  Inversion: 5  Eversion: 5    Tests     Lumbar   Positive prone instability        Left   Negative passive SLR and slump test  Right   Positive passive SLR  Negative slump test      Left Pelvic Girdle/Sacrum   Negative: active SLR test      Right Pelvic Girdle/Sacrum   Positive: active SLR test      Left Hip   Negative FATEMEH, FADIR and long sit  Right Hip   Negative FATEMEH, FADIR and long sit             Precautions: anxiety, h/o DM, anemia, L ear hearing loss    Daily Treatment Diary       Assessment 2/13            Kevin/Jennifer BETH            FOTO 37            POC Signed             HEP Issued              Manuals             1) B Lumbar Paraspinals STM  2) B Iliopsoas Stretch  3)B Iliopsoas STM             Exercise Diary              SKTC             PPT             PPT w/iso hip flexion             PPT w/iso hip add             PPT w/march             PPT w/PBall Press 3 Way             Clamshells              Reverse Clamshells             3 Way Kneeling Stretch             Seated PBall Lumbar Flexion             Lifting Biomechanics             TA Brace w/Mini Squat             TA Brace w/ TB Chest Press             Supine-Sit Transfer Training MD            Sleep Education MD                                                                Modalities             MHP L-spine  90/90 Position  Pre-Tx                            HEP: NV

## 2019-02-15 RX ORDER — CYANOCOBALAMIN 1000 UG/ML
1000 INJECTION INTRAMUSCULAR; SUBCUTANEOUS ONCE
Status: COMPLETED | OUTPATIENT
Start: 2019-02-18 | End: 2019-02-18

## 2019-02-15 RX ORDER — SODIUM CHLORIDE 9 MG/ML
20 INJECTION, SOLUTION INTRAVENOUS CONTINUOUS
Status: DISCONTINUED | OUTPATIENT
Start: 2019-02-15 | End: 2019-02-22 | Stop reason: HOSPADM

## 2019-02-18 ENCOUNTER — HOSPITAL ENCOUNTER (OUTPATIENT)
Dept: INFUSION CENTER | Facility: HOSPITAL | Age: 49
Discharge: HOME | End: 2019-02-18
Payer: COMMERCIAL

## 2019-02-18 ENCOUNTER — APPOINTMENT (OUTPATIENT)
Dept: LAB | Facility: CLINIC | Age: 49
End: 2019-02-18
Payer: COMMERCIAL

## 2019-02-18 VITALS
SYSTOLIC BLOOD PRESSURE: 131 MMHG | DIASTOLIC BLOOD PRESSURE: 67 MMHG | HEART RATE: 72 BPM | RESPIRATION RATE: 16 BRPM | TEMPERATURE: 98.5 F

## 2019-02-18 DIAGNOSIS — E53.8 B12 DEFICIENCY: ICD-10-CM

## 2019-02-18 DIAGNOSIS — D50.9 IRON DEFICIENCY ANEMIA, UNSPECIFIED IRON DEFICIENCY ANEMIA TYPE: ICD-10-CM

## 2019-02-18 LAB
ALBUMIN SERPL BCP-MCNC: 3.5 G/DL (ref 3.5–5)
ALP SERPL-CCNC: 50 U/L (ref 46–116)
ALT SERPL W P-5'-P-CCNC: 21 U/L (ref 12–78)
ANION GAP SERPL CALCULATED.3IONS-SCNC: 7 MMOL/L (ref 4–13)
AST SERPL W P-5'-P-CCNC: 15 U/L (ref 5–45)
BASOPHILS # BLD AUTO: 0.06 THOUSANDS/ΜL (ref 0–0.1)
BASOPHILS NFR BLD AUTO: 1 % (ref 0–1)
BILIRUB SERPL-MCNC: 0.4 MG/DL (ref 0.2–1)
BUN SERPL-MCNC: 12 MG/DL (ref 5–25)
CALCIUM SERPL-MCNC: 8.9 MG/DL (ref 8.3–10.1)
CHLORIDE SERPL-SCNC: 105 MMOL/L (ref 100–108)
CO2 SERPL-SCNC: 24 MMOL/L (ref 21–32)
CREAT SERPL-MCNC: 0.69 MG/DL (ref 0.6–1.3)
EOSINOPHIL # BLD AUTO: 0.21 THOUSAND/ΜL (ref 0–0.61)
EOSINOPHIL NFR BLD AUTO: 3 % (ref 0–6)
ERYTHROCYTE [DISTWIDTH] IN BLOOD BY AUTOMATED COUNT: 18.2 % (ref 11.6–15.1)
FERRITIN SERPL-MCNC: 3 NG/ML (ref 8–388)
GFR SERPL CREATININE-BSD FRML MDRD: 103 ML/MIN/1.73SQ M
GLUCOSE P FAST SERPL-MCNC: 91 MG/DL (ref 65–99)
HCT VFR BLD AUTO: 29.1 % (ref 34.8–46.1)
HGB BLD-MCNC: 8.6 G/DL (ref 11.5–15.4)
IMM GRANULOCYTES # BLD AUTO: 0.02 THOUSAND/UL (ref 0–0.2)
IMM GRANULOCYTES NFR BLD AUTO: 0 % (ref 0–2)
IRON SATN MFR SERPL: 5 %
IRON SERPL-MCNC: 21 UG/DL (ref 50–170)
LYMPHOCYTES # BLD AUTO: 2.23 THOUSANDS/ΜL (ref 0.6–4.47)
LYMPHOCYTES NFR BLD AUTO: 33 % (ref 14–44)
MCH RBC QN AUTO: 23.5 PG (ref 26.8–34.3)
MCHC RBC AUTO-ENTMCNC: 29.6 G/DL (ref 31.4–37.4)
MCV RBC AUTO: 80 FL (ref 82–98)
MONOCYTES # BLD AUTO: 0.73 THOUSAND/ΜL (ref 0.17–1.22)
MONOCYTES NFR BLD AUTO: 11 % (ref 4–12)
NEUTROPHILS # BLD AUTO: 3.42 THOUSANDS/ΜL (ref 1.85–7.62)
NEUTS SEG NFR BLD AUTO: 52 % (ref 43–75)
NRBC BLD AUTO-RTO: 0 /100 WBCS
PLATELET # BLD AUTO: 463 THOUSANDS/UL (ref 149–390)
PMV BLD AUTO: 10.4 FL (ref 8.9–12.7)
POTASSIUM SERPL-SCNC: 3.9 MMOL/L (ref 3.5–5.3)
PROT SERPL-MCNC: 7.3 G/DL (ref 6.4–8.2)
RBC # BLD AUTO: 3.66 MILLION/UL (ref 3.81–5.12)
SODIUM SERPL-SCNC: 136 MMOL/L (ref 136–145)
TIBC SERPL-MCNC: 443 UG/DL (ref 250–450)
VIT B12 SERPL-MCNC: 500 PG/ML (ref 100–900)
WBC # BLD AUTO: 6.67 THOUSAND/UL (ref 4.31–10.16)

## 2019-02-18 PROCEDURE — 82728 ASSAY OF FERRITIN: CPT

## 2019-02-18 PROCEDURE — 83550 IRON BINDING TEST: CPT

## 2019-02-18 PROCEDURE — 83540 ASSAY OF IRON: CPT

## 2019-02-18 PROCEDURE — 36415 COLL VENOUS BLD VENIPUNCTURE: CPT

## 2019-02-18 PROCEDURE — 85025 COMPLETE CBC W/AUTO DIFF WBC: CPT

## 2019-02-18 PROCEDURE — 80053 COMPREHEN METABOLIC PANEL: CPT

## 2019-02-18 PROCEDURE — 96374 THER/PROPH/DIAG INJ IV PUSH: CPT

## 2019-02-18 PROCEDURE — 82607 VITAMIN B-12: CPT

## 2019-02-18 PROCEDURE — 96372 THER/PROPH/DIAG INJ SC/IM: CPT

## 2019-02-18 RX ADMIN — CYANOCOBALAMIN 1000 MCG: 1000 INJECTION INTRAMUSCULAR; SUBCUTANEOUS at 14:37

## 2019-02-18 RX ADMIN — SODIUM CHLORIDE 20 ML/HR: 9 INJECTION, SOLUTION INTRAVENOUS at 14:27

## 2019-02-18 RX ADMIN — FERRIC CARBOXYMALTOSE INJECTION 750 MG: 50 INJECTION, SOLUTION INTRAVENOUS at 14:36

## 2019-02-18 NOTE — PROGRESS NOTES
Pt here for #1/2 injectafer and vit b12  Tolerated well without complications  AVS provided and next appt confirmed

## 2019-02-21 ENCOUNTER — OFFICE VISIT (OUTPATIENT)
Dept: PHYSICAL THERAPY | Facility: CLINIC | Age: 49
End: 2019-02-21
Payer: COMMERCIAL

## 2019-02-21 DIAGNOSIS — M54.41 CHRONIC BILATERAL LOW BACK PAIN WITH RIGHT-SIDED SCIATICA: Primary | ICD-10-CM

## 2019-02-21 DIAGNOSIS — G89.29 CHRONIC BILATERAL LOW BACK PAIN WITH RIGHT-SIDED SCIATICA: Primary | ICD-10-CM

## 2019-02-21 PROCEDURE — 97112 NEUROMUSCULAR REEDUCATION: CPT

## 2019-02-21 PROCEDURE — 97110 THERAPEUTIC EXERCISES: CPT

## 2019-02-21 PROCEDURE — 97140 MANUAL THERAPY 1/> REGIONS: CPT

## 2019-02-21 NOTE — PROGRESS NOTES
Daily Note     Today's date: 2019  Patient name: Jessika Tsang  : 1970  MRN: [id number]  Referring provider: Razia Leroy PA-C  Dx:   Encounter Diagnosis     ICD-10-CM    1  Chronic bilateral low back pain with right-sided sciatica M54 41     G89 29                   Subjective: Patient noted "a little bit of pain in my back "      Objective: See treatment diary below      Assessment: Tolerated treatment well  STM to lumbar paraspinals helped decrease fascia restrictions in lumbar paraspinals  Patient was able to perform exercises with no patient complaints of increased pain  Patient noted no back pain post treatment and feeling looser post MT  Updated and reviewed HEP with patient  Patient would benefit from continued PT  Plan: Continue per plan of care    Patient was told to monitor how she feels and to let therapist know NV          Precautions: anxiety, h/o DM, anemia, L ear hearing loss     Daily Treatment Diary         Assessment                    Kevin/Jennifer BETH                     FOTO 43                     POC Signed                       HEP Issued                        Manuals                       1) B Lumbar Paraspinals STM  2) B Iliopsoas Stretch  3)B Iliopsoas STM    B lumbar paraspinal STM                   Exercise Diary                        SKTC    30"x3                   PPT    10"x10                   PPT w/iso hip flexion    5"x10                   PPT w/iso hip add    5"x15                   PPT w/march    10xea                   PPT w/PBall Press 3 Way    10"x5                   Clamshells     3"x10                   Reverse Clamshells    5"x10                   3 Way Kneeling Stretch                       Seated PBall Lumbar Flexion                       Lifting Biomechanics                       TA Brace w/Mini Squat                       TA Brace w/ TB Chest Press                       Supine-Sit Transfer Training MD                     Sleep Education MD                                                                                                                     Modalities                       MHP L-spine  90/90 Position  Pre-Tx   10min pre

## 2019-02-22 RX ORDER — SODIUM CHLORIDE 9 MG/ML
20 INJECTION, SOLUTION INTRAVENOUS ONCE
Status: COMPLETED | OUTPATIENT
Start: 2019-02-25 | End: 2019-02-25

## 2019-02-22 RX ORDER — CYANOCOBALAMIN 1000 UG/ML
1000 INJECTION INTRAMUSCULAR; SUBCUTANEOUS ONCE
Status: COMPLETED | OUTPATIENT
Start: 2019-02-25 | End: 2019-02-25

## 2019-02-25 ENCOUNTER — HOSPITAL ENCOUNTER (OUTPATIENT)
Dept: INFUSION CENTER | Facility: HOSPITAL | Age: 49
Discharge: HOME | End: 2019-02-25
Payer: COMMERCIAL

## 2019-02-25 VITALS
SYSTOLIC BLOOD PRESSURE: 127 MMHG | DIASTOLIC BLOOD PRESSURE: 60 MMHG | HEART RATE: 65 BPM | TEMPERATURE: 97.6 F | RESPIRATION RATE: 18 BRPM

## 2019-02-25 PROCEDURE — 96372 THER/PROPH/DIAG INJ SC/IM: CPT

## 2019-02-25 PROCEDURE — 96374 THER/PROPH/DIAG INJ IV PUSH: CPT

## 2019-02-25 RX ADMIN — SODIUM CHLORIDE 20 ML/HR: 9 INJECTION, SOLUTION INTRAVENOUS at 14:20

## 2019-02-25 RX ADMIN — FERRIC CARBOXYMALTOSE INJECTION 750 MG: 50 INJECTION, SOLUTION INTRAVENOUS at 14:33

## 2019-02-25 RX ADMIN — CYANOCOBALAMIN 1000 MCG: 1000 INJECTION INTRAMUSCULAR; SUBCUTANEOUS at 14:37

## 2019-02-25 NOTE — PLAN OF CARE
Problem: Potential for Falls  Goal: Patient will remain free of falls  Description  INTERVENTIONS:  - Assess patient frequently for physical needs  -  Identify cognitive and physical deficits and behaviors that affect risk of falls    -  [place] fall precautions as indicated by assessment   - Educate patient/family on patient safety including physical limitations  - Instruct patient to call for assistance with activity based on assessment  - Modify environment to reduce risk of injury  - Consider OT/PT consult to assist with strengthening/mobility  Outcome: Progressing

## 2019-02-25 NOTE — PROGRESS NOTES
Pt here for #2/2 injectafer and Vit b12  Tolerated well without complications  Confirmed appt with Dr Ramos Speaker in May and AVS provided

## 2019-02-26 ENCOUNTER — OFFICE VISIT (OUTPATIENT)
Dept: PHYSICAL THERAPY | Facility: CLINIC | Age: 49
End: 2019-02-26
Payer: COMMERCIAL

## 2019-02-26 DIAGNOSIS — M54.41 CHRONIC BILATERAL LOW BACK PAIN WITH RIGHT-SIDED SCIATICA: Primary | ICD-10-CM

## 2019-02-26 DIAGNOSIS — G89.29 CHRONIC BILATERAL LOW BACK PAIN WITH RIGHT-SIDED SCIATICA: Primary | ICD-10-CM

## 2019-02-26 PROCEDURE — 97140 MANUAL THERAPY 1/> REGIONS: CPT

## 2019-02-26 PROCEDURE — 97110 THERAPEUTIC EXERCISES: CPT

## 2019-02-26 NOTE — PROGRESS NOTES
Daily Note     Today's date: 2019  Patient name: Maria Barnett  : 1970  MRN: [id number]  Referring provider: Kevin Cruz PA-C  Dx:   Encounter Diagnosis     ICD-10-CM    1  Chronic bilateral low back pain with right-sided sciatica M54 41     G89 29                   Subjective: Pt reports some soreness today but notes improved pain levels overall since IE  She says she feels "brand new" and has zero pain following manuals      Objective: See treatment diary below    Precautions: anxiety, h/o DM, anemia, L ear hearing loss     Daily Treatment Diary   Assessment                  Eval/Reval MD                     FOTO 82                     POC Signed                       HEP Issued                        Manuals                   1) B Lumbar Paraspinals STM  2) B Iliopsoas Stretch  3)B Iliopsoas STM    B lumbar paraspinal STM SH-all                  Exercise Diary                    SKTC    30"x3 30"x3 ea                 Piriformis stretch   30"x3 ea          PPT    10"x10 10"x10                 PPT w/iso hip flexion    5"x10 5"x10 ea                 PPT w/iso hip add    5"x15 5"x15                 PPT w/march    10xea 10x ea                 PPT w/PBall Press 3 Way    10"x5                   Clamshells     3"x10 5"x10                 Reverse Clamshells    5"x10 5"x10                 3 Way Kneeling Stretch                       Seated PBall Lumbar Flexion     10"x10                  Lifting Biomechanics                       TA Brace w/Mini Squat                       TA Brace w/ TB Chest Press                                             Sleep Education MD                     Supine-Sit Transfer Training  MD                                                                                             Modalities                  MHP L-spine  90/90 Position  Pre-Tx   10min pre  10' pre, supine TE                                       Assessment: Tolerated treatment well, including additions of piriformis stretch and pball flexion  Patient demonstrated fatigue post treatment, exhibited good technique with therapeutic exercises and would benefit from continued PT to improve core and lumbar stability and reduce lumbar PSM tightness  Plan: Continue per plan of care  Progress treatment as tolerated        Darrell Hays, PTA

## 2019-02-28 ENCOUNTER — APPOINTMENT (OUTPATIENT)
Dept: PHYSICAL THERAPY | Facility: CLINIC | Age: 49
End: 2019-02-28
Payer: COMMERCIAL

## 2019-02-28 DIAGNOSIS — G89.29 CHRONIC BILATERAL LOW BACK PAIN WITH RIGHT-SIDED SCIATICA: Primary | ICD-10-CM

## 2019-02-28 DIAGNOSIS — M54.41 CHRONIC BILATERAL LOW BACK PAIN WITH RIGHT-SIDED SCIATICA: Primary | ICD-10-CM

## 2019-03-04 ENCOUNTER — OFFICE VISIT (OUTPATIENT)
Dept: PHYSICAL THERAPY | Facility: CLINIC | Age: 49
End: 2019-03-04
Payer: COMMERCIAL

## 2019-03-04 DIAGNOSIS — G89.29 CHRONIC BILATERAL LOW BACK PAIN WITH RIGHT-SIDED SCIATICA: Primary | ICD-10-CM

## 2019-03-04 DIAGNOSIS — M54.41 CHRONIC BILATERAL LOW BACK PAIN WITH RIGHT-SIDED SCIATICA: Primary | ICD-10-CM

## 2019-03-04 PROCEDURE — 97110 THERAPEUTIC EXERCISES: CPT

## 2019-03-04 PROCEDURE — 97140 MANUAL THERAPY 1/> REGIONS: CPT

## 2019-03-04 NOTE — PROGRESS NOTES
Daily Note     Today's date: 3/4/2019  Patient name: Elif Portillo  : 1970  MRN: [id number]  Referring provider: Burt Uribe PA-C  Dx:   Encounter Diagnosis     ICD-10-CM    1  Chronic bilateral low back pain with right-sided sciatica M54 41     G89 29                   Subjective: Pt reports increase in low back pain due to changes in weather and onset of menstruation as well as helping to push car out of snow yesterday  She reports that she has been compliant with HEP and that "it helps"      Objective: See treatment diary below    Precautions: anxiety, h/o DM, anemia, L ear hearing loss     Daily Treatment Diary   Assessment 2/13  2/21  2/26 3/4                Eval/Jennifer BETH                     FOTO 80                     POC Signed                       HEP Issued                        Manuals  2/13 2/21 2/26  3/4               1) B Lumbar Paraspinals STM  2) B Iliopsoas Stretch  3)B Iliopsoas STM    B lumbar paraspinal STM SH-all  SH-1,2                Exercise Diary   2/13  2/21  2/26 3/4                SKTC    30"x3 30"x3 ea 30"x3 ea               Piriformis stretch   30"x3 ea 30"x3 ea         PPT    10"x10 10"x10 10"x10               PPT w/iso hip flexion    5"x10 5"x10 ea 5"x10 ea               PPT w/iso hip add    5"x15 5"x15 5"x20               PPT w/march    10xea 10x ea 15x ea               PPT w/PBall Press 3 Way    10"x5                  Clamshells     3"x10 5"x10 5"x15               Reverse Clamshells    5"x10 5"x10 5"x15               3 Way Kneeling Stretch       10"x5 ea               Seated PBall Lumbar Flexion     10"x10  10"x10               Lifting Biomechanics                      TA Brace w/Mini Squat                      TA Brace w/ TB Chest Press                                           Sleep Education MD                   Supine-Sit Transfer Training  MD                                                                                           Modalities   3               MHP L-spine  90/90 Position  Pre-Tx   10min pre  10' pre, supine TE  10' pre, supine TE                                         Assessment: Tolerated treatment well  Patient demonstrated fatigue post treatment, exhibited good technique with therapeutic exercises and would benefit from continued PT to improve core stability and strength as well as reduce lumbar pain  Plan: Continue per plan of care  Progress treatment as tolerated        Theresa Cordova, PTA

## 2019-03-07 ENCOUNTER — APPOINTMENT (OUTPATIENT)
Dept: PHYSICAL THERAPY | Facility: CLINIC | Age: 49
End: 2019-03-07
Payer: COMMERCIAL

## 2019-03-07 ENCOUNTER — OFFICE VISIT (OUTPATIENT)
Dept: OTOLARYNGOLOGY | Facility: CLINIC | Age: 49
End: 2019-03-07
Payer: COMMERCIAL

## 2019-03-07 VITALS
BODY MASS INDEX: 30.84 KG/M2 | HEART RATE: 70 BPM | RESPIRATION RATE: 17 BRPM | HEIGHT: 62 IN | SYSTOLIC BLOOD PRESSURE: 116 MMHG | DIASTOLIC BLOOD PRESSURE: 75 MMHG | WEIGHT: 167.6 LBS

## 2019-03-07 DIAGNOSIS — H61.22 IMPACTED CERUMEN OF LEFT EAR: ICD-10-CM

## 2019-03-07 DIAGNOSIS — H90.12 CONDUCTIVE HEARING LOSS OF LEFT EAR WITH UNRESTRICTED HEARING OF RIGHT EAR: ICD-10-CM

## 2019-03-07 DIAGNOSIS — H72.92 PERFORATION OF LEFT TYMPANIC MEMBRANE: ICD-10-CM

## 2019-03-07 DIAGNOSIS — J35.1 PALATINE TONSIL HYPERTROPHY: Primary | ICD-10-CM

## 2019-03-07 DIAGNOSIS — J35.8 TONSILLITH: ICD-10-CM

## 2019-03-07 PROCEDURE — 99205 OFFICE O/P NEW HI 60 MIN: CPT | Performed by: SPECIALIST

## 2019-03-07 PROCEDURE — 92557 COMPREHENSIVE HEARING TEST: CPT | Performed by: SPECIALIST

## 2019-03-07 RX ORDER — PSYLLIUM HUSK 3.4 G/7G
POWDER ORAL DAILY
Refills: 0 | COMMUNITY
Start: 2019-03-05

## 2019-03-07 NOTE — PROGRESS NOTES
Otolaryngology Head and Neck Surgery History and Physical    Chief complaint    Chief Complaint   Patient presents with    Sore Throat     enlarged tonsils    left-sided hearing loss    History of the Present Illness    Saloni Covarrubias is a 50 y o  who presents with chronic issues with hypertrophic palatine tonsils  Patient reports that she has difficulty swallowing because the size of her tonsils  She reports that she is chronically snoring and having dry irritated throat  She also complains of having significant debris in the tonsils which has been going on for a years  She gets an occasional infection but this has not been a major problem  Patient also reports that she has had a decreased hearing in the left ear for many years  She reports never having had a hearing test   She reported that when she was pregnant many years ago she had some bleeding from her left ear  She reports that she has had some intermittent drainage but not for some time  No complaints of any pain, previous ear surgery, tinnitus or vertigo  Review of Systems   Constitutional: Positive for fatigue  HENT: Positive for dental problem, sinus pressure, sinus pain and sore throat  Eyes: Negative  Respiratory: Positive for chest tightness and shortness of breath  Cardiovascular: Negative  Gastrointestinal: Positive for constipation and nausea  Endocrine: Negative  Genitourinary: Negative  Musculoskeletal: Positive for back pain  Skin: Negative  Allergic/Immunologic: Negative  Neurological: Positive for light-headedness and headaches  Hematological: Negative  Psychiatric/Behavioral: Negative          Past Medical History:   Diagnosis Date    Back pain     Dental disease     Diabetes ([address] )     Ear problems     Fatigue     Iron deficiency anemia     Nasal congestion     Tonsillitis     Vitamin B12 deficiency        Past Surgical History:   Procedure Laterality Date     SECTION  TUBAL LIGATION         Social History     Socioeconomic History    Marital status: Single     Spouse name: Not on file    Number of children: Not on file    Years of education: Not on file    Highest education level: Not on file   Occupational History    Not on file   Social Needs    Financial resource strain: Not on file    Food insecurity:     Worry: Not on file     Inability: Not on file    Transportation needs:     Medical: Not on file     Non-medical: Not on file   Tobacco Use    Smoking status: Former Smoker    Smokeless tobacco: Never Used   Substance and Sexual Activity    Alcohol use: No     Comment: social    Drug use: No    Sexual activity: Not on file     Comment: Birth control not practiced- High sexual behavior   Lifestyle    Physical activity:     Days per week: Not on file     Minutes per session: Not on file    Stress: Not on file   Relationships    Social connections:     Talks on phone: Not on file     Gets together: Not on file     Attends [employer] service: Not on file     Active member of club or organization: Not on file     Attends meetings of clubs or organizations: Not on file     Relationship status: Not on file    Intimate partner violence:     Fear of current or ex partner: Not on file     Emotionally abused: Not on file     Physically abused: Not on file     Forced sexual activity: Not on file   Other Topics Concern    Not on file   Social History Narrative    Caffeine use    Uses safety equipment seatbelt       Family History   Problem Relation Age of Onset    Diabetes Mother     Cancer Mother     Stroke Mother     Hypertension Mother     Lung cancer Mother     Hyperlipidemia Family            /75 (BP Location: Right arm, Patient Position: Sitting, Cuff Size: Standard)   Pulse 70   Resp 17   Ht 5' 2" (1 575 m)   Wt 76 kg (167 lb 9 6 oz)   BMI 30 65 kg/m²       Current Outpatient Medications:     cyanocobalamin (VITAMIN B-12) 1,000 mcg tablet, Take 1 tablet (1,000 mcg total) by mouth daily, Disp: 90 tablet, Rfl: 1    cyclobenzaprine (FLEXERIL) 5 mg tablet, Take 1 tablet (5 mg total) by mouth daily at bedtime, Disp: 30 tablet, Rfl: 3    ergocalciferol (VITAMIN D2) 50,000 units, Take 1 capsule (50,000 Units total) by mouth once a week, Disp: 4 capsule, Rfl: 5    ferrous sulfate 325 (65 Fe) mg tablet, Take 1 tablet (325 mg total) by mouth 3 (three) times a day with meals Take with vitamin c, Disp: 60 tablet, Rfl: 5    meloxicam (MOBIC) 7 5 mg tablet, Take 1 tablet (7 5 mg total) by mouth daily, Disp: 30 tablet, Rfl: 1    RA VITAMIN B-12 TR 1000 MCG TBCR, , Disp: , Rfl: 0     Physical Exam   Constitutional: She is oriented to person, place, and time  She appears well-developed and well-nourished  HENT:   Head: Normocephalic and atraumatic  Right Ear: External ear normal  No drainage, swelling or tenderness  No mastoid tenderness  Tympanic membrane is not injected and not perforated  Tympanic membrane mobility is normal  No middle ear effusion  Left Ear: External ear normal  No drainage, swelling or tenderness  No mastoid tenderness  Tympanic membrane is not injected and not perforated  Tympanic membrane mobility is normal   No middle ear effusion  Ears:    Nose: Nose normal  No mucosal edema, rhinorrhea, sinus tenderness, nasal deformity or septal deviation  Right sinus exhibits no maxillary sinus tenderness and no frontal sinus tenderness  Left sinus exhibits no maxillary sinus tenderness and no frontal sinus tenderness  Mouth/Throat: Uvula is midline and oropharynx is clear and moist  Mucous membranes are not pale and not dry  No oral lesions  Normal dentition  Eyes: Pupils are equal, round, and reactive to light  Conjunctivae and EOM are normal    Neck: Normal range of motion  Neck supple  No JVD present  No tracheal deviation present  No thyromegaly present     Cardiovascular:   Carotid normal   Jugular no distension   Pulmonary/Chest: No respiratory distress  Abdominal: Soft  She exhibits no distension  Musculoskeletal: Normal range of motion  Lymphadenopathy:     She has no cervical adenopathy  Neurological: She is alert and oriented to person, place, and time  No cranial nerve deficit  Skin: Skin is warm  No rash noted  No erythema  Psychiatric: She has a normal mood and affect  Her behavior is normal  Thought content normal          Procedure:      Imaging studies:      Pertinent laboratory data:      Assessment and plan:    1  [place] tonsil hypertrophy     2  Tonsillith     3  Perforation of left tympanic membrane  Ambulatory referral to Audiology   4  Conductive hearing loss of left ear with unrestricted hearing of right ear     5  Impacted cerumen of left ear         Patient with chronic hypertrophy of the tonsils causing airway obstruction, swallowing issues and chronic debris  We discussed that with her issues that certainly would be reasonable to consider tonsillectomy  We discussed the procedure including the risks of general anesthesia, 4% postop bleeding and fatal hemorrhage  At this time patient would like to proceed  Patient however is being worked up for anemia  I told her we would need clearance from Dr Robin Rodrigues prior to doing any surgery  Patient with perforation left tympanic membrane in the pars flaccid area  Audiometric testing showed maximal conductive defect  She may have ossicular erosion most likely this incus  This time I recommend that we get a CT scan of the temporal bone so we can decide on further treatment  She most likely would need a tympanomastoidectomy

## 2019-03-11 ENCOUNTER — OFFICE VISIT (OUTPATIENT)
Dept: PHYSICAL THERAPY | Facility: CLINIC | Age: 49
End: 2019-03-11
Payer: COMMERCIAL

## 2019-03-11 DIAGNOSIS — M54.41 CHRONIC BILATERAL LOW BACK PAIN WITH RIGHT-SIDED SCIATICA: Primary | ICD-10-CM

## 2019-03-11 DIAGNOSIS — G89.29 CHRONIC BILATERAL LOW BACK PAIN WITH RIGHT-SIDED SCIATICA: Primary | ICD-10-CM

## 2019-03-11 PROCEDURE — 97110 THERAPEUTIC EXERCISES: CPT

## 2019-03-11 NOTE — PROGRESS NOTES
Daily Note     Today's date: 3/11/2019  Patient name: Timoteo Montalvo  : 1970  MRN: [id number]  Referring provider: Annalise Velez PA-C  Dx:   Encounter Diagnosis     ICD-10-CM    1  Chronic bilateral low back pain with right-sided sciatica M54 41     G89 29                   Subjective: Pt reports that her back has been feeling the same but notes that she has needed her prescription pain medication less frequently      Objective: See treatment diary below    Precautions: anxiety, h/o DM, anemia, L ear hearing loss     Daily Treatment Diary   Assessment 2/13  2/21  2/26 3/4  3/11              Eval/Reval MD                     FOTO 80       74              POC Signed                       HEP Issued                        Manuals  2/13 2/21 2/26  3/4 3/11              1) B Lumbar Paraspinals STM  2) B Iliopsoas Stretch  3)B Iliopsoas STM    B lumbar paraspinal STM SH-all  SH-1,2  SH-1              Exercise Diary   2/13  2/21  2/26 3/4  3/11              SKTC    30"x3 30"x3 ea 30"x3 ea 30"x3 ea             Piriformis stretch   30"x3 ea 30"x3 ea 30"x3 ea        PPT    10"x10 10"x10 10"x10 -              PPT w/iso hip flexion    5"x10 5"x10 ea 5"x10 ea  5"x10 ea             PPT w/iso hip add    5"x15 5"x15 5"x20 -              PPT w/march    10xea 10x ea 15x ea 3x5 ea             PPT w/PBall Press 3 Way    10"x5    -              Clamshells     3"x10 5"x10 5"x15 5"x20 ea             Reverse Clamshells    5"x10 5"x10 5"x15 5"x20 ea             3 Way Kneeling Stretch       10"x5 ea  10"x5 ea             Seated PBall Lumbar Flexion     10"x10  10"x10  -             Lifting Biomechanics                      TA Brace w/Mini Squat                      TA Brace w/ TB Chest Press                                           Sleep Education MD                   Supine-Sit Transfer Training  MD                                                                                           Modalities 2/13  2/21  2/26  3/4 3/11             MHP L-spine  90/90 Position  Pre-Tx   10min pre  10' pre, supine TE  10' pre, supine TE  10' pre, supine TE                                       Assessment: Tolerated treatment well  Patient exhibited good technique with therapeutic exercises and would benefit from continued PT to improve core stability and ambulation endurance  Plan: Continue per plan of care  Progress treatment as tolerated        Ilene Ohm, PTA

## 2019-03-14 ENCOUNTER — APPOINTMENT (OUTPATIENT)
Dept: PHYSICAL THERAPY | Facility: CLINIC | Age: 49
End: 2019-03-14
Payer: COMMERCIAL

## 2019-03-15 ENCOUNTER — HOSPITAL ENCOUNTER (OUTPATIENT)
Dept: CT IMAGING | Facility: HOSPITAL | Age: 49
Discharge: HOME | End: 2019-03-15
Attending: SPECIALIST
Payer: COMMERCIAL

## 2019-03-15 DIAGNOSIS — H90.12 CONDUCTIVE HEARING LOSS OF LEFT EAR WITH UNRESTRICTED HEARING OF RIGHT EAR: ICD-10-CM

## 2019-03-15 PROCEDURE — 70480 CT ORBIT/EAR/FOSSA W/O DYE: CPT

## 2019-03-28 ENCOUNTER — APPOINTMENT (OUTPATIENT)
Dept: PHYSICAL THERAPY | Facility: CLINIC | Age: 49
End: 2019-03-28
Payer: COMMERCIAL

## 2019-04-05 ENCOUNTER — EVALUATION (OUTPATIENT)
Dept: PHYSICAL THERAPY | Facility: CLINIC | Age: 49
End: 2019-04-05
Payer: COMMERCIAL

## 2019-04-05 DIAGNOSIS — M54.41 CHRONIC BILATERAL LOW BACK PAIN WITH RIGHT-SIDED SCIATICA: Primary | ICD-10-CM

## 2019-04-05 DIAGNOSIS — G89.29 CHRONIC BILATERAL LOW BACK PAIN WITH RIGHT-SIDED SCIATICA: Primary | ICD-10-CM

## 2019-04-05 PROCEDURE — 97164 PT RE-EVAL EST PLAN CARE: CPT | Performed by: PHYSICAL THERAPIST

## 2019-04-10 ENCOUNTER — APPOINTMENT (OUTPATIENT)
Dept: PHYSICAL THERAPY | Facility: CLINIC | Age: 49
End: 2019-04-10
Payer: COMMERCIAL

## 2019-04-15 ENCOUNTER — APPOINTMENT (OUTPATIENT)
Dept: PHYSICAL THERAPY | Facility: CLINIC | Age: 49
End: 2019-04-15
Payer: COMMERCIAL

## 2019-04-17 ENCOUNTER — APPOINTMENT (OUTPATIENT)
Dept: PHYSICAL THERAPY | Facility: CLINIC | Age: 49
End: 2019-04-17
Payer: COMMERCIAL

## 2019-04-22 ENCOUNTER — APPOINTMENT (OUTPATIENT)
Dept: PHYSICAL THERAPY | Facility: CLINIC | Age: 49
End: 2019-04-22
Payer: COMMERCIAL

## 2019-04-24 ENCOUNTER — APPOINTMENT (OUTPATIENT)
Dept: PHYSICAL THERAPY | Facility: CLINIC | Age: 49
End: 2019-04-24
Payer: COMMERCIAL

## 2019-04-29 ENCOUNTER — APPOINTMENT (OUTPATIENT)
Dept: PHYSICAL THERAPY | Facility: CLINIC | Age: 49
End: 2019-04-29
Payer: COMMERCIAL

## 2019-05-09 ENCOUNTER — TRANSCRIBE ORDERS (OUTPATIENT)
Dept: ADMINISTRATIVE | Facility: HOSPITAL | Age: 49
End: 2019-05-09

## 2019-05-09 ENCOUNTER — APPOINTMENT (OUTPATIENT)
Dept: LAB | Facility: HOSPITAL | Age: 49
End: 2019-05-09
Attending: INTERNAL MEDICINE
Payer: COMMERCIAL

## 2019-05-09 DIAGNOSIS — D50.9 IRON DEFICIENCY ANEMIA, UNSPECIFIED IRON DEFICIENCY ANEMIA TYPE: ICD-10-CM

## 2019-05-09 DIAGNOSIS — D50.9 IRON DEFICIENCY ANEMIA, UNSPECIFIED IRON DEFICIENCY ANEMIA TYPE: Primary | ICD-10-CM

## 2019-05-09 LAB
ALBUMIN SERPL BCP-MCNC: 4.3 G/DL (ref 3–5.2)
ALP SERPL-CCNC: 48 U/L (ref 43–122)
ALT SERPL W P-5'-P-CCNC: 17 U/L (ref 9–52)
ANION GAP SERPL CALCULATED.3IONS-SCNC: 9 MMOL/L (ref 5–14)
AST SERPL W P-5'-P-CCNC: 20 U/L (ref 14–36)
BASOPHILS # BLD AUTO: 0 THOUSANDS/ΜL (ref 0–0.1)
BASOPHILS NFR BLD AUTO: 1 % (ref 0–1)
BILIRUB SERPL-MCNC: 0.4 MG/DL
BUN SERPL-MCNC: 16 MG/DL (ref 5–25)
CALCIUM SERPL-MCNC: 9.5 MG/DL (ref 8.4–10.2)
CHLORIDE SERPL-SCNC: 103 MMOL/L (ref 97–108)
CO2 SERPL-SCNC: 26 MMOL/L (ref 22–30)
CREAT SERPL-MCNC: 0.64 MG/DL (ref 0.6–1.2)
EOSINOPHIL # BLD AUTO: 0.2 THOUSAND/ΜL (ref 0–0.4)
EOSINOPHIL NFR BLD AUTO: 3 % (ref 0–6)
ERYTHROCYTE [DISTWIDTH] IN BLOOD BY AUTOMATED COUNT: 22.3 %
FERRITIN SERPL-MCNC: 83 NG/ML (ref 8–388)
GFR SERPL CREATININE-BSD FRML MDRD: 106 ML/MIN/1.73SQ M
GLUCOSE P FAST SERPL-MCNC: 95 MG/DL (ref 70–99)
HCT VFR BLD AUTO: 36 % (ref 36–46)
HGB BLD-MCNC: 12 G/DL (ref 12–16)
IRON SERPL-MCNC: 50 UG/DL (ref 50–170)
LYMPHOCYTES # BLD AUTO: 1.8 THOUSANDS/ΜL (ref 0.5–4)
LYMPHOCYTES NFR BLD AUTO: 34 % (ref 25–45)
MCH RBC QN AUTO: 31.1 PG (ref 26–34)
MCHC RBC AUTO-ENTMCNC: 33.2 G/DL (ref 31–36)
MCV RBC AUTO: 94 FL (ref 80–100)
MONOCYTES # BLD AUTO: 0.5 THOUSAND/ΜL (ref 0.2–0.9)
MONOCYTES NFR BLD AUTO: 10 % (ref 1–10)
NEUTROPHILS # BLD AUTO: 2.8 THOUSANDS/ΜL (ref 1.8–7.8)
NEUTS SEG NFR BLD AUTO: 52 % (ref 45–65)
PLATELET # BLD AUTO: 301 THOUSANDS/UL (ref 150–450)
PLATELET BLD QL SMEAR: ADEQUATE
PMV BLD AUTO: 9.1 FL (ref 8.9–12.7)
POTASSIUM SERPL-SCNC: 4.1 MMOL/L (ref 3.6–5)
PROT SERPL-MCNC: 7.3 G/DL (ref 5.9–8.4)
RBC # BLD AUTO: 3.84 MILLION/UL (ref 4–5.2)
RBC MORPH BLD: NORMAL
SODIUM SERPL-SCNC: 138 MMOL/L (ref 137–147)
TIBC SERPL-MCNC: 347 UG/DL (ref 250–450)
VIT B12 SERPL-MCNC: 380 PG/ML (ref 100–900)
WBC # BLD AUTO: 5.4 THOUSAND/UL (ref 4.5–11)

## 2019-05-09 PROCEDURE — 80053 COMPREHEN METABOLIC PANEL: CPT | Performed by: INTERNAL MEDICINE

## 2019-05-09 PROCEDURE — 83540 ASSAY OF IRON: CPT

## 2019-05-09 PROCEDURE — 82728 ASSAY OF FERRITIN: CPT

## 2019-05-09 PROCEDURE — 36415 COLL VENOUS BLD VENIPUNCTURE: CPT | Performed by: INTERNAL MEDICINE

## 2019-05-09 PROCEDURE — 82607 VITAMIN B-12: CPT | Performed by: INTERNAL MEDICINE

## 2019-05-09 PROCEDURE — 85025 COMPLETE CBC W/AUTO DIFF WBC: CPT | Performed by: INTERNAL MEDICINE

## 2019-05-09 PROCEDURE — 83550 IRON BINDING TEST: CPT

## 2019-05-20 ENCOUNTER — TELEPHONE (OUTPATIENT)
Dept: HEMATOLOGY ONCOLOGY | Facility: CLINIC | Age: 49
End: 2019-05-20

## 2019-05-20 ENCOUNTER — OFFICE VISIT (OUTPATIENT)
Dept: HEMATOLOGY ONCOLOGY | Facility: CLINIC | Age: 49
End: 2019-05-20
Payer: COMMERCIAL

## 2019-05-20 VITALS
HEIGHT: 62 IN | OXYGEN SATURATION: 98 % | DIASTOLIC BLOOD PRESSURE: 80 MMHG | WEIGHT: 167.8 LBS | SYSTOLIC BLOOD PRESSURE: 124 MMHG | TEMPERATURE: 98 F | RESPIRATION RATE: 16 BRPM | BODY MASS INDEX: 30.88 KG/M2 | HEART RATE: 71 BPM

## 2019-05-20 DIAGNOSIS — D50.9 IRON DEFICIENCY ANEMIA, UNSPECIFIED IRON DEFICIENCY ANEMIA TYPE: Primary | ICD-10-CM

## 2019-05-20 DIAGNOSIS — E53.8 B12 DEFICIENCY: ICD-10-CM

## 2019-05-20 PROCEDURE — 99213 OFFICE O/P EST LOW 20 MIN: CPT | Performed by: NURSE PRACTITIONER

## 2019-05-20 RX ORDER — CYANOCOBALAMIN 1000 UG/ML
1000 INJECTION INTRAMUSCULAR; SUBCUTANEOUS ONCE
Status: CANCELLED | OUTPATIENT
Start: 2019-05-27

## 2019-05-30 ENCOUNTER — HOSPITAL ENCOUNTER (OUTPATIENT)
Dept: INFUSION CENTER | Facility: HOSPITAL | Age: 49
Discharge: HOME | End: 2019-05-30
Attending: INTERNAL MEDICINE
Payer: COMMERCIAL

## 2019-05-30 ENCOUNTER — OFFICE VISIT (OUTPATIENT)
Dept: OTOLARYNGOLOGY | Facility: CLINIC | Age: 49
End: 2019-05-30
Payer: COMMERCIAL

## 2019-05-30 VITALS
DIASTOLIC BLOOD PRESSURE: 77 MMHG | BODY MASS INDEX: 30.73 KG/M2 | HEIGHT: 62 IN | SYSTOLIC BLOOD PRESSURE: 122 MMHG | WEIGHT: 167 LBS | HEART RATE: 72 BPM

## 2019-05-30 DIAGNOSIS — H72.92 PERFORATION OF LEFT TYMPANIC MEMBRANE: ICD-10-CM

## 2019-05-30 DIAGNOSIS — E53.8 B12 DEFICIENCY: Primary | ICD-10-CM

## 2019-05-30 DIAGNOSIS — H90.12 CONDUCTIVE HEARING LOSS OF LEFT EAR WITH UNRESTRICTED HEARING OF RIGHT EAR: Primary | ICD-10-CM

## 2019-05-30 PROCEDURE — 96372 THER/PROPH/DIAG INJ SC/IM: CPT

## 2019-05-30 PROCEDURE — 99214 OFFICE O/P EST MOD 30 MIN: CPT | Performed by: SPECIALIST

## 2019-05-30 RX ORDER — CYANOCOBALAMIN 1000 UG/ML
1000 INJECTION INTRAMUSCULAR; SUBCUTANEOUS ONCE
Status: COMPLETED | OUTPATIENT
Start: 2019-05-30 | End: 2019-05-30

## 2019-05-30 RX ORDER — CYANOCOBALAMIN 1000 UG/ML
1000 INJECTION INTRAMUSCULAR; SUBCUTANEOUS ONCE
Status: CANCELLED | OUTPATIENT
Start: 2019-06-27

## 2019-05-30 RX ADMIN — CYANOCOBALAMIN 1000 MCG: 1000 INJECTION INTRAMUSCULAR; SUBCUTANEOUS at 13:13

## 2019-06-05 ENCOUNTER — TELEPHONE (OUTPATIENT)
Dept: OTOLARYNGOLOGY | Facility: CLINIC | Age: 49
End: 2019-06-05

## 2019-06-10 PROBLEM — H90.12 CONDUCTIVE HEARING LOSS OF LEFT EAR WITH UNRESTRICTED HEARING OF RIGHT EAR: Status: ACTIVE | Noted: 2019-06-10

## 2019-06-10 PROBLEM — H72.92 PERFORATION OF LEFT TYMPANIC MEMBRANE: Status: ACTIVE | Noted: 2019-06-10

## 2019-06-13 ENCOUNTER — TELEPHONE (OUTPATIENT)
Dept: OTOLARYNGOLOGY | Facility: CLINIC | Age: 49
End: 2019-06-13

## 2019-06-27 ENCOUNTER — OFFICE VISIT (OUTPATIENT)
Dept: OTOLARYNGOLOGY | Facility: CLINIC | Age: 49
End: 2019-06-27
Payer: COMMERCIAL

## 2019-06-27 VITALS
BODY MASS INDEX: 30.33 KG/M2 | DIASTOLIC BLOOD PRESSURE: 76 MMHG | HEIGHT: 62 IN | SYSTOLIC BLOOD PRESSURE: 122 MMHG | HEART RATE: 70 BPM | WEIGHT: 164.8 LBS | RESPIRATION RATE: 18 BRPM

## 2019-06-27 DIAGNOSIS — H72.92 PERFORATION OF LEFT TYMPANIC MEMBRANE: ICD-10-CM

## 2019-06-27 DIAGNOSIS — H90.12 CONDUCTIVE HEARING LOSS OF LEFT EAR WITH UNRESTRICTED HEARING OF RIGHT EAR: ICD-10-CM

## 2019-06-27 DIAGNOSIS — H74.20 OSSICULAR CHAIN DISRUPTION: ICD-10-CM

## 2019-06-27 DIAGNOSIS — J35.8 TONSILLITH: ICD-10-CM

## 2019-06-27 DIAGNOSIS — J35.1 PALATINE TONSIL HYPERTROPHY: Primary | ICD-10-CM

## 2019-06-27 DIAGNOSIS — Z01.818 PRE-OP EXAM: ICD-10-CM

## 2019-06-27 PROCEDURE — 99214 OFFICE O/P EST MOD 30 MIN: CPT | Performed by: OTOLARYNGOLOGY

## 2019-06-27 NOTE — H&P (VIEW-ONLY)
Otolaryngology Head and Neck Surgery History and Physical    Chief complaint    Chief Complaint   Patient presents with    new H&P for surgery on 2019        History of the Present Illness    Sherri Guevara is a 50 y o  who presents with chronic tonsillitis/chronic hypertrophy of tonsils  She does have history of sleep apnea and severe snoring, she did have sleep study but never was able to use the CPAP  In addition she does have a chronic left tympanic membrane perforation since age 25, in a prior visit with Dr Isidoro Celestin, audiometry revealed a maximal conductive hearing loss with an air bone gap and excess of 40-50 decibel  CT scan temporal bone also highly suggestive of a ossicular chain disruption  In this regard she has been referred to Dr Vern Kay at [facility]  Review of Systems   Constitutional: Negative  HENT: Positive for sore throat  Eyes: Negative  Respiratory: Negative  Cardiovascular: Negative  Gastrointestinal: Negative  Endocrine: Negative  Genitourinary: Negative  Musculoskeletal: Negative  Skin: Negative  Allergic/Immunologic: Negative  Neurological: Negative  Hematological: Negative  Psychiatric/Behavioral: Negative          Past Medical History:   Diagnosis Date    Back pain     Dental disease     Diabetes ([address] )     Ear problems     Fatigue     Iron deficiency anemia     Nasal congestion     Tonsillitis     Vitamin B12 deficiency        Past Surgical History:   Procedure Laterality Date     SECTION      TUBAL LIGATION         Social History     Socioeconomic History    Marital status: Single     Spouse name: Not on file    Number of children: Not on file    Years of education: Not on file    Highest education level: Not on file   Occupational History    Not on file   Social Needs    Financial resource strain: Not on file    Food insecurity:     Worry: Not on file     Inability: Not on file   [address] needs:      Medical: Not on file     Non-medical: Not on file   Tobacco Use    Smoking status: Former Smoker    Smokeless tobacco: Never Used   Substance and Sexual Activity    Alcohol use: No     Comment: social    Drug use: No    Sexual activity: Not on file     Comment: Birth control not practiced- High sexual behavior   Lifestyle    Physical activity:     Days per week: Not on file     Minutes per session: Not on file    Stress: Not on file   Relationships    Social connections:     Talks on phone: Not on file     Gets together: Not on file     Attends [employer]regation service: Not on file     Active member of club or organization: Not on file     Attends meetings of clubs or organizations: Not on file     Relationship status: Not on file    Intimate partner violence:     Fear of current or ex partner: Not on file     Emotionally abused: Not on file     Physically abused: Not on file     Forced sexual activity: Not on file   Other Topics Concern    Not on file   Social History Narrative    Caffeine use    Uses safety equipment seatbelt       Family History   Problem Relation Age of Onset    Diabetes Mother     Cancer Mother     Stroke Mother     Hypertension Mother     Lung cancer Mother     Hyperlipidemia Family            /76 (BP Location: Right arm, Patient Position: Sitting, Cuff Size: Standard)   Pulse 70   Resp 18   Ht 5' 2" (1 575 m)   Wt 74 8 kg (164 lb 12 8 oz)   BMI 30 14 kg/m²       Current Outpatient Medications:     cyanocobalamin (VITAMIN B-12) 1,000 mcg tablet, Take 1 tablet (1,000 mcg total) by mouth daily, Disp: 90 tablet, Rfl: 1    cyclobenzaprine (FLEXERIL) 5 mg tablet, Take 1 tablet (5 mg total) by mouth daily at bedtime, Disp: 30 tablet, Rfl: 3    ergocalciferol (VITAMIN D2) 50,000 units, Take 1 capsule (50,000 Units total) by mouth once a week, Disp: 4 capsule, Rfl: 5    meloxicam (MOBIC) 7 5 mg tablet, Take 1 tablet (7 5 mg total) by mouth daily, Disp: 30 tablet, Rfl: 1    RA VITAMIN B-12 TR 1000 MCG TBCR, , Disp: , Rfl: 0     Physical Exam    Physical Exam   Constitutional: Oriented to person, place, and time  Well-developed and well-nourished, no apparent distress, non-toxic appearance  Cooperative, able to hear and answer questions without difficulty  Voice: Normal voice quality  Head: Normocephalic, atraumatic  No scars, masses or lesions  Face: Symmetric, no edema, no sinus tenderness  Eyes: Vision grossly intact, extra-ocular movement intact  Right Ear: External ear normal   Auditory canal clear  Tympanic membrane well-appearing, without retraction or scarring  No fluid present  No post-auricular erythema or tenderness  Left Ear: External ear normal   Auditory canal clear  Tympanic membrane with postero superior perforation, no incudostapedial joint visualized  No active discharge or cholesteatoma type debris noted  No post-auricular erythema or tenderness  Nose: Septum midline, nares clear  Mucosa moist, turbinates well appearing  No crusting, polyps or discharge evident  Oral cavity: Dentition intact  Mucosa moist, lips normal   Tongue mobile, floor of mouth normal   Hard palate unremarkable  No masses or lesions  Oropharynx: Uvula is midline, sot palate normal   Unremarkable oropharyngeal inlet  Tonsils 4+ , very prominent superior pole  Posterior pharyngeal wall clear  No masses or lesions  Salivary glands:  Parotid glands and submandibular glands symmetric, no enlargement or tenderness  Neck: Normal laryngeal elevation with swallow  Trachea midline  No masses or lesions  No palpable adenopathy  Thyroid: normal in size, unremarkable without tenderness or palpable nodules  Pulmonary/Chest: Normal effort and rate  No respiratory distress  Lungs CTAB, [place] S1 S2 RRR  Musculoskeletal: Normal range of motion  Neurological: Cranial nerves 2-12 intact  Skin: Skin is warm and dry  Psychiatric: Normal mood and affect      Reviewed prior visit audiometry, as described in H&P        Assessment and plan:    1  Tonsillith     2  [place] tonsil hypertrophy     3  Perforation of left tympanic membrane     4  Conductive hearing loss of left ear with unrestricted hearing of right ear     5  Ossicular chain disruption       Will proceed with tonsillectomy and adenoidectomy, also discussed possibility of needing uvulectomy  This I will decide in the surgery  Risks benefits sentences were reviewed  Informed consent was obtained

## 2019-07-03 ENCOUNTER — APPOINTMENT (OUTPATIENT)
Dept: LAB | Facility: HOSPITAL | Age: 49
End: 2019-07-03
Attending: OTOLARYNGOLOGY
Payer: COMMERCIAL

## 2019-07-03 ENCOUNTER — TRANSCRIBE ORDERS (OUTPATIENT)
Dept: ADMINISTRATIVE | Facility: HOSPITAL | Age: 49
End: 2019-07-03

## 2019-07-03 ENCOUNTER — HOSPITAL ENCOUNTER (OUTPATIENT)
Dept: NON INVASIVE DIAGNOSTICS | Facility: HOSPITAL | Age: 49
Discharge: HOME | End: 2019-07-03
Attending: OTOLARYNGOLOGY
Payer: COMMERCIAL

## 2019-07-03 DIAGNOSIS — H74.20 OSSICULAR CHAIN DISRUPTION: ICD-10-CM

## 2019-07-03 DIAGNOSIS — Z01.818 PRE-OP EXAM: ICD-10-CM

## 2019-07-03 DIAGNOSIS — J35.1 PALATINE TONSIL HYPERTROPHY: ICD-10-CM

## 2019-07-03 DIAGNOSIS — J35.8 TONSILLITH: ICD-10-CM

## 2019-07-03 DIAGNOSIS — H72.92 PERFORATION OF LEFT TYMPANIC MEMBRANE: ICD-10-CM

## 2019-07-03 DIAGNOSIS — H90.12 CONDUCTIVE HEARING LOSS OF LEFT EAR WITH UNRESTRICTED HEARING OF RIGHT EAR: ICD-10-CM

## 2019-07-03 LAB
ANION GAP SERPL CALCULATED.3IONS-SCNC: 9 MMOL/L (ref 5–14)
ATRIAL RATE: 60 BPM
BASOPHILS # BLD AUTO: 0 THOUSANDS/ΜL (ref 0–0.1)
BASOPHILS NFR BLD AUTO: 1 % (ref 0–1)
BUN SERPL-MCNC: 15 MG/DL (ref 5–25)
CALCIUM SERPL-MCNC: 9.7 MG/DL (ref 8.4–10.2)
CHLORIDE SERPL-SCNC: 102 MMOL/L (ref 97–108)
CO2 SERPL-SCNC: 27 MMOL/L (ref 22–30)
CREAT SERPL-MCNC: 0.69 MG/DL (ref 0.6–1.2)
EOSINOPHIL # BLD AUTO: 0.1 THOUSAND/ΜL (ref 0–0.4)
EOSINOPHIL NFR BLD AUTO: 3 % (ref 0–6)
ERYTHROCYTE [DISTWIDTH] IN BLOOD BY AUTOMATED COUNT: 12.9 %
GFR SERPL CREATININE-BSD FRML MDRD: 103 ML/MIN/1.73SQ M
GLUCOSE P FAST SERPL-MCNC: 90 MG/DL (ref 70–99)
HCT VFR BLD AUTO: 36.2 % (ref 36–46)
HGB BLD-MCNC: 12.1 G/DL (ref 12–16)
LYMPHOCYTES # BLD AUTO: 2 THOUSANDS/ΜL (ref 0.5–4)
LYMPHOCYTES NFR BLD AUTO: 35 % (ref 25–45)
MCH RBC QN AUTO: 32.6 PG (ref 26–34)
MCHC RBC AUTO-ENTMCNC: 33.5 G/DL (ref 31–36)
MCV RBC AUTO: 97 FL (ref 80–100)
MONOCYTES # BLD AUTO: 0.6 THOUSAND/ΜL (ref 0.2–0.9)
MONOCYTES NFR BLD AUTO: 10 % (ref 1–10)
NEUTROPHILS # BLD AUTO: 3 THOUSANDS/ΜL (ref 1.8–7.8)
NEUTS SEG NFR BLD AUTO: 52 % (ref 45–65)
P AXIS: 32 DEGREES
PLATELET # BLD AUTO: 322 THOUSANDS/UL (ref 150–450)
PMV BLD AUTO: 9.5 FL (ref 8.9–12.7)
POTASSIUM SERPL-SCNC: 3.9 MMOL/L (ref 3.6–5)
PR INTERVAL: 146 MS
QRS AXIS: 41 DEGREES
QRSD INTERVAL: 82 MS
QT INTERVAL: 418 MS
QTC INTERVAL: 418 MS
RBC # BLD AUTO: 3.71 MILLION/UL (ref 4–5.2)
SODIUM SERPL-SCNC: 138 MMOL/L (ref 137–147)
T WAVE AXIS: 31 DEGREES
VENTRICULAR RATE: 60 BPM
WBC # BLD AUTO: 5.8 THOUSAND/UL (ref 4.5–11)

## 2019-07-03 PROCEDURE — 85025 COMPLETE CBC W/AUTO DIFF WBC: CPT

## 2019-07-03 PROCEDURE — 93005 ELECTROCARDIOGRAM TRACING: CPT

## 2019-07-03 PROCEDURE — 80048 BASIC METABOLIC PNL TOTAL CA: CPT

## 2019-07-03 PROCEDURE — 93010 ELECTROCARDIOGRAM REPORT: CPT | Performed by: INTERNAL MEDICINE

## 2019-07-03 PROCEDURE — 36415 COLL VENOUS BLD VENIPUNCTURE: CPT

## 2019-07-10 ENCOUNTER — ANESTHESIA EVENT (OUTPATIENT)
Dept: PERIOP | Facility: HOSPITAL | Age: 49
End: 2019-07-10
Payer: COMMERCIAL

## 2019-07-10 NOTE — PRE-PROCEDURE INSTRUCTIONS
No outpatient medications have been marked as taking for the 7/11/19 encounter [facility] Encounter)

## 2019-07-10 NOTE — ANESTHESIA PREPROCEDURE EVALUATION
Review of Systems/Medical History  Patient summary reviewed  Chart reviewed      Cardiovascular  Hyperlipidemia,    Pulmonary  Sleep apnea ,        GI/Hepatic  Negative GI/hepatic ROS          Negative  ROS        Endo/Other  Negative endo/other ROS   Obesity    GYN  Negative gynecology ROS          Hematology  Anemia anemia of chronic disease and iron deficiency anemia,     Musculoskeletal  Negative musculoskeletal ROS        Neurology  Negative neurology ROS      Psychology   Anxiety,              Physical Exam    Airway    Mallampati score: II  TM Distance: >3 FB  Neck ROM: full     Dental       Cardiovascular  Cardiovascular exam normal    Pulmonary  Pulmonary exam normal     Other Findings        Anesthesia Plan  ASA Score- 2     Anesthesia Type- general with ASA Monitors  Additional Monitors:   Airway Plan: ETT  Plan Factors-    Induction- intravenous  Postoperative Plan-     Informed Consent- Anesthetic plan and risks discussed with patient  I personally reviewed this patient with the CRNA  Discussed and agreed on the Anesthesia Plan with the CRNA  Sammy Fisher

## 2019-07-11 ENCOUNTER — ANESTHESIA (OUTPATIENT)
Dept: PERIOP | Facility: HOSPITAL | Age: 49
End: 2019-07-11
Payer: COMMERCIAL

## 2019-07-11 ENCOUNTER — HOSPITAL ENCOUNTER (OUTPATIENT)
Facility: HOSPITAL | Age: 49
Setting detail: OUTPATIENT SURGERY
Discharge: HOME | End: 2019-07-11
Attending: OTOLARYNGOLOGY | Admitting: OTOLARYNGOLOGY
Payer: COMMERCIAL

## 2019-07-11 VITALS
HEIGHT: 62 IN | OXYGEN SATURATION: 95 % | DIASTOLIC BLOOD PRESSURE: 64 MMHG | RESPIRATION RATE: 16 BRPM | TEMPERATURE: 99.1 F | SYSTOLIC BLOOD PRESSURE: 139 MMHG | HEART RATE: 64 BPM | BODY MASS INDEX: 30.73 KG/M2 | WEIGHT: 167 LBS

## 2019-07-11 DIAGNOSIS — J35.01 CHRONIC TONSILLITIS: Primary | ICD-10-CM

## 2019-07-11 DIAGNOSIS — J35.1 HYPERTROPHY OF TONSILS: ICD-10-CM

## 2019-07-11 LAB
EXT PREGNANCY TEST URINE: NEGATIVE
EXT. CONTROL: NORMAL

## 2019-07-11 PROCEDURE — 81025 URINE PREGNANCY TEST: CPT | Performed by: OTOLARYNGOLOGY

## 2019-07-11 PROCEDURE — 42826 REMOVAL OF TONSILS: CPT | Performed by: OTOLARYNGOLOGY

## 2019-07-11 RX ORDER — ROCURONIUM BROMIDE 10 MG/ML
INJECTION, SOLUTION INTRAVENOUS AS NEEDED
Status: DISCONTINUED | OUTPATIENT
Start: 2019-07-11 | End: 2019-07-11 | Stop reason: SURG

## 2019-07-11 RX ORDER — SODIUM CHLORIDE, SODIUM LACTATE, POTASSIUM CHLORIDE, CALCIUM CHLORIDE 600; 310; 30; 20 MG/100ML; MG/100ML; MG/100ML; MG/100ML
125 INJECTION, SOLUTION INTRAVENOUS CONTINUOUS
Status: DISCONTINUED | OUTPATIENT
Start: 2019-07-11 | End: 2019-07-11 | Stop reason: HOSPADM

## 2019-07-11 RX ORDER — ONDANSETRON 2 MG/ML
INJECTION INTRAMUSCULAR; INTRAVENOUS AS NEEDED
Status: DISCONTINUED | OUTPATIENT
Start: 2019-07-11 | End: 2019-07-11 | Stop reason: SURG

## 2019-07-11 RX ORDER — GLYCOPYRROLATE 0.2 MG/ML
INJECTION INTRAMUSCULAR; INTRAVENOUS AS NEEDED
Status: DISCONTINUED | OUTPATIENT
Start: 2019-07-11 | End: 2019-07-11 | Stop reason: SURG

## 2019-07-11 RX ORDER — FENTANYL CITRATE/PF 50 MCG/ML
25 SYRINGE (ML) INJECTION
Status: DISCONTINUED | OUTPATIENT
Start: 2019-07-11 | End: 2019-07-11 | Stop reason: HOSPADM

## 2019-07-11 RX ORDER — HYDROMORPHONE HCL/PF 1 MG/ML
0.5 SYRINGE (ML) INJECTION
Status: DISCONTINUED | OUTPATIENT
Start: 2019-07-11 | End: 2019-07-11 | Stop reason: HOSPADM

## 2019-07-11 RX ORDER — DIPHENHYDRAMINE HYDROCHLORIDE 50 MG/ML
12.5 INJECTION INTRAMUSCULAR; INTRAVENOUS ONCE AS NEEDED
Status: DISCONTINUED | OUTPATIENT
Start: 2019-07-11 | End: 2019-07-11 | Stop reason: HOSPADM

## 2019-07-11 RX ORDER — DEXAMETHASONE SODIUM PHOSPHATE 10 MG/ML
INJECTION, SOLUTION INTRAMUSCULAR; INTRAVENOUS AS NEEDED
Status: DISCONTINUED | OUTPATIENT
Start: 2019-07-11 | End: 2019-07-11 | Stop reason: SURG

## 2019-07-11 RX ORDER — ACETAMINOPHEN 160 MG/5ML
640 SUSPENSION, ORAL (FINAL DOSE FORM) ORAL
Status: DISCONTINUED | OUTPATIENT
Start: 2019-07-11 | End: 2019-07-11 | Stop reason: HOSPADM

## 2019-07-11 RX ORDER — LIDOCAINE HYDROCHLORIDE 10 MG/ML
INJECTION, SOLUTION INFILTRATION; PERINEURAL AS NEEDED
Status: DISCONTINUED | OUTPATIENT
Start: 2019-07-11 | End: 2019-07-11 | Stop reason: SURG

## 2019-07-11 RX ORDER — ONDANSETRON 2 MG/ML
4 INJECTION INTRAMUSCULAR; INTRAVENOUS EVERY 6 HOURS PRN
Status: DISCONTINUED | OUTPATIENT
Start: 2019-07-11 | End: 2019-07-11 | Stop reason: HOSPADM

## 2019-07-11 RX ORDER — OXYCODONE HCL 5 MG/5 ML
5 SOLUTION, ORAL ORAL EVERY 4 HOURS PRN
Qty: 150 ML | Refills: 0 | Status: SHIPPED | OUTPATIENT
Start: 2019-07-11 | End: 2019-08-01

## 2019-07-11 RX ORDER — FENTANYL CITRATE 50 UG/ML
INJECTION, SOLUTION INTRAMUSCULAR; INTRAVENOUS AS NEEDED
Status: DISCONTINUED | OUTPATIENT
Start: 2019-07-11 | End: 2019-07-11 | Stop reason: SURG

## 2019-07-11 RX ORDER — MIDAZOLAM HYDROCHLORIDE 1 MG/ML
INJECTION INTRAMUSCULAR; INTRAVENOUS AS NEEDED
Status: DISCONTINUED | OUTPATIENT
Start: 2019-07-11 | End: 2019-07-11 | Stop reason: SURG

## 2019-07-11 RX ORDER — CEFAZOLIN SODIUM 2 G/50ML
2000 SOLUTION INTRAVENOUS EVERY 8 HOURS
Status: DISCONTINUED | OUTPATIENT
Start: 2019-07-11 | End: 2019-07-11 | Stop reason: HOSPADM

## 2019-07-11 RX ORDER — PROPOFOL 10 MG/ML
INJECTION, EMULSION INTRAVENOUS AS NEEDED
Status: DISCONTINUED | OUTPATIENT
Start: 2019-07-11 | End: 2019-07-11 | Stop reason: SURG

## 2019-07-11 RX ORDER — NEOSTIGMINE METHYLSULFATE 1 MG/ML
INJECTION INTRAVENOUS AS NEEDED
Status: DISCONTINUED | OUTPATIENT
Start: 2019-07-11 | End: 2019-07-11 | Stop reason: SURG

## 2019-07-11 RX ORDER — ACETAMINOPHEN 160 MG/5ML
20 SOLUTION ORAL
Qty: 300 ML | Refills: 1 | Status: SHIPPED | OUTPATIENT
Start: 2019-07-11 | End: 2019-08-01

## 2019-07-11 RX ADMIN — GLYCOPYRROLATE 0.4 MG: 0.2 INJECTION, SOLUTION INTRAMUSCULAR; INTRAVENOUS at 10:09

## 2019-07-11 RX ADMIN — DEXAMETHASONE SODIUM PHOSPHATE 8 MG: 10 INJECTION, SOLUTION INTRAMUSCULAR; INTRAVENOUS at 09:27

## 2019-07-11 RX ADMIN — NEOSTIGMINE METHYLSULFATE 3 MG: 1 INJECTION INTRAVENOUS at 10:09

## 2019-07-11 RX ADMIN — ROCURONIUM BROMIDE 30 MG: 10 INJECTION, SOLUTION INTRAVENOUS at 09:18

## 2019-07-11 RX ADMIN — FENTANYL CITRATE 100 MCG: 50 INJECTION INTRAMUSCULAR; INTRAVENOUS at 09:17

## 2019-07-11 RX ADMIN — FENTANYL CITRATE 25 MCG: 50 INJECTION INTRAMUSCULAR; INTRAVENOUS at 10:45

## 2019-07-11 RX ADMIN — ONDANSETRON HYDROCHLORIDE 4 MG: 2 INJECTION, SOLUTION INTRAMUSCULAR; INTRAVENOUS at 09:27

## 2019-07-11 RX ADMIN — PROPOFOL 200 MG: 10 INJECTION, EMULSION INTRAVENOUS at 09:18

## 2019-07-11 RX ADMIN — FENTANYL CITRATE 25 MCG: 50 INJECTION INTRAMUSCULAR; INTRAVENOUS at 10:28

## 2019-07-11 RX ADMIN — LIDOCAINE HYDROCHLORIDE 50 MG: 10 INJECTION, SOLUTION INFILTRATION; PERINEURAL at 09:18

## 2019-07-11 RX ADMIN — ROCURONIUM BROMIDE 5 MG: 10 INJECTION, SOLUTION INTRAVENOUS at 09:39

## 2019-07-11 RX ADMIN — SODIUM CHLORIDE, SODIUM LACTATE, POTASSIUM CHLORIDE, AND CALCIUM CHLORIDE 125 ML/HR: .6; .31; .03; .02 INJECTION, SOLUTION INTRAVENOUS at 10:36

## 2019-07-11 RX ADMIN — CEFAZOLIN SODIUM 2000 MG: 2 SOLUTION INTRAVENOUS at 09:17

## 2019-07-11 RX ADMIN — FENTANYL CITRATE 25 MCG: 50 INJECTION INTRAMUSCULAR; INTRAVENOUS at 10:34

## 2019-07-11 RX ADMIN — FENTANYL CITRATE 25 MCG: 50 INJECTION INTRAMUSCULAR; INTRAVENOUS at 10:39

## 2019-07-11 RX ADMIN — MIDAZOLAM HYDROCHLORIDE 2 MG: 1 INJECTION, SOLUTION INTRAMUSCULAR; INTRAVENOUS at 09:17

## 2019-07-11 RX ADMIN — SODIUM CHLORIDE, SODIUM LACTATE, POTASSIUM CHLORIDE, AND CALCIUM CHLORIDE: .6; .31; .03; .02 INJECTION, SOLUTION INTRAVENOUS at 09:07

## 2019-07-11 RX ADMIN — IBUPROFEN 200 MG: 100 SUSPENSION ORAL at 13:50

## 2019-07-11 NOTE — NURSING NOTE
Pt OOB voided  Throat remains dry, no evidence of bleeding  Written and verbal instructions given to pt and family, who verbalize an understanding and voices no questions or complaints  Other than moderate surgical discomfort of 5/10  RX filled by hospital retail pharmacy and instructions given on how and when to take

## 2019-07-11 NOTE — OP NOTE
OPERATIVE REPORT  PATIENT NAME: Deb Anthony    :  1970  MRN: [id number]  Pt Location:  OR ROOM 01    SURGERY DATE: 2019    Surgeon(s) and Role:     Payam Zaidi MD - Primary    Preop Diagnosis:  Chronic tonsillitis  Chronic hypertrophy of tonsils  Sleep apnea       Post-Op Diagnosis Codes:  Chronic tonsillitis  Chronic hypertrophy of tonsils  Sleep apnea       Procedure(s) (LRB):  TONSILLECTOMY (Bilateral)    Specimen(s):  * No specimens in log *    Estimated Blood Loss:   100ml    Drains:  * No LDAs found *    Anesthesia Type:   General    Operative Indications:  Chronic tonsillitis  Chronic hypertrophy of tonsils  Sleep apnea     Operative Findings:  Tonsils 4+ elongated uvula    Complications:   None    Procedure and Technique:  Wellington Hoffman is a 50years old woman with history of chronic tonsillitis and snoring with occasional apneas  Risks benefits and alternatives of tonsillectomy and adenoidectomy was discussed with patient who decided to proceed with surgery and informed consent was obtained  Patient was met in the holding area positively identified risks benefits and alternatives were reviewed  Questions answered as needed  The informed consent was confirmed  Patient was transferred to the operating room and placed in supine position the operating table general anesthesia was administered in the standard fashion  The table was shifted 90° a shoulder roll was placed for extension of the neck patient was then prepped and draped in usual fashion for tonsillectomy  A  timeout was carried on in which patient's identification and planned procedure were confirmed by the staff present in the operating room  A Mac Stuart mouthgag was then placed into the oral cavity and opened obtaining good exposure of the oropharynx  Digital examination revealed no submucosal cleft  The mouthgag was then suspended from the major table  Right tonsil was clamped with Allis forceps and traction applied   The tonsil was then dissected free from the superior constrictor muscles using the electrocautery  Tonsil was excised and handed to the scrub nurse  Contralateral tonsil was done in identical fashion  Additional hemostasis was obtained as needed with the suction Bovie  A n arterial bleeder was noted in the left mid pole area, and had been cauterized twicw   2 figure of 8 2/0 chromic stitches were placed to obtail proper hemostasis  A red rubber catheter was introduced on the left nasal cavity recovered in the oropharynx and used to retract the soft palate  Examination of the nasopharynx using a mirror revealed no hypertrophy of adenoids in nasopharynx  Uvula was noted to be large and elongated, the uvula was excised near the base with bovie  2/0 chromic stitches were then placed closing mucosa over stump and advancing posterior pilar and soft palate anteriorly  The oral cavity and oropharynx were irrigated with saline  The saline was suctioned noticing proper hemostasis of the tonsillar beds and nasopharynx  The mouthgag was left without tension for approximately 3 minutes to confirm proper hemostasis and the beds were noticed to be dry  All counts were correct at the completion of the procedure there were no complications  Patient was handed back to the anesthesiologist who proceeded to wean the patient from anesthesia and extubated   Patient was transferred to recovery in good stable condition     I was present for the entire procedure    Patient Disposition:  PACU     SIGNATURE: Ronnie Fleming MD  DATE: July 11, 2019  TIME: 10:17 AM

## 2019-07-11 NOTE — NURSING NOTE
Pt is awake,alert,tolerated clear liquids,no bleeding noted in back of throat or nasally  Pt seen earlier by Dr Matilda Will

## 2019-07-11 NOTE — DISCHARGE INSTRUCTIONS
Soft Diet   WHAT YOU NEED TO KNOW:   A soft diet is made up of foods that are soft and easy to chew and swallow  These foods may be chopped, ground, mashed, pureed, and moist  You may need to follow this diet if you have had certain types of surgery, such as head, neck, or stomach surgery  You may also need to follow this diet if you have problems with your teeth or mouth that make it hard for you to chew or swallow food  Your dietitian will tell you how to follow this diet and what consistency of liquids you may have  DISCHARGE INSTRUCTIONS:   How to prepare soft food:   · Cut food into small pieces that are ½ inch or smaller in size because they are easier to swallow  · Use chicken broth, beef broth, gravy, or sauces to cook or moisten meats and vegetables  Cook vegetables until they are soft enough to be mashed with a fork  · Use a  to grind or puree foods to make them easier to chew and swallow  · Use fruit juice to blend fruit  · Strain soups that have pieces of meat or vegetables that are larger than ½ inch    Foods you can include:   · Breads, cereals, rice, and pasta:      ¨ Breads, muffins, pancakes, or waffles moistened with syrup, jelly, margarine or butter    ¨ Moist dry or cooked cereal    ¨ Macaroni, pasta, noodles, or rice    ¨ Saltine crackers moistened in soup or other liquid    · Fruits and vegetables:      ¨ Applesauce or canned fruit without seeds or skin    ¨ Cooked fruits or ripe, soft peeled fruits, such as bananas, peaches, or melon    ¨ Soft, well-cooked vegetables without seeds or skin    · Meat and other protein sources:      ¨ Poached, scrambled, or cooked eggs    ¨ Moist, tender meat, fish, or poultry that is ground or chopped into small pieces    ¨ Soups with small soft pieces of vegetables and meat    ¨ Tofu or well-cooked, slightly mashed, moist legumes, such as baked beans    · Dairy:      ¨ Cheese (in sauces or melted in other dishes), cottage cheese, or ricotta cheese    ¨ Milk or milk drinks, milkshakes    ¨ Ice cream, sherbet, or frozen yogurt without fruit or nuts    ¨ Yogurt (plain or with soft fruits)    · Desserts:      ¨ Gelatin dessert with soft canned fruit, pudding, or custard    ¨ Fruit cobbler with soft breading or crumb mixture (no seeds or nuts), or fruit pie with soft bottom crust only    ¨ Soft, moist cake or cookie that has been moistened in milk, coffee, or other liquid  Foods to avoid:  Avoid any foods that are hard for you to chew or swallow, such as the following:  · Starches:      ¨ Dry bread, toast, crackers, and cereal    ¨ Cereal, cake, and breads with coconut, dried fruit, nuts, and other seeds    ¨ Corn, potato, and tortilla chips and taco shells    ¨ Breads with tough crusts, such as bagels, [employer] bread, and sourdough bread    ¨ Popcorn    · Vegetables:      ¨ Corn and peas    ¨ Raw, hard vegetables that cannot be mashed easily, such as carrots, broccoli, cauliflower, and celery    ¨ Crisp fried vegetables, such as potatoes    · Fruits:      ¨ Raw, crisp fruits, such as apples and pears and dried fruit    ¨ Stringy fruits, such as pineapple and lesly    ¨ Cooked fruit with skin and seeds    · Dairy, meats, and protein foods:      ¨ Yogurt or ice cream with coconut, nuts, and granola    ¨ Dry meats (beef jerky) and tough meats (such as neff, sausage, hot dogs, and bratwurst)    ¨ Casseroles with large chunks of meat    ¨ Peanut butter (creamy and crunchy)  © 2017 [employer] Information is for End User's use only and may not be sold, redistributed or otherwise used for commercial purposes  All illustrations and images included in CareNotes® are the copyrighted property of A D A M , [employer]  or Baldev Moya  The above information is an  only  It is not intended as medical advice for individual conditions or treatments   Talk to your doctor, nurse or pharmacist before following any medical regimen to see if it is safe and effective for you  Tonsillectomy   AMBULATORY CARE:   What do I need to know about a tonsillectomy? A tonsillectomy is surgery to remove your tonsils  Tonsils are 2 large lumps of tissue in the back of your throat  Your tonsils may need to be removed if you have frequent throat infections or trouble breathing during sleep  How do I prepare for a tonsillectomy? Your healthcare provider will talk to you about how to prepare for surgery  He may tell you not to eat or drink anything after midnight on the day of your surgery  He will tell you to stop taking aspirin 2 weeks before your surgery  He will tell you what medicines to take or not take on the day of your surgery  You will need someone to drive you home after surgery  What will happen during a tonsillectomy? You will be given general anesthesia to keep you asleep and free from pain during surgery  You may also be given medicine before your surgery to help prevent nausea and vomiting  The surgeon will place tools inside your mouth to keep it open  He will then cut all or part of your tonsils away from the surrounding tissue  Your surgeon will then stop any bleeding from the areas where the tonsils were removed  What will happen after a tonsillectomy? You will have throat pain that may last up to 2 weeks  Your throat pain will be worse in the morning  This pain may spread to your ears  It may hurt for you to swallow, and you may not feel like eating or drinking  You will need to drink plenty of liquids to prevent dehydration  You will have white patches in the back of your throat  These are scabs that will fall off after about a week  What are the risks of a tonsillectomy? You may bleed more than expected during or after surgery, or get an infection  You may also have swelling in your mouth, throat, or lungs that makes it hard to breathe  You may have nausea and vomiting after surgery   You may have changes in your voice or sense of taste after surgery  Tools used to remove your tonsils may cause injury to your teeth, voice box, or palate  Tools that use heat or a laser to remove your tonsils can cause a burn  Your tonsils could still grow back after surgery  Follow up with your healthcare provider as directed:  Write down your questions so you remember to ask them during your visits  © 2017 [employer] [employer]  Information is for End User's use only and may not be sold, redistributed or otherwise used for commercial purposes  All illustrations and images included in CareNotes® are the copyrighted property of [employer] , [employer]  or Baldev Moya  The above information is an  only  It is not intended as medical advice for individual conditions or treatments  Talk to your doctor, nurse or pharmacist before following any medical regimen to see if it is safe and effective for you

## 2019-07-11 NOTE — PERIOPERATIVE NURSING NOTE
Returned from pacu sleepy but arousable  ivs running  No oral bleeding noted  Taking sips of liquids

## 2019-07-11 NOTE — ANESTHESIA POSTPROCEDURE EVALUATION
Post-Op Assessment Note    CV Status:  Stable  Pain Score: 0    Pain management: satisfactory to patient     Mental Status:  Alert   Hydration Status:  Stable   PONV Controlled:  Controlled   Airway Patency:  Patent   Post Op Vitals Reviewed: Yes      Staff: CRNA           BP      Temp (!) (P) 97 °F (36 1 °C) (07/11/19 1021)    Pulse (P) 78 (07/11/19 1021)   Resp (P) 16 (07/11/19 1021)    SpO2 (P) 100 % (07/11/19 1021)

## 2019-07-18 ENCOUNTER — APPOINTMENT (EMERGENCY)
Dept: CT IMAGING | Facility: HOSPITAL | Age: 49
End: 2019-07-18
Payer: COMMERCIAL

## 2019-07-18 ENCOUNTER — HOSPITAL ENCOUNTER (EMERGENCY)
Facility: HOSPITAL | Age: 49
Discharge: HOME | End: 2019-07-18
Attending: EMERGENCY MEDICINE | Admitting: EMERGENCY MEDICINE
Payer: COMMERCIAL

## 2019-07-18 VITALS
SYSTOLIC BLOOD PRESSURE: 160 MMHG | TEMPERATURE: 97.7 F | RESPIRATION RATE: 18 BRPM | BODY MASS INDEX: 28.8 KG/M2 | WEIGHT: 156.5 LBS | HEART RATE: 82 BPM | DIASTOLIC BLOOD PRESSURE: 89 MMHG | HEIGHT: 62 IN | OXYGEN SATURATION: 100 %

## 2019-07-18 DIAGNOSIS — J02.9 SORE THROAT: Primary | ICD-10-CM

## 2019-07-18 LAB
ANION GAP SERPL CALCULATED.3IONS-SCNC: 9 MMOL/L (ref 5–14)
BASOPHILS # BLD AUTO: 0.2 THOUSANDS/ΜL (ref 0–0.1)
BASOPHILS NFR BLD AUTO: 2 % (ref 0–1)
BUN SERPL-MCNC: 12 MG/DL (ref 5–25)
CALCIUM SERPL-MCNC: 9.9 MG/DL (ref 8.4–10.2)
CHLORIDE SERPL-SCNC: 100 MMOL/L (ref 97–108)
CO2 SERPL-SCNC: 31 MMOL/L (ref 22–30)
CREAT SERPL-MCNC: 0.66 MG/DL (ref 0.6–1.2)
EOSINOPHIL # BLD AUTO: 0.1 THOUSAND/ΜL (ref 0–0.4)
EOSINOPHIL NFR BLD AUTO: 2 % (ref 0–6)
ERYTHROCYTE [DISTWIDTH] IN BLOOD BY AUTOMATED COUNT: 12.8 %
GFR SERPL CREATININE-BSD FRML MDRD: 105 ML/MIN/1.73SQ M
GLUCOSE SERPL-MCNC: 99 MG/DL (ref 70–99)
HCT VFR BLD AUTO: 36.1 % (ref 36–46)
HGB BLD-MCNC: 11.9 G/DL (ref 12–16)
LYMPHOCYTES # BLD AUTO: 1.9 THOUSANDS/ΜL (ref 0.5–4)
LYMPHOCYTES NFR BLD AUTO: 23 % (ref 25–45)
MCH RBC QN AUTO: 32.2 PG (ref 26–34)
MCHC RBC AUTO-ENTMCNC: 32.9 G/DL (ref 31–36)
MCV RBC AUTO: 98 FL (ref 80–100)
MONOCYTES # BLD AUTO: 0.6 THOUSAND/ΜL (ref 0.2–0.9)
MONOCYTES NFR BLD AUTO: 7 % (ref 1–10)
NEUTROPHILS # BLD AUTO: 5.5 THOUSANDS/ΜL (ref 1.8–7.8)
NEUTS SEG NFR BLD AUTO: 66 % (ref 45–65)
PLATELET # BLD AUTO: 419 THOUSANDS/UL (ref 150–450)
PMV BLD AUTO: 8.4 FL (ref 8.9–12.7)
POTASSIUM SERPL-SCNC: 4.2 MMOL/L (ref 3.6–5)
RBC # BLD AUTO: 3.69 MILLION/UL (ref 4–5.2)
SODIUM SERPL-SCNC: 140 MMOL/L (ref 137–147)
WBC # BLD AUTO: 8.3 THOUSAND/UL (ref 4.5–11)

## 2019-07-18 PROCEDURE — 99284 EMERGENCY DEPT VISIT MOD MDM: CPT

## 2019-07-18 PROCEDURE — 99285 EMERGENCY DEPT VISIT HI MDM: CPT | Performed by: EMERGENCY MEDICINE

## 2019-07-18 PROCEDURE — 80048 BASIC METABOLIC PNL TOTAL CA: CPT | Performed by: EMERGENCY MEDICINE

## 2019-07-18 PROCEDURE — 70491 CT SOFT TISSUE NECK W/DYE: CPT

## 2019-07-18 PROCEDURE — 85025 COMPLETE CBC W/AUTO DIFF WBC: CPT | Performed by: EMERGENCY MEDICINE

## 2019-07-18 PROCEDURE — 96365 THER/PROPH/DIAG IV INF INIT: CPT

## 2019-07-18 PROCEDURE — NC001 PR NO CHARGE: Performed by: EMERGENCY MEDICINE

## 2019-07-18 PROCEDURE — 36415 COLL VENOUS BLD VENIPUNCTURE: CPT | Performed by: EMERGENCY MEDICINE

## 2019-07-18 PROCEDURE — 96375 TX/PRO/DX INJ NEW DRUG ADDON: CPT

## 2019-07-18 RX ORDER — MORPHINE SULFATE 4 MG/ML
4 INJECTION, SOLUTION INTRAMUSCULAR; INTRAVENOUS ONCE
Status: COMPLETED | OUTPATIENT
Start: 2019-07-18 | End: 2019-07-18

## 2019-07-18 RX ORDER — AMOXICILLIN AND CLAVULANATE POTASSIUM 400; 57 MG/5ML; MG/5ML
800 POWDER, FOR SUSPENSION ORAL 2 TIMES DAILY
Qty: 140 ML | Refills: 0 | Status: SHIPPED | OUTPATIENT
Start: 2019-07-18 | End: 2019-07-25

## 2019-07-18 RX ORDER — PREDNISOLONE 15 MG/5 ML
40 SOLUTION, ORAL ORAL DAILY
Qty: 66.5 ML | Refills: 0 | Status: SHIPPED | OUTPATIENT
Start: 2019-07-18 | End: 2019-07-23

## 2019-07-18 RX ADMIN — IOHEXOL 85 ML: 350 INJECTION, SOLUTION INTRAVENOUS at 19:51

## 2019-07-18 RX ADMIN — MORPHINE SULFATE 4 MG: 4 INJECTION INTRAVENOUS at 19:23

## 2019-07-18 RX ADMIN — DEXAMETHASONE SODIUM PHOSPHATE 12 MG: 10 INJECTION, SOLUTION INTRAMUSCULAR; INTRAVENOUS at 19:31

## 2019-07-18 RX ADMIN — SODIUM CHLORIDE 1000 ML: 0.9 INJECTION, SOLUTION INTRAVENOUS at 19:22

## 2019-07-18 NOTE — ED PROVIDER NOTES
History  Chief Complaint   Patient presents with    Sore Throat     Pt c/o throat pain due to tonsillectomy 1 wk ago  Pt c/o fever last night ,can not swallow and can "feel the stitches "     Patient is a 27-year-old female presenting for complaints of sore throat  She states that she she had her tonsils taken out on 07/11/2019  She felt "while going home but over the last 2 days has had worsening pain and swelling in her throat  She has discomfort with swallowing solids and liquids but is still able to tolerate liquids and her own secretions  Denies feelings of her airway closing or difficulty breathing  Admits to subjective fevers, denies chills, pain with swallowing so she has to cough things up but denies vomiting, diarrhea  Has been trying Motrin at home without relief  Last dose was approximately 1 hour prior to arrival       Neck Pain   Quality:  Aching  Pain radiates to:  Does not radiate  Pain severity:  Moderate  Onset quality:  Gradual  Duration:  2 days  Timing:  Constant  Progression:  Worsening  Chronicity:  New  Relieved by:  Nothing  Worsened by:  Swallowing  Associated symptoms: fever    Associated symptoms: no chest pain, no headaches, no numbness, no tingling and no weakness        Prior to Admission Medications   Prescriptions Last Dose Informant Patient Reported? Taking?    RA VITAMIN B-12 TR 1000 MCG TBCR   Yes No   acetaminophen (TYLENOL) 160 mg/5 mL solution   No No   Sig: Take 20 mL (640 mg total) by mouth every 4 (four) hours while awake   cyclobenzaprine (FLEXERIL) 5 mg tablet  Self No No   Sig: Take 1 tablet (5 mg total) by mouth daily at bedtime   ergocalciferol (VITAMIN D2) 50,000 units  Self No No   Sig: Take 1 capsule (50,000 Units total) by mouth once a week   ibuprofen (MOTRIN) 100 mg/5 mL suspension   No No   Sig: Take 15 mL (300 mg total) by mouth every 8 (eight) hours as needed for moderate pain   meloxicam (MOBIC) 7 5 mg tablet  Self No No   Sig: Take 1 tablet (7 5 mg total) by mouth daily   oxyCODONE (ROXICODONE) 5 mg/5 mL solution   No No   Sig: Take 5 mL (5 mg total) by mouth every 4 (four) hours as needed for severe pain ((breakthrough pain))Max Daily Amount: 30 mg      Facility-Administered Medications: None       Past Medical History:   Diagnosis Date    Back pain     Dental disease     Ear problems     Fatigue     Hyperlipidemia     no meds at present    Iron deficiency anemia     iron and B12 def, has had iron infusions    Nasal congestion     Palpitation     Sleep apnea     Tonsillitis     Vitamin B12 deficiency     Vitamin D deficiency        Past Surgical History:   Procedure Laterality Date     SECTION      TONSILLECTOMY Bilateral 2019    Procedure: TONSILLECTOMY;  Surgeon: Lorena Krause MD;  Location: [address] [place] [address];  Service: ENT    TUBAL LIGATION         Family History   Problem Relation Age of Onset    Diabetes Mother     Cancer Mother     Stroke Mother     Hypertension Mother     Lung cancer Mother     Hyperlipidemia Family      I have reviewed and agree with the history as documented  Social History     Tobacco Use    Smoking status: Former Smoker    Smokeless tobacco: Never Used   Substance Use Topics    Alcohol use: No     Comment: social    Drug use: No        Review of Systems   Constitutional: Positive for fever  Negative for chills  HENT: Positive for sore throat and trouble swallowing  Negative for congestion, ear pain, facial swelling, rhinorrhea, sinus pain and voice change  Eyes: Negative  Negative for pain and visual disturbance  Respiratory: Negative  Negative for cough, shortness of breath and wheezing  Cardiovascular: Negative  Negative for chest pain and palpitations  Gastrointestinal: Negative for abdominal pain, diarrhea, nausea and vomiting  Genitourinary: Negative  Negative for dysuria and frequency  Musculoskeletal: Positive for neck pain  Negative for neck stiffness  Skin: Negative  Negative for rash  Neurological: Negative  Negative for dizziness, tingling, speech difficulty, weakness, light-headedness, numbness and headaches  Physical Exam  Physical Exam   Constitutional: She is oriented to person, place, and time  She appears well-developed and well-nourished  No distress  HENT:   Head: Normocephalic and atraumatic  Right Ear: Hearing, tympanic membrane and ear canal normal    Left Ear: Hearing, tympanic membrane and ear canal normal    Mouth/Throat: Uvula is midline  Posterior oropharyngeal edema present  Tonsils are 2+ on the right  Tonsils are 2+ on the left  No tonsillar exudate  Eyes: Pupils are equal, round, and reactive to light  Conjunctivae and EOM are normal    Neck: Normal range of motion  Neck supple  No tracheal deviation present  Cardiovascular: Normal rate, regular rhythm and intact distal pulses  Pulmonary/Chest: Effort normal and breath sounds normal  No respiratory distress  She has no wheezes  She has no rales  Abdominal: Soft  Bowel sounds are normal  She exhibits no distension  There is no tenderness  There is no rebound and no guarding  Musculoskeletal: Normal range of motion  She exhibits no tenderness or deformity  Neurological: She is alert and oriented to person, place, and time  Skin: Skin is warm and dry  Capillary refill takes less than 2 seconds  No rash noted  Psychiatric: She has a normal mood and affect  Her behavior is normal    Nursing note and vitals reviewed        Vital Signs  ED Triage Vitals [07/18/19 1858]   Temperature Pulse Respirations Blood Pressure SpO2   97 7 °F (36 5 °C) 82 18 160/89 100 %      Temp Source Heart Rate Source Patient Position - Orthostatic VS BP Location FiO2 (%)   Tympanic Monitor Sitting Left arm --      Pain Score       --           Vitals:    07/18/19 1858   BP: 160/89   Pulse: 82   Patient Position - Orthostatic VS: Sitting         Visual Acuity      ED Medications  Medications   sodium chloride 0 9 % bolus 1,000 mL (has no administration in time range)   dexamethasone (DECADRON) 12 mg in sodium chloride 0 9 % 50 mL IVPB (has no administration in time range)   morphine (PF) 4 mg/mL injection 4 mg (has no administration in time range)       Diagnostic Studies  Results Reviewed     Procedure Component Value Units Date/Time    CBC and differential [[id number]]     Lab Status:  No result Specimen:  Blood     Basic metabolic panel [[id number]]     Lab Status:  No result Specimen:  Blood                  No orders to display              Procedures  Procedures       ED Course                               MDM  Number of Diagnoses or Management Options  Sore throat:   Diagnosis management comments: 25-year-old female arriving for complaints of post surgical swelling  No exit appreciated on examination, tonsils are enlarged  Uvula is midline and without uvular edema  Will order CT scan of the neck to rule out abscess  I have ordered steroids and analgesia  Patient's labs resulted, CT scan of the neck was still pending  My shift came to an end, care was transferred to Dr Heidi Crump, who will disposition the patient appropriately once CT results have returned  If patient has no concerning signs for worsening infection, abscess or other need for surgical intervention, I feel comfortable with patient being started on oral antibiotics, continue steroids for swelling and follow up with providers as outpatient  Amount and/or Complexity of Data Reviewed  Clinical lab tests: ordered and reviewed        Disposition  Final diagnoses:   None     ED Disposition     None      Follow-up Information    None         Patient's Medications   Discharge Prescriptions    No medications on file     No discharge procedures on file      ED Provider  Electronically Signed by           Christian Martins DO  07/20/19 4565

## 2019-07-18 NOTE — ED PROVIDER NOTES
History  Chief Complaint   Patient presents with    Sore Throat     Pt c/o throat pain due to tonsillectomy 1 wk ago  Pt c/o fever last night ,can not swallow and can "feel the stitches "     HPI    Prior to Admission Medications   Prescriptions Last Dose Informant Patient Reported? Taking?    RA VITAMIN B-12 TR 1000 MCG TBCR   Yes No   acetaminophen (TYLENOL) 160 mg/5 mL solution   No No   Sig: Take 20 mL (640 mg total) by mouth every 4 (four) hours while awake   cyclobenzaprine (FLEXERIL) 5 mg tablet  Self No No   Sig: Take 1 tablet (5 mg total) by mouth daily at bedtime   ergocalciferol (VITAMIN D2) 50,000 units  Self No No   Sig: Take 1 capsule (50,000 Units total) by mouth once a week   ibuprofen (MOTRIN) 100 mg/5 mL suspension   No No   Sig: Take 15 mL (300 mg total) by mouth every 8 (eight) hours as needed for moderate pain   meloxicam (MOBIC) 7 5 mg tablet  Self No No   Sig: Take 1 tablet (7 5 mg total) by mouth daily   oxyCODONE (ROXICODONE) 5 mg/5 mL solution   No No   Sig: Take 5 mL (5 mg total) by mouth every 4 (four) hours as needed for severe pain ((breakthrough pain))Max Daily Amount: 30 mg      Facility-Administered Medications: None       Past Medical History:   Diagnosis Date    Back pain     Dental disease     Ear problems     Fatigue     Hyperlipidemia     no meds at present    Iron deficiency anemia     iron and B12 def, has had iron infusions    Nasal congestion     Palpitation     Sleep apnea     Tonsillitis     Vitamin B12 deficiency     Vitamin D deficiency        Past Surgical History:   Procedure Laterality Date     SECTION      TONSILLECTOMY Bilateral 2019    Procedure: TONSILLECTOMY;  Surgeon: Roel Bravo MD;  Location: [address] [place] [address];  Service: ENT    TUBAL LIGATION         Family History   Problem Relation Age of Onset    Diabetes Mother     Cancer Mother     Stroke Mother     Hypertension Mother     Lung cancer Mother     Hyperlipidemia Family      I have reviewed and agree with the history as documented      Social History     Tobacco Use    Smoking status: Former Smoker    Smokeless tobacco: Never Used   Substance Use Topics    Alcohol use: No     Comment: social    Drug use: No        Review of Systems    Physical Exam  Physical Exam    Vital Signs  ED Triage Vitals   Temperature Pulse Respirations Blood Pressure SpO2   07/18/19 1858 07/18/19 1858 07/18/19 1858 07/18/19 1858 07/18/19 1858   97 7 °F (36 5 °C) 82 18 160/89 100 %      Temp Source Heart Rate Source Patient Position - Orthostatic VS BP Location FiO2 (%)   07/18/19 1858 07/18/19 1858 07/18/19 1858 07/18/19 1858 --   Tympanic Monitor Sitting Left arm       Pain Score       07/18/19 1923       Worst Possible Pain           Vitals:    07/18/19 1858   BP: 160/89   Pulse: 82   Patient Position - Orthostatic VS: Sitting         Visual Acuity      ED Medications  Medications   sodium chloride 0 9 % bolus 1,000 mL (1,000 mL Intravenous New Bag 7/18/19 1922)   dexamethasone (DECADRON) 12 mg in sodium chloride 0 9 % 50 mL IVPB (12 mg Intravenous New Bag 7/18/19 1931)   morphine (PF) 4 mg/mL injection 4 mg (4 mg Intravenous Given 7/18/19 1923)   iohexol (OMNIPAQUE) 350 MG/ML injection (MULTI-DOSE) 85 mL (85 mL Intravenous Given 7/18/19 1951)       Diagnostic Studies  Results Reviewed     Procedure Component Value Units Date/Time    Basic metabolic panel [[id number]]  (Abnormal) Collected:  07/18/19 1915    Lab Status:  Final result Specimen:  Blood from Arm, Right Updated:  07/18/19 1932     Sodium 140 mmol/L      Potassium 4 2 mmol/L      Chloride 100 mmol/L      CO2 31 mmol/L      ANION GAP 9 mmol/L      BUN 12 mg/dL      Creatinine 0 66 mg/dL      Glucose 99 mg/dL      Calcium 9 9 mg/dL      eGFR 105 ml/min/1 73sq m     Narrative:       Lalo guidelines for Chronic Kidney Disease (CKD):     Stage 1 with normal or high GFR (GFR > 90 mL/min/1 73 square meters)    Stage 2 Mild CKD (GFR = 60-89 mL/min/1 73 square meters)    Stage 3A Moderate CKD (GFR = 45-59 mL/min/1 73 square meters)    Stage 3B Moderate CKD (GFR = 30-44 mL/min/1 73 square meters)    Stage 4 Severe CKD (GFR = 15-29 mL/min/1 73 square meters)    Stage 5 End Stage CKD (GFR <15 mL/min/1 73 square meters)  Note: GFR calculation is accurate only with a steady state creatinine    CBC and differential [[id number]]  (Abnormal) Collected:  07/18/19 1915    Lab Status:  Final result Specimen:  Blood from Arm, Right Updated:  07/18/19 1926     WBC 8 30 Thousand/uL      RBC 3 69 Million/uL      Hemoglobin 11 9 g/dL      Hematocrit 36 1 %      MCV 98 fL      MCH 32 2 pg      MCHC 32 9 g/dL      RDW 12 8 %      MPV 8 4 fL      Platelets 607 Thousands/uL      Neutrophils Relative 66 %      Lymphocytes Relative 23 %      Monocytes Relative 7 %      Eosinophils Relative 2 %      Basophils Relative 2 %      Neutrophils Absolute 5 50 Thousands/µL      Lymphocytes Absolute 1 90 Thousands/µL      Monocytes Absolute 0 60 Thousand/µL      Eosinophils Absolute 0 10 Thousand/µL      Basophils Absolute 0 20 Thousands/µL                  CT soft tissue neck with contrast    (Results Pending)              Procedures  Procedures       ED Course  ED Course as of Jul 18 2313   Thu Jul 18, 2019 2022 CT back  No abscess  Spoke with patient feeling improved after medications  Will discharge at discussed with Dr Kaleigh Cardoso with prednisone Augmentin  Liquid  Patient states she does not have a follow-up appointment Dr Monica bhagat  On but does have a scheduled appointment with her PCP coming up on 07/29/2019  Return to the ER for any concerns  Stressed patient continue on soft diet                                      MDM  Number of Diagnoses or Management Options  Sore throat:      Amount and/or Complexity of Data Reviewed  Clinical lab tests: reviewed  Tests in the radiology section of CPT®: reviewed  Discuss the patient with other providers: yes        Disposition  Final diagnoses:   None     ED Disposition     None      Follow-up Information    None         Patient's Medications   Discharge Prescriptions    No medications on file     No discharge procedures on file      ED Provider  Electronically Signed by           Dominga Mallory MD  07/18/19 1504

## 2019-07-18 NOTE — ED CARE HANDOFF
Emergency Department Sign Out Note        Sign out and transfer of care from Dr Mary Kay Day  See Separate Emergency Department note  The patient, Romy Ghosh, was evaluated by the previous provider for post op sore throat  Workup Completed:  Ct scan    ED Course / Workup Pending (followup):  Pending results  Will re-eval                              Procedures  MDM    Disposition  Final diagnoses:   None     ED Disposition     None      Follow-up Information    None       Patient's Medications   Discharge Prescriptions    No medications on file     No discharge procedures on file         ED Provider  Electronically Signed by     Pedro Painting MD  07/18/19 7357

## 2019-07-22 RX ORDER — CYANOCOBALAMIN 1000 UG/ML
1000 INJECTION INTRAMUSCULAR; SUBCUTANEOUS ONCE
Status: CANCELLED | OUTPATIENT
Start: 2019-07-25

## 2019-07-25 ENCOUNTER — HOSPITAL ENCOUNTER (OUTPATIENT)
Dept: INFUSION CENTER | Facility: HOSPITAL | Age: 49
Discharge: HOME | End: 2019-07-25
Attending: INTERNAL MEDICINE
Payer: COMMERCIAL

## 2019-07-25 DIAGNOSIS — E53.8 B12 DEFICIENCY: Primary | ICD-10-CM

## 2019-07-25 PROCEDURE — 96372 THER/PROPH/DIAG INJ SC/IM: CPT

## 2019-07-25 RX ORDER — CYANOCOBALAMIN 1000 UG/ML
1000 INJECTION INTRAMUSCULAR; SUBCUTANEOUS ONCE
Status: COMPLETED | OUTPATIENT
Start: 2019-07-25 | End: 2019-07-25

## 2019-07-25 RX ORDER — CYANOCOBALAMIN 1000 UG/ML
1000 INJECTION INTRAMUSCULAR; SUBCUTANEOUS ONCE
Status: CANCELLED | OUTPATIENT
Start: 2019-08-22

## 2019-07-25 RX ADMIN — CYANOCOBALAMIN 1000 MCG: 1000 INJECTION INTRAMUSCULAR; SUBCUTANEOUS at 11:32

## 2019-07-25 NOTE — PROGRESS NOTES
Pt here for monthly vit b12  Tolerated well to right deltoid without complications  Made next appt and aVS provided

## 2019-08-01 ENCOUNTER — OFFICE VISIT (OUTPATIENT)
Dept: FAMILY MEDICINE CLINIC | Facility: CLINIC | Age: 49
End: 2019-08-01

## 2019-08-01 VITALS
RESPIRATION RATE: 18 BRPM | TEMPERATURE: 98 F | OXYGEN SATURATION: 98 % | BODY MASS INDEX: 29.26 KG/M2 | HEIGHT: 62 IN | SYSTOLIC BLOOD PRESSURE: 122 MMHG | HEART RATE: 98 BPM | DIASTOLIC BLOOD PRESSURE: 70 MMHG | WEIGHT: 159 LBS

## 2019-08-01 DIAGNOSIS — Z12.4 ENCOUNTER FOR SCREENING FOR CERVICAL CANCER: ICD-10-CM

## 2019-08-01 DIAGNOSIS — H72.92 EAR DRUM PERFORATION, LEFT: ICD-10-CM

## 2019-08-01 DIAGNOSIS — L81.1 MELASMA: Primary | ICD-10-CM

## 2019-08-01 PROCEDURE — 99213 OFFICE O/P EST LOW 20 MIN: CPT | Performed by: FAMILY MEDICINE

## 2019-08-01 NOTE — PROGRESS NOTES
[facility]    Patient ID: Alfonso Reyes is a 50 y o  female  Reason for office visit: Evaluation of face skin change, ENT referral    /70 (BP Location: Right arm, Patient Position: Sitting, Cuff Size: Standard)   Pulse 98   Temp 98 °F (36 7 °C) (Temporal)   Resp 18   Ht 5' 2" (1 575 m)   Wt 72 1 kg (159 lb)   LMP 07/11/2019   SpO2 98%   BMI 29 08 kg/m²     Assessment/Plan  Melasma  -Reassured patient   -Referred to dermatology for further evaluation, input appreciated  L ear drum perforation  -No hearing for over 20 years in left ear  -ENT referral sent to Dr Gary Montejo as requested     Health Maintenance   -Women aged 27 to 72 years: Discussed USPSTF recommendation of screening every 3 years with cervical cytology alone, every 5 years with high-risk human papillomavirus (hrHPV) testing alone, or every 5 years with hrHPV testing in combination with cytology (cotesting)  -Appointment requested  Diagnoses and all orders for this visit:    Melasma  -     Ambulatory referral to Dermatology; Future    Ear drum perforation, left  -     Ambulatory Referral to Otolaryngology; Future    Encounter for screening for cervical cancer         HPI  -Information obtained from the patient   -Language: English    -Complains of B/L hyperpigmented spots on her cheeks for 6 months  -Started as small spots and continued to grow  -Denies inciting event, change in products, diet, sun exposure  -Tried vinegar application application at night for one month with no improvement  Social History  - reports that she has quit smoking  She has never used smokeless tobacco   - reports that she does not drink alcohol    - reports that she does not use drugs  Review of Systems   Constitutional: Negative for chills and fever  Respiratory: Negative for shortness of breath  Cardiovascular: Negative for chest pain  Gastrointestinal: Negative for nausea and vomiting     Pertinent items are noted in HPI  The following have been reviewed and updated: allergies and current medications    No Known Allergies  Current Outpatient Medications:     RA VITAMIN B-12 TR 1000 MCG TBCR, , Disp: , Rfl: 0  Patient Active Problem List   Diagnosis    Anxiety    Chronic cough    Essential hypertriglyceridemia    Hearing loss of left ear    Heart palpitations    Left knee pain    Low vitamin D level    Low back pain    Neck pain    Increased frequency of urination    Fatigue    Upper back pain    Need for hepatitis B vaccination    Iron deficiency anemia    B12 deficiency    Breast lump    Left arm numbness    Conductive hearing loss of left ear with unrestricted hearing of right ear    Perforation of left tympanic membrane    Hypertrophy of tonsils     Past Surgical History:   Procedure Laterality Date     SECTION      TONSILLECTOMY Bilateral 2019    Procedure: TONSILLECTOMY;  Surgeon: Dequan Arce MD;  Location: [address] [place] [address] OR;  Service: ENT    TUBAL LIGATION       Family History   Problem Relation Age of Onset    Diabetes Mother     Cancer Mother     Stroke Mother     Hypertension Mother     Lung cancer Mother     Hyperlipidemia Family      Health Maintenance   Topic Date Due    BMI: Followup Plan  1988    PAP SMEAR  2019    PT PLAN OF CARE  2019    INFLUENZA VACCINE  2019    MAMMOGRAM  2020    Depression Screening PHQ  2020    BMI: Adult  2020    DTaP,Tdap,and Td Vaccines (3 - Td) 2028    Pneumococcal Vaccine: 65+ Years (1 of 2 - PCV13) 2035    Pneumococcal Vaccine: Pediatrics (0 to 5 Years) and At-Risk Patients (6 to 59 Years)  Aged Out    HEPATITIS B VACCINES  Aged Out        Physical Exam   Constitutional: She is oriented to person, place, and time  She appears well-developed and well-nourished  No distress  HENT:   Head: Normocephalic and atraumatic     Left Ear: Ear canal normal    Left TM with suspected marginal perforation  Cardiovascular: Normal rate, regular rhythm and normal heart sounds  No murmur heard  Pulmonary/Chest: Effort normal and breath sounds normal  No respiratory distress  She has no wheezes  Neurological: She is alert and oriented to person, place, and time  Psychiatric: She has a normal mood and affect  Her behavior is normal    Nursing note and vitals reviewed

## 2019-08-13 ENCOUNTER — OFFICE VISIT (OUTPATIENT)
Dept: OBGYN CLINIC | Facility: CLINIC | Age: 49
End: 2019-08-13
Payer: COMMERCIAL

## 2019-08-13 VITALS
DIASTOLIC BLOOD PRESSURE: 80 MMHG | SYSTOLIC BLOOD PRESSURE: 126 MMHG | HEIGHT: 62 IN | WEIGHT: 161 LBS | BODY MASS INDEX: 29.63 KG/M2

## 2019-08-13 DIAGNOSIS — N92.0 MENORRHAGIA WITH REGULAR CYCLE: Primary | ICD-10-CM

## 2019-08-13 PROCEDURE — 87624 HPV HI-RISK TYP POOLED RSLT: CPT | Performed by: OBSTETRICS & GYNECOLOGY

## 2019-08-13 PROCEDURE — 99203 OFFICE O/P NEW LOW 30 MIN: CPT | Performed by: OBSTETRICS & GYNECOLOGY

## 2019-08-13 PROCEDURE — G0143 SCR C/V CYTO,THINLAYER,RESCR: HCPCS | Performed by: OBSTETRICS & GYNECOLOGY

## 2019-08-13 PROCEDURE — 88305 TISSUE EXAM BY PATHOLOGIST: CPT | Performed by: PATHOLOGY

## 2019-08-13 PROCEDURE — 58100 BIOPSY OF UTERUS LINING: CPT | Performed by: OBSTETRICS & GYNECOLOGY

## 2019-08-13 NOTE — PROGRESS NOTES
CC:   Heavy uterine bleeding    HPI: Sam Nath presents for evaluation of several months of very heavy uterine bleeding  The patient's cycles occur every 30 days, last for 6 days, but will have 3-4 days of bleeding where she will have to change a pad at least every hour to 2 hours  To the best of her knowledge she has never had abnormal Pap smears, history of fibroids, polyps or ovarian cysts  She takes no medications prescription are otherwise, that would aggravate this  She denies any pelvic pain or pressure in the pelvic area or problems urinating or having bowel movements  She had 3  sections, none of which had any complications  Past Medical History:  Past Medical History:   Diagnosis Date    Back pain     Chlamydia         Dental disease     Ear problems     Fatigue     Hyperlipidemia     no meds at present    Iron deficiency anemia     iron and B12 def, has had iron infusions    Nasal congestion     Palpitation     Sleep apnea     Tonsillitis     Varicella     Vitamin B12 deficiency     Vitamin D deficiency        Past Surgical History:  Past Surgical History:   Procedure Laterality Date     SECTION      TONSILLECTOMY Bilateral 2019    Procedure: TONSILLECTOMY;  Surgeon: Andre Keyes MD;  Location: [facility] MAIN OR;  Service: ENT    TUBAL LIGATION         Past OB/Gyn History:  Menstrual cycles as discussed under the HPI  ALLERGIES: No Known Allergies    MEDS:   Current Outpatient Medications:     RA VITAMIN B-12 TR 1000 MCG TBCR    Review of Systems:  Skin: No rashes or discolorations of any concern  RESP: Denies SOB, no cough  CV: Denies chest pain or palpitations  Breasts: Denies masses, pain, skin changes and nipple discharge  GI: Denies abdominal pain, heartburn, nausea, vomiting, changes in bowel habits  : Denies dysuria, frequency, CVA tenderness, incontinence and hematuria     Genitalia: Denies abnormal vaginal discharge, external lesions, rashes, pelvic pain, pressure, but positive for abnormal bleeding  Rectal:  Denies pain, bleeding, hemorrhoids,    Physical Exam:  /80 (BP Location: Left arm, Patient Position: Sitting, Cuff Size: Standard)   Ht 5' 2" (1 575 m)   Wt 73 kg (161 lb)   LMP 08/01/2019   BMI 29 45 kg/m²    Gen: The patient was alert and oriented x3, pleasant well-appearing female in no acute distress  Abd:  Soft, nontender, nondistended, no masses or organomegaly  Back:  No CVA tenderness, no tenderness to palpation along spine  Pelvic  Normal appearing external female genitalia, no visible lesions, no rashes  Vagina is free of discharge, normal vaginal epithelium, no abnormal  lesions, no evidence of prolapse anteriorly or posteriorly  Normal appearing cervix, mobile and nontender  A Pap smear is obtained  Uterus is normal size, mobile and, nontender  No palpable adnexal masses or tenderness  No anoperineal lesions  Skin:  No concerning lesions  Extremeties: No edema      Endometrial biopsy: With the patient's written consent, an endometrial sampling was performed under aseptic technique  The uterus is anteverted and sounds to almost 10 cm  A moderate amount of tissue was performed  The patient tolerated procedure well  The sample will be submitted for surgical pathology  Assessment & Plan:   1  Menorrhagia  Patient will return for a sonohysterogram and further discussion  At that time the endometrial sampling result should be available and further recommendations will be forthcoming

## 2019-08-15 LAB
HPV HR 12 DNA CVX QL NAA+PROBE: NEGATIVE
HPV16 DNA CVX QL NAA+PROBE: NEGATIVE
HPV18 DNA CVX QL NAA+PROBE: NEGATIVE

## 2019-08-19 LAB
LAB AP GYN PRIMARY INTERPRETATION: NORMAL
Lab: NORMAL

## 2019-08-22 ENCOUNTER — HOSPITAL ENCOUNTER (OUTPATIENT)
Dept: INFUSION CENTER | Facility: HOSPITAL | Age: 49
Discharge: HOME | End: 2019-08-22
Attending: INTERNAL MEDICINE
Payer: COMMERCIAL

## 2019-08-22 DIAGNOSIS — E53.8 B12 DEFICIENCY: Primary | ICD-10-CM

## 2019-08-22 PROCEDURE — 96372 THER/PROPH/DIAG INJ SC/IM: CPT

## 2019-08-22 RX ORDER — CYANOCOBALAMIN 1000 UG/ML
1000 INJECTION INTRAMUSCULAR; SUBCUTANEOUS ONCE
Status: CANCELLED | OUTPATIENT
Start: 2019-09-19

## 2019-08-22 RX ORDER — CYANOCOBALAMIN 1000 UG/ML
1000 INJECTION INTRAMUSCULAR; SUBCUTANEOUS ONCE
Status: COMPLETED | OUTPATIENT
Start: 2019-08-22 | End: 2019-08-22

## 2019-08-22 RX ADMIN — CYANOCOBALAMIN 1000 MCG: 1000 INJECTION INTRAMUSCULAR; SUBCUTANEOUS at 13:56

## 2019-08-22 NOTE — PROGRESS NOTES
PT ADMITTED TO INFUSION DEPARTMENT TODAY FOR VITAMIN B12 INJECTION  TOLERATED SAME WELL TO LEFT DELTOID  NO RECENT CHANGES IN HEALTH  SCHEDULED NEXT APPT   DISCHARGED AMBULATORY WITH AVS

## 2019-08-26 ENCOUNTER — PROCEDURE VISIT (OUTPATIENT)
Dept: OBGYN CLINIC | Facility: CLINIC | Age: 49
End: 2019-08-26
Payer: COMMERCIAL

## 2019-08-26 DIAGNOSIS — D25.1 LEIOMYOMA, INTRAMURAL: ICD-10-CM

## 2019-08-26 DIAGNOSIS — N92.0 MENORRHAGIA WITH REGULAR CYCLE: Primary | ICD-10-CM

## 2019-08-26 PROCEDURE — 76830 TRANSVAGINAL US NON-OB: CPT | Performed by: OBSTETRICS & GYNECOLOGY

## 2019-08-26 PROCEDURE — 76831 ECHO EXAM UTERUS: CPT | Performed by: OBSTETRICS & GYNECOLOGY

## 2019-08-26 PROCEDURE — 58340 CATHETER FOR HYSTEROGRAPHY: CPT | Performed by: OBSTETRICS & GYNECOLOGY

## 2019-08-26 RX ORDER — MEDROXYPROGESTERONE ACETATE 10 MG/1
TABLET ORAL
Qty: 15 TABLET | Refills: 1 | Status: SHIPPED | OUTPATIENT
Start: 2019-08-26 | End: 2019-09-30 | Stop reason: SDUPTHER

## 2019-08-26 NOTE — PROGRESS NOTES
AMB US Pelvic Non OB  Date/Time: 8/26/2019 8:27 AM  Performed by: Martha Ragsdale  Authorized by: Abdulkadir Oneil MD     Procedure details:     Indications: non-obstetric vaginal bleeding      Technique:  Transvaginal US, Non-OB    Position: lithotomy exam    Uterine findings:     Length (cm): 9 01    Height (cm):  7 72    Width (cm):  11 21    Endometrium thickness (mm):  14 8  Left ovary findings:     Left ovary:  Visualized    Length (cm): 4 98    Height (cm): 3 75    Width (cm): 4 83  Right ovary findings:     Right ovary:  Visualized    Length (cm): 2 36    Height (cm): 1 4    Width (cm): 2 07  Other findings:     Free pelvic fluid: not identified      Free peritoneal fluid: not identified    Post-Procedure Details:     Impression:  Anteverted uterus demonstrates multiple fibroids  Largest are anterior subserosal 3 0cm, posterior intramural most likely submucosal 3 7cm, left intramural fundal 3 1cm, and left subserosal 3 0cm  The right ovary appears within normal limits  The left ovary contains a complex cyst 4 5cm  No free fluid  Tolerance: Tolerated well, no immediate complications    Complications: no complications    Additional Procedure Comments:      Siemens Acuson X150 EC9-4 transvaginal transducer Serial # [id number] was used to perform the examination today and subsequently followed with high level disinfection utilizing Trophon EPR procedure  Ultrasound performed at:     [address] [address]  Phone:  [phone]  Fax:  [phone]  Sonohysterogram  Date/Time: 8/26/2019 8:28 AM  Performed by: Abdulkadir Oneil MD  Authorized by:  Abdulkadir Oneil MD     Consent:     Consent obtained:  Verbal and written    Consent given by:  Patient    Patient questions answered: yes      Patient agrees, verbalizes understanding, and wants to proceed: yes      Instructions and paperwork completed: yes    Pre-procedure:     Pre-procedure timeout performed: yes      Prepped with: Betadine    Procedure:     Cervix cleaned and prepped: yes      Catheter inserted: yes      Uterine cavity distended with saline: yes    Post-procedure:     Patient observed: yes      Post procedure instructions given to patient: yes      Patient tolerated procedure well with no complications: yes    Comments:      Sonohysterogram demonstrates one of the fibroids to have submucosal extension  The same fibroid also extends beyond the myometrium

## 2019-08-26 NOTE — PROGRESS NOTES
Alireza Garcia is seen today for sonohysterogram followed by discussion with respect to the findings  She has heavy uterine bleeding that is prolonged  Her endometrial biopsy was within normal limits  Her ultrasound finding showed multiple fibroids some of which protrude within the endometrial cavity  The fibroids that do protrude unfortunately only protrude approximately 20-30% within the cavity and to extend almost to the serosa  Because of this the patient only has several options  First of which is simply observation  The 2nd of which would be medication, namely Provera to try to slow down her bleeding, the 3rd would be a hysterectomy for which a supracervical with ovarian preservation would be advised  The patient and I over the course of 25 minutes discussed the pros and cons of surgery versus medication including the least the most common side effects of the medication  For now Alireza Garcia prefers to proceed with the medication root  She was given a 2 month prescription of progesterone to be taken daily for 15 days on and 15 days off  She will be seen back in 2 months to see how she is doing and she is to call with any side effects, questions or other problems

## 2019-09-19 ENCOUNTER — HOSPITAL ENCOUNTER (OUTPATIENT)
Dept: INFUSION CENTER | Facility: HOSPITAL | Age: 49
Discharge: HOME | End: 2019-09-19
Attending: INTERNAL MEDICINE
Payer: COMMERCIAL

## 2019-09-19 ENCOUNTER — TELEPHONE (OUTPATIENT)
Dept: HEMATOLOGY ONCOLOGY | Facility: CLINIC | Age: 49
End: 2019-09-19

## 2019-09-19 ENCOUNTER — APPOINTMENT (OUTPATIENT)
Dept: LAB | Facility: HOSPITAL | Age: 49
End: 2019-09-19
Payer: COMMERCIAL

## 2019-09-19 DIAGNOSIS — E53.8 B12 DEFICIENCY: Primary | ICD-10-CM

## 2019-09-19 DIAGNOSIS — D50.9 IRON DEFICIENCY ANEMIA, UNSPECIFIED IRON DEFICIENCY ANEMIA TYPE: ICD-10-CM

## 2019-09-19 DIAGNOSIS — E53.8 B12 DEFICIENCY: ICD-10-CM

## 2019-09-19 LAB
ALBUMIN SERPL BCP-MCNC: 4.3 G/DL (ref 3–5.2)
ALP SERPL-CCNC: 50 U/L (ref 43–122)
ALT SERPL W P-5'-P-CCNC: 20 U/L (ref 9–52)
ANION GAP SERPL CALCULATED.3IONS-SCNC: 7 MMOL/L (ref 5–14)
AST SERPL W P-5'-P-CCNC: 23 U/L (ref 14–36)
BASOPHILS # BLD AUTO: 0 THOUSANDS/ΜL (ref 0–0.1)
BASOPHILS NFR BLD AUTO: 1 % (ref 0–1)
BILIRUB SERPL-MCNC: 0.3 MG/DL
BUN SERPL-MCNC: 21 MG/DL (ref 5–25)
CALCIUM ALBUM COR SERPL-MCNC: 10.1 MG/DL (ref 8.3–10.1)
CALCIUM SERPL-MCNC: 10.3 MG/DL (ref 8.4–10.2)
CHLORIDE SERPL-SCNC: 101 MMOL/L (ref 97–108)
CO2 SERPL-SCNC: 30 MMOL/L (ref 22–30)
CREAT SERPL-MCNC: 0.82 MG/DL (ref 0.6–1.2)
CRP SERPL QL: 6.4 MG/L
EOSINOPHIL # BLD AUTO: 0.3 THOUSAND/ΜL (ref 0–0.4)
EOSINOPHIL NFR BLD AUTO: 4 % (ref 0–6)
ERYTHROCYTE [DISTWIDTH] IN BLOOD BY AUTOMATED COUNT: 13.7 %
ERYTHROCYTE [SEDIMENTATION RATE] IN BLOOD: 20 MM/HOUR (ref 0–20)
FERRITIN SERPL-MCNC: 4 NG/ML (ref 8–388)
GFR SERPL CREATININE-BSD FRML MDRD: 84 ML/MIN/1.73SQ M
GLUCOSE SERPL-MCNC: 95 MG/DL (ref 70–99)
HCT VFR BLD AUTO: 31.2 % (ref 36–46)
HGB BLD-MCNC: 10.4 G/DL (ref 12–16)
IRON SATN MFR SERPL: 6 %
IRON SERPL-MCNC: 27 UG/DL (ref 50–170)
LYMPHOCYTES # BLD AUTO: 2 THOUSANDS/ΜL (ref 0.5–4)
LYMPHOCYTES NFR BLD AUTO: 31 % (ref 25–45)
MCH RBC QN AUTO: 30.7 PG (ref 26–34)
MCHC RBC AUTO-ENTMCNC: 33.4 G/DL (ref 31–36)
MCV RBC AUTO: 92 FL (ref 80–100)
MONOCYTES # BLD AUTO: 0.6 THOUSAND/ΜL (ref 0.2–0.9)
MONOCYTES NFR BLD AUTO: 9 % (ref 1–10)
NEUTROPHILS # BLD AUTO: 3.5 THOUSANDS/ΜL (ref 1.8–7.8)
NEUTS SEG NFR BLD AUTO: 55 % (ref 45–65)
PLATELET # BLD AUTO: 379 THOUSANDS/UL (ref 150–450)
PMV BLD AUTO: 9.3 FL (ref 8.9–12.7)
POTASSIUM SERPL-SCNC: 4.3 MMOL/L (ref 3.6–5)
PROT SERPL-MCNC: 7.5 G/DL (ref 5.9–8.4)
RBC # BLD AUTO: 3.39 MILLION/UL (ref 4–5.2)
SODIUM SERPL-SCNC: 138 MMOL/L (ref 137–147)
TIBC SERPL-MCNC: 461 UG/DL (ref 250–450)
WBC # BLD AUTO: 6.3 THOUSAND/UL (ref 4.5–11)

## 2019-09-19 PROCEDURE — 85025 COMPLETE CBC W/AUTO DIFF WBC: CPT

## 2019-09-19 PROCEDURE — 82607 VITAMIN B-12: CPT

## 2019-09-19 PROCEDURE — 36415 COLL VENOUS BLD VENIPUNCTURE: CPT

## 2019-09-19 PROCEDURE — 82728 ASSAY OF FERRITIN: CPT

## 2019-09-19 PROCEDURE — 96372 THER/PROPH/DIAG INJ SC/IM: CPT

## 2019-09-19 PROCEDURE — 80053 COMPREHEN METABOLIC PANEL: CPT

## 2019-09-19 PROCEDURE — 85652 RBC SED RATE AUTOMATED: CPT

## 2019-09-19 PROCEDURE — 83550 IRON BINDING TEST: CPT

## 2019-09-19 PROCEDURE — 86140 C-REACTIVE PROTEIN: CPT

## 2019-09-19 PROCEDURE — 83540 ASSAY OF IRON: CPT

## 2019-09-19 RX ORDER — CYANOCOBALAMIN 1000 UG/ML
1000 INJECTION INTRAMUSCULAR; SUBCUTANEOUS ONCE
Status: CANCELLED | OUTPATIENT
Start: 2019-10-17

## 2019-09-19 RX ORDER — CYANOCOBALAMIN 1000 UG/ML
1000 INJECTION INTRAMUSCULAR; SUBCUTANEOUS ONCE
Status: COMPLETED | OUTPATIENT
Start: 2019-09-19 | End: 2019-09-19

## 2019-09-19 RX ADMIN — CYANOCOBALAMIN 1000 MCG: 1000 INJECTION INTRAMUSCULAR; SUBCUTANEOUS at 14:04

## 2019-09-20 LAB — VIT B12 SERPL-MCNC: >6000 PG/ML (ref 100–900)

## 2019-09-25 ENCOUNTER — TELEPHONE (OUTPATIENT)
Dept: HEMATOLOGY ONCOLOGY | Facility: CLINIC | Age: 49
End: 2019-09-25

## 2019-09-25 NOTE — TELEPHONE ENCOUNTER
Called patient per Hasmukh Floyd, to see if pt would like to be seen sooner than scheduled f/u apt on 10/11 as her iron is low  Pt rescheduled her apt to Friday 9/27 at 3:30 w/KALIA Owens

## 2019-09-27 ENCOUNTER — OFFICE VISIT (OUTPATIENT)
Dept: HEMATOLOGY ONCOLOGY | Facility: CLINIC | Age: 49
End: 2019-09-27
Payer: COMMERCIAL

## 2019-09-27 VITALS
SYSTOLIC BLOOD PRESSURE: 120 MMHG | DIASTOLIC BLOOD PRESSURE: 80 MMHG | TEMPERATURE: 97.8 F | WEIGHT: 157.8 LBS | HEIGHT: 62 IN | HEART RATE: 69 BPM | BODY MASS INDEX: 29.04 KG/M2 | RESPIRATION RATE: 16 BRPM | OXYGEN SATURATION: 98 %

## 2019-09-27 DIAGNOSIS — E53.8 B12 DEFICIENCY: ICD-10-CM

## 2019-09-27 DIAGNOSIS — D50.9 IRON DEFICIENCY ANEMIA, UNSPECIFIED IRON DEFICIENCY ANEMIA TYPE: Primary | ICD-10-CM

## 2019-09-27 PROCEDURE — 99214 OFFICE O/P EST MOD 30 MIN: CPT | Performed by: NURSE PRACTITIONER

## 2019-09-27 RX ORDER — SODIUM CHLORIDE 9 MG/ML
20 INJECTION, SOLUTION INTRAVENOUS ONCE
Status: CANCELLED | OUTPATIENT
Start: 2019-10-03

## 2019-09-27 NOTE — PROGRESS NOTES
Hematology/Oncology Outpatient Follow-up  Kaitlin Forrest 52 y o  female 1970 [id number]    Date:  9/27/2019      Assessment and Plan:  1  Iron deficiency anemia, unspecified iron deficiency anemia type  The patient continues to have anemia and iron deficiency which is is likely due to her extremely heavy menstrual cycles  She was encouraged to continue to follow-up with her GYN team and their recommendations regarding her uterine fibroids/menorrhagia  The patient will be treated with another course of iron IV Injectafer 750 mg weekly x2  She has been treated with Injectafer in the past and tolerated it well  Patient will follow up again in about 4 months with repeat laboratory studies prior     - CBC and differential; Future  - Comprehensive metabolic panel; Future  - Iron Panel (Includes Ferritin, Iron Sat%, Iron, and TIBC); Future  - Ferritin; Future  - Vitamin B12; Future    2  B12 deficiency  She will be considered on her B12 injections for now  The elevated level is likely due to the fact that she had her lab work done shortly after the injection  If she continues to have significant elevation of her B12 in the future we can consider discontinuing them  - Vitamin B12; Future      HPI:  This is a 72-year-old female with a history of borderline diabetes, heavy menstrual cycle and iron deficiency anemia  The patient has blood work on 1/23/19 which showed hemoglobin level of 8 5 with MCV of 80 her white cells were within normal range with slightly above normal platelet count   Her iron panel showed significant iron deficiency with ferritin of 3 and low vitamin B12 level of 288  Patient was given 2 doses of IV iron Injectafer and 2 vitamin B12 injections in February of 2019 which corrected her iron deficiency anemia        Interval history:  Patient presents today for a follow-up visit  She had a tonsillectomy done on 07/11/2019    She continues to report heavy menstrual cycles monthly; her menses last about 6 days total the 1st 4 days are extremely heavy with clots to the point she is having to change her pad every hour  She is being worked up and followed closely by her GYN team; she had transvaginal ultrasound and sonohysterogram done recently which showed anteverted uterus and multiple fibroids  The patient was started on Provera and is considering hysterectomy in the near future  She continues to get her vitamin B12 injections on a monthly basis in the infusion Department  Reports fatigue, headaches increased dyspnea on exertion  Denies Pica, easy bruising or bleeding from any site other than her menstrual cycles  Patient's most recent laboratory studies from 19th of September showed normal WBC and platelets, she is anemic H&H 10 4/31 2, MCV 92  Laboratory markers and CMP are normal   B12 is elevated greater than 6000 but she did have her B12 injection that day prior to the labs  She is again iron deficient transferrin saturation 6%, elevated TIBC 461, low serum iron 27 and ferritin 4  ROS: Review of Systems   Constitutional: Positive for fatigue  Negative for activity change, appetite change, chills, fever and unexpected weight change  HENT: Negative for congestion, mouth sores, nosebleeds, sore throat and trouble swallowing  Eyes: Negative  Respiratory: Positive for shortness of breath  Negative for cough and chest tightness  Cardiovascular: Negative for chest pain, palpitations and leg swelling  Gastrointestinal: Positive for nausea (mild)  Negative for abdominal distention, abdominal pain, blood in stool, constipation, diarrhea and vomiting  Genitourinary: Positive for menstrual problem  Negative for difficulty urinating, dysuria, frequency, hematuria and urgency  Musculoskeletal: Negative for arthralgias, back pain, gait problem, joint swelling and myalgias  Skin: Positive for rash  Negative for color change and pallor  Neurological: Positive for headaches  Negative for dizziness, weakness, light-headedness and numbness  Hematological: Negative for adenopathy  Does not bruise/bleed easily  Psychiatric/Behavioral: Negative for dysphoric mood and sleep disturbance  The patient is not nervous/anxious          Past Medical History:   Diagnosis Date    Back pain     Chlamydia     2005    Dental disease     Ear problems     Fatigue     Hyperlipidemia     no meds at present    Iron deficiency anemia     iron and B12 def, has had iron infusions    Nasal congestion     Palpitation     Sleep apnea     Tonsillitis     Varicella     Vitamin B12 deficiency     Vitamin D deficiency        Past Surgical History:   Procedure Laterality Date     SECTION      TONSILLECTOMY Bilateral 2019    Procedure: TONSILLECTOMY;  Surgeon: Lance Chun MD;  Location: [address] [place] [address];  Service: ENT    TUBAL LIGATION         Social History     Socioeconomic History    Marital status: Single     Spouse name: None    Number of children: None    Years of education: None    Highest education level: None   Occupational History    None   Social Needs    Financial resource strain: None    Food insecurity:     Worry: None     Inability: None    Transportation needs:     Medical: None     Non-medical: None   Tobacco Use    Smoking status: Former Smoker    Smokeless tobacco: Never Used   Substance and Sexual Activity    Alcohol use: No     Comment: social    Drug use: No    Sexual activity: Yes     Partners: Male   Lifestyle    Physical activity:     Days per week: None     Minutes per session: None    Stress: None   Relationships    Social connections:     Talks on phone: None     Gets together: None     Attends [employer] service: None     Active member of club or organization: None     Attends meetings of clubs or organizations: None     Relationship status: None    Intimate partner violence:     Fear of current or ex partner: None     Emotionally abused: None Physically abused: None     Forced sexual activity: None   Other Topics Concern    None   Social History Narrative    Caffeine use    Uses safety equipment seatbelt       Family History   Problem Relation Age of Onset    Diabetes Mother     Cancer Mother     Stroke Mother     Hypertension Mother     Lung cancer Mother     Hyperlipidemia Family        No Known Allergies      Current Outpatient Medications:     medroxyPROGESTERone (PROVERA) 10 mg tablet, Tablets to be used daily, for 15 days, then 15 days off  Then repeat for next month , Disp: 15 tablet, Rfl: 1    RA VITAMIN B-12 TR 1000 MCG TBCR, , Disp: , Rfl: 0      Physical Exam:  /80 (BP Location: Left arm, Cuff Size: Adult)   Pulse 69   Temp 97 8 °F (36 6 °C) (Tympanic)   Resp 16   Ht 5' 2" (1 575 m)   Wt 71 6 kg (157 lb 12 8 oz)   SpO2 98%   BMI 28 86 kg/m²     Physical Exam   Constitutional: She is oriented to person, place, and time  She appears well-developed and well-nourished  No distress  HENT:   Head: Normocephalic and atraumatic  Mouth/Throat: Oropharynx is clear and moist  No oropharyngeal exudate  Eyes: Pupils are equal, round, and reactive to light  Conjunctivae are normal  No scleral icterus  Neck: Normal range of motion  Neck supple  No thyromegaly present  Cardiovascular: Normal rate, regular rhythm, normal heart sounds and intact distal pulses  No murmur heard  Pulmonary/Chest: Effort normal and breath sounds normal  No respiratory distress  Abdominal: Soft  Bowel sounds are normal  She exhibits no distension  There is no hepatosplenomegaly  There is no tenderness  Musculoskeletal: Normal range of motion  She exhibits no edema  Lymphadenopathy:     She has no cervical adenopathy  She has no axillary adenopathy  Neurological: She is alert and oriented to person, place, and time  Skin: Skin is warm and dry  No rash noted  She is not diaphoretic  No erythema  No pallor     Psychiatric: She has a normal mood and affect  Her behavior is normal  Judgment and thought content normal    Vitals reviewed  Labs:  Lab Results   Component Value Date    WBC 6 30 09/19/2019    HGB 10 4 (L) 09/19/2019    HCT 31 2 (L) 09/19/2019    MCV 92 09/19/2019     09/19/2019     Lab Results   Component Value Date     08/25/2015    K 4 3 09/19/2019     09/19/2019    CO2 30 09/19/2019    ANIONGAP 8 08/25/2015    BUN 21 09/19/2019    CREATININE 0 82 09/19/2019    GLUCOSE 88 08/25/2015    GLUF 90 07/03/2019    CALCIUM 10 3 (H) 09/19/2019    CORRECTEDCA 10 1 09/19/2019    AST 23 09/19/2019    ALT 20 09/19/2019    ALKPHOS 50 09/19/2019    PROT 7 3 08/25/2015    BILITOT 0 28 08/25/2015    EGFR 84 09/19/2019       Patient voiced understanding and agreement in the above discussion  Aware to contact our office with questions/symptoms in the interim

## 2019-09-30 DIAGNOSIS — N92.0 MENORRHAGIA WITH REGULAR CYCLE: ICD-10-CM

## 2019-10-01 RX ORDER — MEDROXYPROGESTERONE ACETATE 10 MG/1
TABLET ORAL
Qty: 15 TABLET | Refills: 0 | Status: SHIPPED | OUTPATIENT
Start: 2019-10-01 | End: 2020-09-25

## 2019-10-03 ENCOUNTER — OFFICE VISIT (OUTPATIENT)
Dept: FAMILY MEDICINE CLINIC | Facility: CLINIC | Age: 49
End: 2019-10-03

## 2019-10-03 ENCOUNTER — HOSPITAL ENCOUNTER (OUTPATIENT)
Dept: INFUSION CENTER | Facility: HOSPITAL | Age: 49
Discharge: HOME | End: 2019-10-03
Attending: INTERNAL MEDICINE
Payer: COMMERCIAL

## 2019-10-03 VITALS
DIASTOLIC BLOOD PRESSURE: 63 MMHG | HEART RATE: 68 BPM | RESPIRATION RATE: 16 BRPM | SYSTOLIC BLOOD PRESSURE: 137 MMHG | TEMPERATURE: 97 F

## 2019-10-03 VITALS
TEMPERATURE: 96.5 F | RESPIRATION RATE: 16 BRPM | HEART RATE: 67 BPM | WEIGHT: 156.9 LBS | SYSTOLIC BLOOD PRESSURE: 124 MMHG | DIASTOLIC BLOOD PRESSURE: 70 MMHG | BODY MASS INDEX: 28.87 KG/M2 | HEIGHT: 62 IN | OXYGEN SATURATION: 99 %

## 2019-10-03 DIAGNOSIS — Z23 NEED FOR INFLUENZA VACCINATION: Primary | ICD-10-CM

## 2019-10-03 DIAGNOSIS — Z02.89 ENCOUNTER FOR PHYSICAL EXAMINATION RELATED TO EMPLOYMENT: ICD-10-CM

## 2019-10-03 DIAGNOSIS — D50.0 IRON DEFICIENCY ANEMIA DUE TO CHRONIC BLOOD LOSS: Primary | ICD-10-CM

## 2019-10-03 PROBLEM — J35.1 HYPERTROPHY OF TONSILS: Status: RESOLVED | Noted: 2019-07-11 | Resolved: 2019-10-03

## 2019-10-03 PROCEDURE — 3008F BODY MASS INDEX DOCD: CPT | Performed by: PHYSICIAN ASSISTANT

## 2019-10-03 PROCEDURE — 99213 OFFICE O/P EST LOW 20 MIN: CPT | Performed by: PHYSICIAN ASSISTANT

## 2019-10-03 PROCEDURE — 90682 RIV4 VACC RECOMBINANT DNA IM: CPT | Performed by: PHYSICIAN ASSISTANT

## 2019-10-03 PROCEDURE — 90471 IMMUNIZATION ADMIN: CPT | Performed by: PHYSICIAN ASSISTANT

## 2019-10-03 PROCEDURE — 96365 THER/PROPH/DIAG IV INF INIT: CPT

## 2019-10-03 RX ORDER — SODIUM CHLORIDE 9 MG/ML
20 INJECTION, SOLUTION INTRAVENOUS ONCE
Status: COMPLETED | OUTPATIENT
Start: 2019-10-03 | End: 2019-10-03

## 2019-10-03 RX ORDER — ERGOCALCIFEROL 1.25 MG/1
CAPSULE ORAL
Refills: 0 | COMMUNITY
Start: 2019-09-20 | End: 2021-02-25

## 2019-10-03 RX ORDER — SODIUM CHLORIDE 9 MG/ML
20 INJECTION, SOLUTION INTRAVENOUS ONCE
Status: CANCELLED | OUTPATIENT
Start: 2019-10-10

## 2019-10-03 RX ADMIN — SODIUM CHLORIDE 20 ML/HR: 9 INJECTION, SOLUTION INTRAVENOUS at 15:12

## 2019-10-03 RX ADMIN — FERRIC CARBOXYMALTOSE INJECTION 750 MG: 50 INJECTION, SOLUTION INTRAVENOUS at 15:12

## 2019-10-03 NOTE — PROGRESS NOTES
Pt tolerated infusion well  No adverse reaction noted after 30 minutes of observation  Iv discontinued   Dc pt to home with avs

## 2019-10-03 NOTE — PROGRESS NOTES
Assessment/Plan:    No problem-specific Assessment & Plan notes found for this encounter  Problem List Items Addressed This Visit     None      Visit Diagnoses     Need for influenza vaccination    -  Primary    Relevant Orders    influenza vaccine, [id number], quadrivalent, recombinant, PF, 0 5 mL, for patients 18 yr+ (FLUBLOK) (Completed)    Encounter for physical examination related to employment              Employment physical signed today and will  Here on Monday to have PPD placed  Subjective:      Patient ID: Moises Braxton is a 52 y o  female  HPI   59-year-old female here for physical for work  She is seeing Hematology for iron deficiency anemia is feeling seeing gynecology for menorrhagia  She currently is on her period now and she does feel tired  She is going for an iron infusion today day  The following portions of the patient's history were reviewed and updated as appropriate:   She  has a past medical history of Back pain, Chlamydia, Dental disease, Ear problems, Fatigue, Hyperlipidemia, Iron deficiency anemia, Nasal congestion, Palpitation, Sleep apnea, Tonsillitis, Varicella, Vitamin B12 deficiency, and Vitamin D deficiency    She   Patient Active Problem List    Diagnosis Date Noted    Conductive hearing loss of left ear with unrestricted hearing of right ear 06/10/2019    Perforation of left tympanic membrane 06/10/2019    Left arm numbness 02/11/2019    Iron deficiency anemia 02/20/2018    B12 deficiency 02/20/2018    Fatigue 02/01/2018    Upper back pain 02/01/2018    Need for hepatitis B vaccination 02/01/2018    Breast lump 10/23/2017    Chronic cough 10/19/2017    Hearing loss of left ear 10/19/2017    Heart palpitations 10/19/2017    Increased frequency of urination 10/19/2017    Low back pain 12/13/2016    Low vitamin D level 10/17/2016    Left knee pain 10/14/2016    Neck pain 12/22/2015    Essential hypertriglyceridemia 10/07/2015    Anxiety 2014     She  has a past surgical history that includes  section; Tubal ligation; and TONSILLECTOMY (Bilateral, 2019)  Her family history includes Cancer in her mother; Diabetes in her mother; Hyperlipidemia in her family; Hypertension in her mother; Lung cancer in her mother; Stroke in her mother  She  reports that she quit smoking about 4 years ago  She has never used smokeless tobacco  She reports that she drank alcohol  She reports that she has current or past drug history  Drug: Marijuana  Current Outpatient Medications   Medication Sig Dispense Refill    ergocalciferol (VITAMIN D2) 50,000 units   0    medroxyPROGESTERone (PROVERA) 10 mg tablet TAKE 1 TABLET DAILY FOR 15 DAYS THEN 15 DAYS OFF THEN REPEAT FOR NEXT MONTH 15 tablet 0    RA VITAMIN B-12 TR 1000 MCG TBCR   0     No current facility-administered medications for this visit  Current Outpatient Medications on File Prior to Visit   Medication Sig    ergocalciferol (VITAMIN D2) 50,000 units     medroxyPROGESTERone (PROVERA) 10 mg tablet TAKE 1 TABLET DAILY FOR 15 DAYS THEN 15 DAYS OFF THEN REPEAT FOR NEXT MONTH    RA VITAMIN B-12 TR 1000 MCG TBCR      Current Facility-Administered Medications on File Prior to Visit   Medication    [COMPLETED] ferric carboxymaltose (INJECTAFER) 750 mg in sodium chloride 0 9 % 250 mL infusion    [COMPLETED] sodium chloride 0 9 % infusion     She has No Known Allergies       Review of Systems   Constitutional: Positive for fatigue  Negative for activity change, appetite change, chills and unexpected weight change  HENT: Negative for dental problem, ear pain, hearing loss and sore throat  Eyes: Negative for visual disturbance  Respiratory: Negative for cough and wheezing  Cardiovascular: Negative for chest pain  Gastrointestinal: Negative for vomiting  Genitourinary: Positive for menstrual problem, pelvic pain (cramping with period today) and vaginal bleeding   Negative for difficulty urinating and dysuria  Musculoskeletal: Negative for back pain and myalgias  Skin: Negative for rash  Neurological: Negative for dizziness, seizures and headaches  Psychiatric/Behavioral: Negative for behavioral problems  Objective:      /70 (BP Location: Right arm, Patient Position: Sitting, Cuff Size: Adult)   Pulse 67   Temp (!) 96 5 °F (35 8 °C) (Tympanic)   Resp 16   Ht 5' 1 5" (1 562 m)   Wt 71 2 kg (156 lb 14 4 oz)   LMP 09/28/2019 (Exact Date)   SpO2 99%   Breastfeeding? No   BMI 29 17 kg/m²          Physical Exam   Constitutional: She is oriented to person, place, and time  She appears well-developed and well-nourished  HENT:   Head: Normocephalic and atraumatic  Right Ear: External ear normal    Left Ear: External ear normal    Nose: Nose normal    Mouth/Throat: Oropharynx is clear and moist    Eyes: Conjunctivae are normal    Neck: Normal range of motion  Neck supple  No thyromegaly present  Cardiovascular: Normal rate, regular rhythm and normal heart sounds  No murmur heard  Pulmonary/Chest: Effort normal and breath sounds normal  No respiratory distress  Abdominal: Soft  Bowel sounds are normal  She exhibits no mass  There is no tenderness  There is no guarding  Musculoskeletal: Normal range of motion  Lymphadenopathy:     She has no cervical adenopathy  Neurological: She is alert and oriented to person, place, and time  Skin: Skin is warm  Psychiatric: She has a normal mood and affect  Her behavior is normal    Nursing note and vitals reviewed

## 2019-10-10 ENCOUNTER — HOSPITAL ENCOUNTER (OUTPATIENT)
Dept: INFUSION CENTER | Facility: HOSPITAL | Age: 49
Discharge: HOME | End: 2019-10-10
Attending: INTERNAL MEDICINE
Payer: COMMERCIAL

## 2019-10-10 VITALS
HEART RATE: 80 BPM | TEMPERATURE: 98 F | RESPIRATION RATE: 16 BRPM | DIASTOLIC BLOOD PRESSURE: 80 MMHG | SYSTOLIC BLOOD PRESSURE: 143 MMHG

## 2019-10-10 DIAGNOSIS — E53.8 B12 DEFICIENCY: ICD-10-CM

## 2019-10-10 DIAGNOSIS — D50.0 IRON DEFICIENCY ANEMIA DUE TO CHRONIC BLOOD LOSS: Primary | ICD-10-CM

## 2019-10-10 PROCEDURE — 96372 THER/PROPH/DIAG INJ SC/IM: CPT

## 2019-10-10 PROCEDURE — 96365 THER/PROPH/DIAG IV INF INIT: CPT

## 2019-10-10 RX ORDER — CYANOCOBALAMIN 1000 UG/ML
1000 INJECTION INTRAMUSCULAR; SUBCUTANEOUS ONCE
Status: COMPLETED | OUTPATIENT
Start: 2019-10-10 | End: 2019-10-10

## 2019-10-10 RX ORDER — SODIUM CHLORIDE 9 MG/ML
20 INJECTION, SOLUTION INTRAVENOUS ONCE
Status: COMPLETED | OUTPATIENT
Start: 2019-10-10 | End: 2019-10-10

## 2019-10-10 RX ORDER — CYANOCOBALAMIN 1000 UG/ML
1000 INJECTION INTRAMUSCULAR; SUBCUTANEOUS ONCE
Status: CANCELLED | OUTPATIENT
Start: 2019-11-14

## 2019-10-10 RX ORDER — SODIUM CHLORIDE 9 MG/ML
20 INJECTION, SOLUTION INTRAVENOUS ONCE
Status: CANCELLED | OUTPATIENT
Start: 2019-10-10

## 2019-10-10 RX ADMIN — SODIUM CHLORIDE 20 ML/HR: 9 INJECTION, SOLUTION INTRAVENOUS at 15:23

## 2019-10-10 RX ADMIN — FERRIC CARBOXYMALTOSE INJECTION 750 MG: 50 INJECTION, SOLUTION INTRAVENOUS at 15:38

## 2019-10-10 RX ADMIN — CYANOCOBALAMIN 1000 MCG: 1000 INJECTION INTRAMUSCULAR; SUBCUTANEOUS at 15:24

## 2019-10-10 NOTE — PROGRESS NOTES
Pt tolerated injectofer dose well  No adverse reactions noted  Peripheral iv discontinued jelco intact   Discharged ambulatory with avs

## 2019-10-17 ENCOUNTER — HOSPITAL ENCOUNTER (OUTPATIENT)
Dept: INFUSION CENTER | Facility: HOSPITAL | Age: 49
Discharge: HOME | End: 2019-10-17
Attending: INTERNAL MEDICINE

## 2019-10-26 DIAGNOSIS — R79.89 LOW VITAMIN D LEVEL: ICD-10-CM

## 2019-10-27 RX ORDER — ERGOCALCIFEROL 1.25 MG/1
CAPSULE ORAL
Qty: 4 CAPSULE | Refills: 5 | OUTPATIENT
Start: 2019-10-27

## 2020-02-27 ENCOUNTER — APPOINTMENT (OUTPATIENT)
Dept: LAB | Facility: HOSPITAL | Age: 50
End: 2020-02-27
Attending: INTERNAL MEDICINE
Payer: COMMERCIAL

## 2020-02-27 DIAGNOSIS — D50.9 IRON DEFICIENCY ANEMIA, UNSPECIFIED IRON DEFICIENCY ANEMIA TYPE: ICD-10-CM

## 2020-02-27 DIAGNOSIS — E53.8 B12 DEFICIENCY: ICD-10-CM

## 2020-02-27 DIAGNOSIS — D50.9 IRON DEFICIENCY ANEMIA, UNSPECIFIED IRON DEFICIENCY ANEMIA TYPE: Primary | ICD-10-CM

## 2020-02-27 LAB
ALBUMIN SERPL BCP-MCNC: 4.3 G/DL (ref 3–5.2)
ALP SERPL-CCNC: 46 U/L (ref 43–122)
ALT SERPL W P-5'-P-CCNC: 21 U/L (ref 9–52)
ANION GAP SERPL CALCULATED.3IONS-SCNC: 7 MMOL/L (ref 5–14)
AST SERPL W P-5'-P-CCNC: 18 U/L (ref 14–36)
BASOPHILS # BLD AUTO: 0 THOUSANDS/ΜL (ref 0–0.1)
BASOPHILS NFR BLD AUTO: 1 % (ref 0–1)
BILIRUB SERPL-MCNC: 0.4 MG/DL
BUN SERPL-MCNC: 16 MG/DL (ref 5–25)
CALCIUM SERPL-MCNC: 9.3 MG/DL (ref 8.4–10.2)
CHLORIDE SERPL-SCNC: 104 MMOL/L (ref 97–108)
CO2 SERPL-SCNC: 27 MMOL/L (ref 22–30)
CREAT SERPL-MCNC: 0.61 MG/DL (ref 0.6–1.2)
EOSINOPHIL # BLD AUTO: 0.1 THOUSAND/ΜL (ref 0–0.4)
EOSINOPHIL NFR BLD AUTO: 2 % (ref 0–6)
ERYTHROCYTE [DISTWIDTH] IN BLOOD BY AUTOMATED COUNT: 13.4 %
FERRITIN SERPL-MCNC: 24 NG/ML (ref 8–388)
GFR SERPL CREATININE-BSD FRML MDRD: 107 ML/MIN/1.73SQ M
GLUCOSE P FAST SERPL-MCNC: 87 MG/DL (ref 70–99)
HCT VFR BLD AUTO: 37.1 % (ref 36–46)
HGB BLD-MCNC: 12.5 G/DL (ref 12–16)
IRON SATN MFR SERPL: 12 %
IRON SERPL-MCNC: 52 UG/DL (ref 50–170)
LYMPHOCYTES # BLD AUTO: 1.4 THOUSANDS/ΜL (ref 0.5–4)
LYMPHOCYTES NFR BLD AUTO: 27 % (ref 25–45)
MCH RBC QN AUTO: 32.9 PG (ref 26–34)
MCHC RBC AUTO-ENTMCNC: 33.8 G/DL (ref 31–36)
MCV RBC AUTO: 98 FL (ref 80–100)
MONOCYTES # BLD AUTO: 0.4 THOUSAND/ΜL (ref 0.2–0.9)
MONOCYTES NFR BLD AUTO: 8 % (ref 1–10)
NEUTROPHILS # BLD AUTO: 3.1 THOUSANDS/ΜL (ref 1.8–7.8)
NEUTS SEG NFR BLD AUTO: 62 % (ref 45–65)
PLATELET # BLD AUTO: 324 THOUSANDS/UL (ref 150–450)
PMV BLD AUTO: 9.3 FL (ref 8.9–12.7)
POTASSIUM SERPL-SCNC: 4.3 MMOL/L (ref 3.6–5)
PROT SERPL-MCNC: 7.7 G/DL (ref 5.9–8.4)
RBC # BLD AUTO: 3.8 MILLION/UL (ref 4–5.2)
SODIUM SERPL-SCNC: 138 MMOL/L (ref 137–147)
TIBC SERPL-MCNC: 421 UG/DL (ref 250–450)
VIT B12 SERPL-MCNC: 339 PG/ML (ref 100–900)
WBC # BLD AUTO: 5 THOUSAND/UL (ref 4.5–11)

## 2020-02-27 PROCEDURE — 80053 COMPREHEN METABOLIC PANEL: CPT

## 2020-02-27 PROCEDURE — 36415 COLL VENOUS BLD VENIPUNCTURE: CPT

## 2020-02-27 PROCEDURE — 83550 IRON BINDING TEST: CPT

## 2020-02-27 PROCEDURE — 83540 ASSAY OF IRON: CPT

## 2020-02-27 PROCEDURE — 82728 ASSAY OF FERRITIN: CPT

## 2020-02-27 PROCEDURE — 82607 VITAMIN B-12: CPT

## 2020-02-27 PROCEDURE — 85025 COMPLETE CBC W/AUTO DIFF WBC: CPT

## 2020-03-09 ENCOUNTER — TELEPHONE (OUTPATIENT)
Dept: HEMATOLOGY ONCOLOGY | Facility: CLINIC | Age: 50
End: 2020-03-09

## 2020-03-12 ENCOUNTER — OFFICE VISIT (OUTPATIENT)
Dept: FAMILY MEDICINE CLINIC | Facility: CLINIC | Age: 50
End: 2020-03-12

## 2020-03-12 VITALS
DIASTOLIC BLOOD PRESSURE: 90 MMHG | HEIGHT: 62 IN | OXYGEN SATURATION: 98 % | TEMPERATURE: 98 F | BODY MASS INDEX: 30.36 KG/M2 | WEIGHT: 165 LBS | SYSTOLIC BLOOD PRESSURE: 150 MMHG | RESPIRATION RATE: 16 BRPM | HEART RATE: 65 BPM

## 2020-03-12 DIAGNOSIS — B37.9 YEAST INFECTION: ICD-10-CM

## 2020-03-12 DIAGNOSIS — E66.09 CLASS 1 OBESITY DUE TO EXCESS CALORIES WITHOUT SERIOUS COMORBIDITY WITH BODY MASS INDEX (BMI) OF 30.0 TO 30.9 IN ADULT: ICD-10-CM

## 2020-03-12 DIAGNOSIS — J11.1 INFLUENZA-LIKE ILLNESS: Primary | ICD-10-CM

## 2020-03-12 DIAGNOSIS — Z12.31 BREAST CANCER SCREENING BY MAMMOGRAM: ICD-10-CM

## 2020-03-12 PROCEDURE — 1036F TOBACCO NON-USER: CPT | Performed by: PHYSICIAN ASSISTANT

## 2020-03-12 PROCEDURE — 87631 RESP VIRUS 3-5 TARGETS: CPT | Performed by: PHYSICIAN ASSISTANT

## 2020-03-12 PROCEDURE — 99213 OFFICE O/P EST LOW 20 MIN: CPT | Performed by: PHYSICIAN ASSISTANT

## 2020-03-12 PROCEDURE — 3008F BODY MASS INDEX DOCD: CPT | Performed by: PHYSICIAN ASSISTANT

## 2020-03-12 RX ORDER — FLUCONAZOLE 150 MG/1
150 TABLET ORAL ONCE
Qty: 1 TABLET | Refills: 0 | Status: SHIPPED | OUTPATIENT
Start: 2020-03-12 | End: 2020-03-12

## 2020-03-12 RX ORDER — OSELTAMIVIR PHOSPHATE 75 MG/1
75 CAPSULE ORAL 2 TIMES DAILY
Qty: 10 CAPSULE | Refills: 0 | Status: SHIPPED | OUTPATIENT
Start: 2020-03-12 | End: 2020-03-17

## 2020-03-12 NOTE — LETTER
March 12, 2020     Patient: Jae Carballo   YOB: 1970   Date of Visit: 3/12/2020       To Whom it May Concern:    Jae Carballo is under my professional care  She was seen in my office on 3/12/2020  She may return to work on 3/14/2020  If you have any questions or concerns, please don't hesitate to call           Sincerely,          Riley Leslie PA-C        CC: No Recipients

## 2020-03-12 NOTE — PROGRESS NOTES
Assessment/Plan:    Class 1 obesity due to excess calories without serious comorbidity with body mass index (BMI) of 30 0 to 30 9 in adult  BMI Counseling: Body mass index is 30 67 kg/m²  The BMI is above normal  Nutrition recommendations include 3-5 servings of fruits/vegetables daily  No follow-ups on file  There are no Patient Instructions on file for this visit  Problem List Items Addressed This Visit        Other    Class 1 obesity due to excess calories without serious comorbidity with body mass index (BMI) of 30 0 to 30 9 in adult     BMI Counseling: Body mass index is 30 67 kg/m²  The BMI is above normal  Nutrition recommendations include 3-5 servings of fruits/vegetables daily  Other Visit Diagnoses     Influenza-like illness    -  Primary    Relevant Medications    oseltamivir (TAMIFLU) 75 mg capsule    Other Relevant Orders    Influenza A/B and RSV PCR (Completed)    Yeast infection        Breast cancer screening by mammogram        Relevant Orders    Mammo screening bilateral w cad          To flu-like symptoms recommend starting on Tamiflu  Flu swab was performed today  Recommend she continue with over-the-counter ibuprofen and can take cold and flu  Recommend avoiding work next 2 days least until the return flu swab and she is feeling better  Subjective:     Elena Haskins is a 52 y o  female who  has a past medical history of Back pain, Chlamydia, Dental disease, Ear problems, Fatigue, Hyperlipidemia, Iron deficiency anemia, Nasal congestion, Palpitation, Sleep apnea, Tonsillitis, Varicella, Vitamin B12 deficiency, and Vitamin D deficiency  She also has no past medical history of Abnormal Pap smear of cervix, CPAP (continuous positive airway pressure) dependence, Disease of thyroid gland, Endometriosis, Fibroid, Herpes, HPV (human papilloma virus) infection, Syphilis, Urinary tract infection, or Urogenital trichomoniasis   who presented to the office today for acute visit for fever, congestion, headache and body aches  Granddaughter was sick recently  She has since sick for last 2 days  She does work in health care  She denies any contacts with people with flu or the corona virus  She is any GI symptoms  She did take ibuprofen today to with fever and pains  She is not having any wheezing or shortness of breath  The following portions of the patient's history were reviewed and updated as appropriate:   She  has a past medical history of Back pain, Chlamydia, Dental disease, Ear problems, Fatigue, Hyperlipidemia, Iron deficiency anemia, Nasal congestion, Palpitation, Sleep apnea, Tonsillitis, Varicella, Vitamin B12 deficiency, and Vitamin D deficiency  She   Patient Active Problem List    Diagnosis Date Noted    Class 1 obesity due to excess calories without serious comorbidity with body mass index (BMI) of 30 0 to 30 9 in adult 2020    Conductive hearing loss of left ear with unrestricted hearing of right ear 06/10/2019    Perforation of left tympanic membrane 06/10/2019    Left arm numbness 2019    Iron deficiency anemia 2018    B12 deficiency 2018    Fatigue 2018    Upper back pain 2018    Need for hepatitis B vaccination 2018    Breast lump 10/23/2017    Chronic cough 10/19/2017    Hearing loss of left ear 10/19/2017    Heart palpitations 10/19/2017    Increased frequency of urination 10/19/2017    Low back pain 2016    Low vitamin D level 10/17/2016    Left knee pain 10/14/2016    Neck pain 2015    Essential hypertriglyceridemia 10/07/2015    Anxiety 2014     She  has a past surgical history that includes  section; Tubal ligation; and TONSILLECTOMY (Bilateral, 2019)  Her family history includes Cancer in her mother; Diabetes in her mother; Hyperlipidemia in her family; Hypertension in her mother; Lung cancer in her mother; Stroke in her mother    She  reports that she quit smoking about 4 years ago  She has never used smokeless tobacco  She reports that she drank alcohol  She reports that she has current or past drug history  Drug: Marijuana  Current Outpatient Medications   Medication Sig Dispense Refill    ergocalciferol (VITAMIN D2) 50,000 units   0    medroxyPROGESTERone (PROVERA) 10 mg tablet TAKE 1 TABLET DAILY FOR 15 DAYS THEN 15 DAYS OFF THEN REPEAT FOR NEXT MONTH (Patient not taking: Reported on 3/12/2020) 15 tablet 0    oseltamivir (TAMIFLU) 75 mg capsule Take 1 capsule (75 mg total) by mouth 2 (two) times a day for 5 days 10 capsule 0    RA VITAMIN B-12 TR 1000 MCG TBCR   0     No current facility-administered medications for this visit  Current Outpatient Medications on File Prior to Visit   Medication Sig    ergocalciferol (VITAMIN D2) 50,000 units     medroxyPROGESTERone (PROVERA) 10 mg tablet TAKE 1 TABLET DAILY FOR 15 DAYS THEN 15 DAYS OFF THEN REPEAT FOR NEXT MONTH (Patient not taking: Reported on 3/12/2020)    RA VITAMIN B-12 TR 1000 MCG TBCR      No current facility-administered medications on file prior to visit  She has No Known Allergies       Current Outpatient Medications on File Prior to Visit   Medication Sig Dispense Refill    ergocalciferol (VITAMIN D2) 50,000 units   0    medroxyPROGESTERone (PROVERA) 10 mg tablet TAKE 1 TABLET DAILY FOR 15 DAYS THEN 15 DAYS OFF THEN REPEAT FOR NEXT MONTH (Patient not taking: Reported on 3/12/2020) 15 tablet 0    RA VITAMIN B-12 TR 1000 MCG TBCR   0     No current facility-administered medications on file prior to visit  Review of Systems   Constitutional: Positive for activity change, appetite change, chills, fatigue and fever  HENT: Positive for congestion, postnasal drip, rhinorrhea, sneezing and sore throat  Negative for ear discharge and ear pain  Eyes: Negative for redness  Respiratory: Positive for cough  Negative for choking and wheezing      Cardiovascular: Negative for chest pain  Gastrointestinal: Negative for abdominal pain, diarrhea, nausea and vomiting  Genitourinary: Negative for difficulty urinating  Musculoskeletal: Positive for myalgias  Skin: Negative for rash  Neurological: Positive for headaches  Objective:    /90 (BP Location: Left arm, Patient Position: Sitting, Cuff Size: Large)   Pulse 65   Temp 98 °F (36 7 °C) (Temporal)   Resp 16   Ht 5' 1 5" (1 562 m)   Wt 74 8 kg (165 lb)   SpO2 98%   BMI 30 67 kg/m²     Physical Exam   Constitutional: She is oriented to person, place, and time  She appears well-developed and well-nourished  No distress  HENT:   Head: Normocephalic and atraumatic  Right Ear: External ear normal    Left Ear: External ear normal    Nose: Nose normal    Mouth/Throat: Oropharynx is clear and moist    Eyes: Conjunctivae are normal    Neck: Normal range of motion  Neck supple  No thyromegaly present  Cardiovascular: Normal rate, regular rhythm and normal heart sounds  No murmur heard  Pulmonary/Chest: Effort normal and breath sounds normal  No respiratory distress  She has no wheezes  Lymphadenopathy:     She has no cervical adenopathy  Neurological: She is alert and oriented to person, place, and time  Psychiatric: She has a normal mood and affect  Her behavior is normal    Nursing note and vitals reviewed        Riley Leslie PA-C  03/13/20  10:01 AM

## 2020-03-13 PROBLEM — E66.09 CLASS 1 OBESITY DUE TO EXCESS CALORIES WITHOUT SERIOUS COMORBIDITY WITH BODY MASS INDEX (BMI) OF 30.0 TO 30.9 IN ADULT: Status: ACTIVE | Noted: 2020-03-13

## 2020-03-13 PROBLEM — E66.811 CLASS 1 OBESITY DUE TO EXCESS CALORIES WITHOUT SERIOUS COMORBIDITY WITH BODY MASS INDEX (BMI) OF 30.0 TO 30.9 IN ADULT: Status: ACTIVE | Noted: 2020-03-13

## 2020-03-13 LAB
FLUAV RNA NPH QL NAA+PROBE: NORMAL
FLUBV RNA NPH QL NAA+PROBE: NORMAL
RSV RNA NPH QL NAA+PROBE: NORMAL

## 2020-03-13 NOTE — ASSESSMENT & PLAN NOTE
BMI Counseling: Body mass index is 30 67 kg/m²  The BMI is above normal  Nutrition recommendations include 3-5 servings of fruits/vegetables daily

## 2020-03-27 ENCOUNTER — TELEMEDICINE (OUTPATIENT)
Dept: FAMILY MEDICINE CLINIC | Facility: CLINIC | Age: 50
End: 2020-03-27

## 2020-03-27 DIAGNOSIS — Z20.828 EXPOSURE TO SARS-ASSOCIATED CORONAVIRUS: Primary | ICD-10-CM

## 2020-03-27 PROCEDURE — 99441 PR PHYS/QHP TELEPHONE EVALUATION 5-10 MIN: CPT | Performed by: PHYSICIAN ASSISTANT

## 2020-03-27 NOTE — PROGRESS NOTES
COVID-19 Virtual Visit     This virtual check-in was done via telephone  Encounter provider Vernon Barry PA-C    [facility] [address] [address] [phone]    Recent Visits  No visits were found meeting these conditions  Showing recent visits within past 7 days and meeting all other requirements     Today's Visits  Date Type Provider Dept   03/27/20 Telemedicine ER[facility] Schmitt Kathy   Showing today's visits and meeting all other requirements     Future Appointments  Date Type Provider Dept   03/27/20 Telemedicine ER[facility] Schmitt Kathy   Showing future appointments within next 150 days and meeting all other requirements        Patient agrees to participate in a virtual check in via telephone or video visit instead of presenting to the office to address urgent/immediate medical needs  Patient is aware this is a billable service  After connecting through telephone, the patient was identified by name and date of birth  Aj Lemus was informed that this was a telemedicine visit and that the exam was being conducted confidentially over secure lines  My office door was closed  No one else was in the room  Aj Lemus acknowledged consent and understanding of privacy and security of the telemedicine visit  I informed the patient that I have reviewed her record in Epic and presented the opportunity for her to ask any questions regarding the visit today  The patient agreed to participate  Aj Lemus is a 52 y o  female who is concerned about COVID-19  She reports cough  She was seen here for this on 3/12 and had congestion and fever x 1 day  She had negative flu testing  Fever had resolved after 24 hours  No SOB  She has had mild cough since then  She has not traveled outside the [place]  within the last 14 days    She has had contact with a person who is under investigation for or who is positive for COVID-19 within the last 14 days  She has not been hospitalized recently for fever and/or lower respiratory symptoms  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  She is not having any SOB, wheeze, fever, GI symtpoms  She has had a mild rare cough  Past Medical History:   Diagnosis Date    Back pain     Chlamydia         Dental disease     Ear problems     Fatigue     Hyperlipidemia     no meds at present    Iron deficiency anemia     iron and B12 def, has had iron infusions    Nasal congestion     Palpitation     Sleep apnea     Tonsillitis     Varicella     Vitamin B12 deficiency     Vitamin D deficiency        Past Surgical History:   Procedure Laterality Date     SECTION      TONSILLECTOMY Bilateral 2019    Procedure: TONSILLECTOMY;  Surgeon: Casimiro Solis MD;  Location: [facility] MAIN OR;  Service: ENT    TUBAL LIGATION         Current Outpatient Medications   Medication Sig Dispense Refill    ergocalciferol (VITAMIN D2) 50,000 units   0    medroxyPROGESTERone (PROVERA) 10 mg tablet TAKE 1 TABLET DAILY FOR 15 DAYS THEN 15 DAYS OFF THEN REPEAT FOR NEXT MONTH (Patient not taking: Reported on 3/12/2020) 15 tablet 0    RA VITAMIN B-12 TR 1000 MCG TBCR   0     No current facility-administered medications for this visit  No Known Allergies    Video Exam    Sanjuana Reynolds appears unable to be seen due to lack of video  Sounds in no acute distress   Disposition:      I recommended self-quarantine for 14 days and to call back for worsening symptoms or development of shortness of breath  Discussed precautions of when to go to ED  I spent 7 minutes with the patient during this virtual check-in visit

## 2020-03-27 NOTE — LETTER
March 27, 2020     Patient: Jules Ponce   YOB: 1970   Date of Visit: 3/27/2020       To Whom it May Concern:    Jules Ponce is under my professional care  She was seen in my office on 3/27/2020  She may return to work on 4/10/2020  If you have any questions or concerns, please don't hesitate to call           Sincerely,          Riley Leslie PA-C        CC: No Recipients

## 2020-04-08 ENCOUNTER — TELEMEDICINE (OUTPATIENT)
Dept: FAMILY MEDICINE CLINIC | Facility: CLINIC | Age: 50
End: 2020-04-08

## 2020-04-08 DIAGNOSIS — R05.3 CHRONIC COUGH: Primary | ICD-10-CM

## 2020-04-08 DIAGNOSIS — N92.0 MENORRHAGIA WITH REGULAR CYCLE: ICD-10-CM

## 2020-04-08 PROCEDURE — 99441 PR PHYS/QHP TELEPHONE EVALUATION 5-10 MIN: CPT | Performed by: PHYSICIAN ASSISTANT

## 2020-09-25 ENCOUNTER — OFFICE VISIT (OUTPATIENT)
Dept: FAMILY MEDICINE CLINIC | Facility: CLINIC | Age: 50
End: 2020-09-25
Payer: COMMERCIAL

## 2020-09-25 VITALS
HEIGHT: 62 IN | BODY MASS INDEX: 32.02 KG/M2 | TEMPERATURE: 97.6 F | WEIGHT: 174 LBS | DIASTOLIC BLOOD PRESSURE: 76 MMHG | SYSTOLIC BLOOD PRESSURE: 130 MMHG

## 2020-09-25 DIAGNOSIS — E78.1 ESSENTIAL HYPERTRIGLYCERIDEMIA: Primary | ICD-10-CM

## 2020-09-25 DIAGNOSIS — R79.89 LOW VITAMIN D LEVEL: ICD-10-CM

## 2020-09-25 DIAGNOSIS — G44.229 CHRONIC TENSION-TYPE HEADACHE, NOT INTRACTABLE: ICD-10-CM

## 2020-09-25 DIAGNOSIS — N92.0 MENORRHAGIA WITH REGULAR CYCLE: ICD-10-CM

## 2020-09-25 DIAGNOSIS — Z12.11 SCREEN FOR COLON CANCER: ICD-10-CM

## 2020-09-25 DIAGNOSIS — M54.2 NECK PAIN: ICD-10-CM

## 2020-09-25 DIAGNOSIS — D50.0 IRON DEFICIENCY ANEMIA DUE TO CHRONIC BLOOD LOSS: ICD-10-CM

## 2020-09-25 DIAGNOSIS — E66.09 CLASS 1 OBESITY DUE TO EXCESS CALORIES WITHOUT SERIOUS COMORBIDITY WITH BODY MASS INDEX (BMI) OF 34.0 TO 34.9 IN ADULT: ICD-10-CM

## 2020-09-25 PROBLEM — H90.12 CONDUCTIVE HEARING LOSS OF LEFT EAR WITH UNRESTRICTED HEARING OF RIGHT EAR: Status: RESOLVED | Noted: 2019-06-10 | Resolved: 2020-09-25

## 2020-09-25 PROBLEM — E53.8 B12 DEFICIENCY: Status: RESOLVED | Noted: 2018-02-20 | Resolved: 2020-09-25

## 2020-09-25 PROBLEM — Z23 NEED FOR HEPATITIS B VACCINATION: Status: RESOLVED | Noted: 2018-02-01 | Resolved: 2020-09-25

## 2020-09-25 PROBLEM — N63.0 BREAST LUMP: Status: RESOLVED | Noted: 2017-10-23 | Resolved: 2020-09-25

## 2020-09-25 PROBLEM — M54.9 UPPER BACK PAIN: Status: RESOLVED | Noted: 2018-02-01 | Resolved: 2020-09-25

## 2020-09-25 PROBLEM — R53.83 FATIGUE: Status: RESOLVED | Noted: 2018-02-01 | Resolved: 2020-09-25

## 2020-09-25 PROBLEM — R20.0 LEFT ARM NUMBNESS: Status: RESOLVED | Noted: 2019-02-11 | Resolved: 2020-09-25

## 2020-09-25 PROBLEM — R05.3 CHRONIC COUGH: Status: RESOLVED | Noted: 2017-10-19 | Resolved: 2020-09-25

## 2020-09-25 PROBLEM — H72.92 PERFORATION OF LEFT TYMPANIC MEMBRANE: Status: RESOLVED | Noted: 2019-06-10 | Resolved: 2020-09-25

## 2020-09-25 PROBLEM — R00.2 HEART PALPITATIONS: Status: RESOLVED | Noted: 2017-10-19 | Resolved: 2020-09-25

## 2020-09-25 PROBLEM — R35.0 INCREASED FREQUENCY OF URINATION: Status: RESOLVED | Noted: 2017-10-19 | Resolved: 2020-09-25

## 2020-09-25 PROCEDURE — 1036F TOBACCO NON-USER: CPT | Performed by: FAMILY MEDICINE

## 2020-09-25 PROCEDURE — 99204 OFFICE O/P NEW MOD 45 MIN: CPT | Performed by: FAMILY MEDICINE

## 2020-09-25 NOTE — PATIENT INSTRUCTIONS
Neck Exercises   AMBULATORY CARE:   Neck exercises  help reduce neck pain, and improve neck movement and strength  Neck exercises also help prevent long-term neck problems  What you need to know about neck exercises:   Do the exercises every day,  or as often as directed by your healthcare provider  Move slowly, gently, and smoothly  Avoid fast or jerky motions  Stand and sit the way your healthcare provider shows you  Good posture may reduce your neck pain  Check your posture often, even when you are not doing your neck exercises  How to perform neck exercises safely:   Exercise position:  You may sit or stand while you do neck exercises  Face forward  Your shoulders should be straight and relaxed, with a good posture  Head tilts, forward and back:  Gently bow your head and try to touch your chin to your chest  Your healthcare provider may tell you to push on the back of your neck to help bow your head  Raise your chin back to the starting position  Tilt your head back as far as possible so you are looking up at the ceiling  Your healthcare provider may tell you to lift your chin to help tilt your head back  Return your head to the starting position  Head tilts, side to side:  Tilt your head, bringing your ear toward your shoulder  Then tilt your head toward the other shoulder  Head turns:  Turn your head to look over your shoulder  Tilt your chin down and try to touch it to your shoulder  Do not raise your shoulder to your chin  Face forward again  Do the same on the other side  Head rolls:  Slowly bring your chin toward your chest  Next, roll your head to the right  Your ear should be positioned over your shoulder  Hold this position for 5 seconds  Roll your head back toward your chest and to the left into the same position  Hold for 5 seconds  Gently roll your head back and around in a clockwise [place] 3 times   Next, move your head in the reverse direction (counterclockwise) in a [place] 3 times  Do not shrug your shoulders upwards while you do this exercise  Contact your healthcare provider if:   Your pain does not get better, or gets worse  You have questions or concerns about your condition, care, or exercise program   © 2017 [employer] Information is for End User's use only and may not be sold, redistributed or otherwise used for commercial purposes  All illustrations and images included in CareNotes® are the copyrighted property of [employer]  or [employer]  The above information is an  only  It is not intended as medical advice for individual conditions or treatments  Talk to your doctor, nurse or pharmacist before following any medical regimen to see if it is safe and effective for you  Tension Headache   AMBULATORY CARE:   A tension headache  is often caused by tense muscles in your head or neck and can last anywhere from 30 minutes to several days  Although they are uncomfortable, tension headaches usually do not cause any serious problems   The following can cause muscle tension and trigger a tension headache:  · Eye strain or poor posture, such as from using a computer    · Jaw or dental problems such as temporomandibular joint (TMJ), clenching your jaw, or grinding your teeth    · Activities that cause your head to be held in one position for too long    · Skipping a meal    · Not enough sleep, or sleep apnea (brief periods of not breathing during sleep)    · Food sensitivities, such as to gluten  Common symptoms include the following:   · Dull, constant pain above your eyes and across the back of your head    · Head pain that gets worse as the day goes on    · Pain that may spread over your entire head and to your neck and shoulders    · Tight neck or shoulder muscles    · Head pain that is made worse by bright lights or loud noises  Seek care immediately if:   · You have a sudden headache that seems different or much worse than your usual headaches  · You have difficulty seeing, speaking, or moving  · You pass out, become confused, or have a seizure  · You have a headache, fever, and a stiff neck  Contact your healthcare provider if:   · Your headaches continue to get worse  · Your headaches happen so often that they affect your ability to do your work or normal activities  · You need to take medicine to help your headaches more often than your healthcare provider says you should  · Your headaches get so bad that they cause you to vomit  · You have questions or concerns about your condition or care  Treatment  may include any of the following:  · NSAIDs , such as ibuprofen, help decrease swelling, pain, and fever  This medicine is available with or without a doctor's order  NSAIDs can cause stomach bleeding or kidney problems in certain people  If you take blood thinner medicine, always ask your healthcare provider if NSAIDs are safe for you  Always read the medicine label and follow directions  · Acetaminophen  decreases pain and fever  It is available without a doctor's order  Ask how much to take and how often to take it  Follow directions  Read the labels of all other medicines you are using to see if they also contain acetaminophen, or ask your doctor or pharmacist  Acetaminophen can cause liver damage if not taken correctly  Do not use more than 4 grams (4,000 milligrams) total of acetaminophen in one day  · Treatment  may be given for back, muscle, or posture problems  You may also need treatment for jaw or tooth problems  Manage your symptoms:   · Keep a headache record  Include when the headaches start and stop and what made them better  Describe your symptoms, such as how the pain feels, where it is, and how bad it is  Record anything you ate or drank for the past 24 hours before your headache  Bring this to follow-up visits  · Apply heat as directed    Heat may help decrease headache pain and muscle spasms  Apply heat on the area for 20 to 30 minutes every 2 hours for as many days as directed  A warm bath may also help relieve muscle tension and spasms  · Apply ice as directed  Ice may help decrease headache pain  Use an ice pack or put crushed ice in a plastic bag  Cover it with a towel and place it on the area for 15 to 20 minutes every hour as directed  Prevent a tension headache:   · Avoid muscle tension  Do not stay in one position for long periods of time  Use a different pillow if you wake up with sore neck and shoulder muscles  Find ways to relax your muscles, such as massage or resting in a quiet, dark room  · Avoid eye strain  Make sure you have good lighting when you read, sew, or do similar activities  Get yearly eye exams and wear glasses as directed  · Get enough sleep  Get 8 to 10 hours of sleep each night  Create a sleep schedule  Go to bed and wake up at the same times each day  It may be helpful to do something relaxing before bed  Do not watch television right before bed  · Eat a variety of healthy foods  Healthy foods include fruits, vegetables, whole-grain breads, low-fat dairy products, beans, lean meats, and fish  Do not eat foods that trigger your headaches  · Exercise regularly  Exercise helps decrease stress and headaches  Ask about the best exercise plan for you  · Drink liquids as directed  You may need to drink more liquid to prevent dehydration  Dehydration can make a tension headache worse  Ask your healthcare provider how much liquid to drink and which liquids are best for you  Limit caffeine as directed  Caffeine may make a tension headache worse  · Do not drink alcohol  Alcohol can trigger a headache  It can also prevent medicines from stopping your headache  · Do not smoke  Nicotine and other chemicals in cigarettes and cigars can trigger a headache and also cause lung damage   Ask your healthcare provider for information if you currently smoke and need help to quit  E-cigarettes or smokeless tobacco still contain nicotine  Talk to your healthcare provider before you use these products  Follow up with your healthcare provider as directed:  Bring the headache record with you  Write down your questions so you remember to ask them during your visits  © 2017 [employer] Information is for End User's use only and may not be sold, redistributed or otherwise used for commercial purposes  All illustrations and images included in CareNotes® are the copyrighted property of [employer]  or [employer]  The above information is an  only  It is not intended as medical advice for individual conditions or treatments  Talk to your doctor, nurse or pharmacist before following any medical regimen to see if it is safe and effective for you

## 2020-09-25 NOTE — ASSESSMENT & PLAN NOTE
Headache diet givne Increase in hydration recommending Tylneol as needed check labs and neck exercises follwoup in 4 weeks

## 2020-09-25 NOTE — PROGRESS NOTES
Assessment/Plan:    Essential hypertriglyceridemia  Reviewed diet and repeat labs    Low vitamin D level  Continue vitamin and check labs    Neck pain  tylneol and neck exercises    Iron deficiency anemia  Check labs refer to GYN    Class 1 obesity due to excess calories without serious comorbidity with body mass index (BMI) of 34 0 to 34 9 in adult  Diet nad exercise discussed Patient to follwoup in 4 weeks    Menorrhagia with regular cycle  Refer to GYN    Chronic tension-type headache, not intractable  Headache diet givne Increase in hydration recommending Tylneol as needed check labs and neck exercises follwoup in 4 weeks        Diagnoses and all orders for this visit:    Essential hypertriglyceridemia  -     Comprehensive metabolic panel; Future  -     Lipid panel; Future    Chronic tension-type headache, not intractable    Menorrhagia with regular cycle  -     CBC and differential; Future  -     Iron; Future  -     Ferritin; Future  -     TIBC; Future  -     TSH, 3rd generation with Free T4 reflex; Future  -     Ambulatory referral to Obstetrics / Gynecology; Future    Iron deficiency anemia due to chronic blood loss  -     CBC and differential; Future  -     Iron; Future  -     Ferritin; Future  -     TIBC; Future  -     Ambulatory referral to Obstetrics / Gynecology; Future    Neck pain    Low vitamin D level  -     Vitamin D 25 hydroxy; Future    Class 1 obesity due to excess calories without serious comorbidity with body mass index (BMI) of 34 0 to 34 9 in adult  -     Comprehensive metabolic panel; Future    Screen for colon cancer  -     Cologuard; Future          Subjective:   Chief Complaint   Patient presents with    Anemia     iron deficient    Headache     "almost every day during past 2 months-new glasses 3 weeks ago"        Patient ID: Alfredo Rowley is a 48 y o  female      Patient is here as a new patient today Patient has a history of heavy menses and iron deficiecny anemia Patient last CBC and iron studies were last year and normal Patient had insurance then She lost insurance so never saw the GYN for followup Paitnet also ahs tension headache and neck pain for last 1-2 months Patient takes tyelnol and it goes away Patient symptoms last about 2 hours Patient has family hsitory of DM and hypertension and also CVA patieint has in the past had lipids done showing elevated triglycerides Patient is not exericisng Patient needs colon cancer screening      The following portions of the patient's history were reviewed and updated as appropriate: allergies, current medications, past family history, past medical history, past social history, past surgical history and problem list     Review of Systems   Constitutional: Negative for fatigue, fever and unexpected weight change  HENT: Negative for congestion, sinus pain and trouble swallowing  Eyes: Negative for discharge and visual disturbance  Respiratory: Negative for cough, chest tightness, shortness of breath and wheezing  Cardiovascular: Negative for chest pain, palpitations and leg swelling  Gastrointestinal: Negative for abdominal pain, blood in stool, constipation, diarrhea, nausea and vomiting  Genitourinary: Positive for menstrual problem and vaginal bleeding  Negative for difficulty urinating, dysuria, frequency and hematuria  Musculoskeletal: Positive for back pain, neck pain and neck stiffness  Negative for arthralgias, gait problem and joint swelling  History of back pain   Skin: Negative for rash and wound  Allergic/Immunologic: Negative for environmental allergies and food allergies  Neurological: Positive for headaches  Negative for dizziness, syncope, weakness and numbness  Tension headache and occipital headache   Hematological: Negative for adenopathy  Does not bruise/bleed easily  Psychiatric/Behavioral: Negative for confusion, decreased concentration, dysphoric mood and sleep disturbance   The patient is not nervous/anxious  Objective:      /76   Temp 97 6 °F (36 4 °C)   Ht 5' 1 5" (1 562 m)   Wt 78 9 kg (174 lb)   BMI 32 34 kg/m²          Physical Exam  Vitals signs and nursing note reviewed  Constitutional:       Appearance: She is well-developed  HENT:      Head: Normocephalic and atraumatic  Right Ear: Hearing, tympanic membrane and external ear normal       Left Ear: Hearing, tympanic membrane and external ear normal       Nose: Nose normal       Mouth/Throat:      Mouth: Mucous membranes are moist    Eyes:      Extraocular Movements: Extraocular movements intact  Conjunctiva/sclera: Conjunctivae normal       Pupils: Pupils are equal, round, and reactive to light  Neck:      Musculoskeletal: Neck supple  Thyroid: No thyromegaly  Cardiovascular:      Rate and Rhythm: Normal rate  Heart sounds: Normal heart sounds  Pulmonary:      Effort: Pulmonary effort is normal       Breath sounds: Normal breath sounds  No wheezing or rales  Abdominal:      General: Bowel sounds are normal  There is no distension  Palpations: Abdomen is soft  Tenderness: There is no abdominal tenderness  Musculoskeletal:         General: Tenderness present  Comments: Pain to palpation occipital area and along b/l trapeziues Decrease ROM in all planes of neck   Lymphadenopathy:      Cervical: No cervical adenopathy  Skin:     General: Skin is warm and dry  Findings: No rash  Neurological:      General: No focal deficit present  Mental Status: She is alert and oriented to person, place, and time  Cranial Nerves: No cranial nerve deficit  Sensory: No sensory deficit  Motor: No weakness  Coordination: Coordination normal       Gait: Gait normal       Deep Tendon Reflexes: Reflexes normal    Psychiatric:         Mood and Affect: Mood normal          Behavior: Behavior normal          Thought Content:  Thought content normal          Judgment: Judgment normal

## 2020-10-01 LAB
25(OH)D3 SERPL-MCNC: 23 NG/ML (ref 30–100)
ALBUMIN SERPL-MCNC: 4.1 G/DL (ref 3.6–5.1)
ALBUMIN/GLOB SERPL: 1.5 (CALC) (ref 1–2.5)
ALP SERPL-CCNC: 49 U/L (ref 37–153)
ALT SERPL-CCNC: 11 U/L (ref 6–29)
AST SERPL-CCNC: 14 U/L (ref 10–35)
BASOPHILS # BLD AUTO: 83 CELLS/UL (ref 0–200)
BASOPHILS NFR BLD AUTO: 1.4 %
BILIRUB SERPL-MCNC: 0.3 MG/DL (ref 0.2–1.2)
BUN SERPL-MCNC: 22 MG/DL (ref 7–25)
BUN/CREAT SERPL: ABNORMAL (CALC) (ref 6–22)
CALCIUM SERPL-MCNC: 8.9 MG/DL (ref 8.6–10.4)
CHLORIDE SERPL-SCNC: 105 MMOL/L (ref 98–110)
CHOLEST SERPL-MCNC: 212 MG/DL
CHOLEST/HDLC SERPL: 3.7 (CALC)
CO2 SERPL-SCNC: 26 MMOL/L (ref 20–32)
CREAT SERPL-MCNC: 0.82 MG/DL (ref 0.5–1.05)
EOSINOPHIL # BLD AUTO: 230 CELLS/UL (ref 15–500)
EOSINOPHIL NFR BLD AUTO: 3.9 %
ERYTHROCYTE [DISTWIDTH] IN BLOOD BY AUTOMATED COUNT: 15.1 % (ref 11–15)
FERRITIN SERPL-MCNC: 2 NG/ML (ref 16–232)
GLOBULIN SER CALC-MCNC: 2.7 G/DL (CALC) (ref 1.9–3.7)
GLUCOSE SERPL-MCNC: 101 MG/DL (ref 65–99)
HCT VFR BLD AUTO: 27.9 % (ref 35–45)
HDLC SERPL-MCNC: 57 MG/DL
HGB BLD-MCNC: 8.5 G/DL (ref 11.7–15.5)
IRON SATN MFR SERPL: 3 % (CALC) (ref 16–45)
IRON SERPL-MCNC: 16 MCG/DL (ref 45–160)
LDLC SERPL CALC-MCNC: 119 MG/DL (CALC)
LYMPHOCYTES # BLD AUTO: 1888 CELLS/UL (ref 850–3900)
LYMPHOCYTES NFR BLD AUTO: 32 %
MCH RBC QN AUTO: 23.5 PG (ref 27–33)
MCHC RBC AUTO-ENTMCNC: 30.5 G/DL (ref 32–36)
MCV RBC AUTO: 77.1 FL (ref 80–100)
MONOCYTES # BLD AUTO: 631 CELLS/UL (ref 200–950)
MONOCYTES NFR BLD AUTO: 10.7 %
NEUTROPHILS # BLD AUTO: 3068 CELLS/UL (ref 1500–7800)
NEUTROPHILS NFR BLD AUTO: 52 %
NONHDLC SERPL-MCNC: 155 MG/DL (CALC)
PLATELET # BLD AUTO: 467 THOUSAND/UL (ref 140–400)
PMV BLD REES-ECKER: 10.6 FL (ref 7.5–12.5)
POTASSIUM SERPL-SCNC: 4.6 MMOL/L (ref 3.5–5.3)
PROT SERPL-MCNC: 6.8 G/DL (ref 6.1–8.1)
RBC # BLD AUTO: 3.62 MILLION/UL (ref 3.8–5.1)
SL AMB EGFR AFRICAN AMERICAN: 97 ML/MIN/1.73M2
SL AMB EGFR NON AFRICAN AMERICAN: 83 ML/MIN/1.73M2
SODIUM SERPL-SCNC: 137 MMOL/L (ref 135–146)
TIBC SERPL-MCNC: 494 MCG/DL (CALC) (ref 250–450)
TRIGL SERPL-MCNC: 237 MG/DL
TSH SERPL-ACNC: 1.94 MIU/L
WBC # BLD AUTO: 5.9 THOUSAND/UL (ref 3.8–10.8)

## 2020-11-23 ENCOUNTER — TELEMEDICINE (OUTPATIENT)
Dept: FAMILY MEDICINE CLINIC | Facility: CLINIC | Age: 50
End: 2020-11-23
Payer: COMMERCIAL

## 2020-11-23 DIAGNOSIS — B34.9 VIRAL INFECTION, UNSPECIFIED: ICD-10-CM

## 2020-11-23 DIAGNOSIS — Z20.822 EXPOSURE TO COVID-19 VIRUS: ICD-10-CM

## 2020-11-23 PROCEDURE — 99213 OFFICE O/P EST LOW 20 MIN: CPT | Performed by: FAMILY MEDICINE

## 2020-11-24 DIAGNOSIS — B34.9 VIRAL INFECTION, UNSPECIFIED: ICD-10-CM

## 2020-11-24 DIAGNOSIS — Z20.822 EXPOSURE TO COVID-19 VIRUS: ICD-10-CM

## 2020-11-24 PROCEDURE — U0003 INFECTIOUS AGENT DETECTION BY NUCLEIC ACID (DNA OR RNA); SEVERE ACUTE RESPIRATORY SYNDROME CORONAVIRUS 2 (SARS-COV-2) (CORONAVIRUS DISEASE [COVID-19]), AMPLIFIED PROBE TECHNIQUE, MAKING USE OF HIGH THROUGHPUT TECHNOLOGIES AS DESCRIBED BY CMS-2020-01-R: HCPCS | Performed by: FAMILY MEDICINE

## 2020-11-25 LAB — SARS-COV-2 RNA SPEC QL NAA+PROBE: NOT DETECTED

## 2021-01-18 ENCOUNTER — TELEPHONE (OUTPATIENT)
Dept: OBGYN CLINIC | Facility: CLINIC | Age: 51
End: 2021-01-18

## 2021-01-19 ENCOUNTER — OFFICE VISIT (OUTPATIENT)
Dept: OBGYN CLINIC | Facility: CLINIC | Age: 51
End: 2021-01-19
Payer: COMMERCIAL

## 2021-01-19 VITALS
BODY MASS INDEX: 29.44 KG/M2 | DIASTOLIC BLOOD PRESSURE: 80 MMHG | WEIGHT: 160 LBS | SYSTOLIC BLOOD PRESSURE: 120 MMHG | HEIGHT: 62 IN

## 2021-01-19 DIAGNOSIS — D25.1 LEIOMYOMA, INTRAMURAL: Primary | ICD-10-CM

## 2021-01-19 DIAGNOSIS — N92.1 MENORRHAGIA WITH IRREGULAR CYCLE: ICD-10-CM

## 2021-01-19 PROCEDURE — 99214 OFFICE O/P EST MOD 30 MIN: CPT | Performed by: OBSTETRICS & GYNECOLOGY

## 2021-01-19 PROCEDURE — 1036F TOBACCO NON-USER: CPT | Performed by: OBSTETRICS & GYNECOLOGY

## 2021-01-19 PROCEDURE — 58100 BIOPSY OF UTERUS LINING: CPT | Performed by: OBSTETRICS & GYNECOLOGY

## 2021-01-19 PROCEDURE — 88305 TISSUE EXAM BY PATHOLOGIST: CPT | Performed by: PATHOLOGY

## 2021-01-19 PROCEDURE — 3008F BODY MASS INDEX DOCD: CPT | Performed by: OBSTETRICS & GYNECOLOGY

## 2021-01-19 RX ORDER — MEDROXYPROGESTERONE ACETATE 5 MG/1
5 TABLET ORAL DAILY
Qty: 30 TABLET | Refills: 0 | Status: SHIPPED | OUTPATIENT
Start: 2021-01-19 | End: 2021-02-25

## 2021-01-19 NOTE — PROGRESS NOTES
CC:  Heavy vaginal bleeding    HPI: Negar Sheffield presents for ongoing heavy vaginal bleeding  The patient states she has been bleeding for 3 weeks straight  Historically the patient has been evaluated for this a year and a half ago  At that time the patient was given 2 months of progesterone and was to call to see how she was doing but she never followed up  A urine a half later she now presents and continues to have heavy bleeding and occasional lightheadedness  Past Medical History:  Past Medical History:   Diagnosis Date    Back pain     Chlamydia         Dental disease     Ear problems     Fatigue     Hyperlipidemia     no meds at present    Iron deficiency anemia     iron and B12 def, has had iron infusions    Nasal congestion     Palpitation     Sleep apnea     Tonsillitis     Varicella     Vitamin B12 deficiency     Vitamin D deficiency        Past Surgical History:  Past Surgical History:   Procedure Laterality Date     SECTION      TONSILLECTOMY Bilateral 2019    Procedure: TONSILLECTOMY;  Surgeon: Shayla Salas MD;  Location: [address] [place] [address];  Service: ENT    TUBAL LIGATION         Past OB/Gyn History:  Menstrual cycles are very heavy, very irregular and prolonged period    ALLERGIES: No Known Allergies    MEDS:   Current Outpatient Medications:     ergocalciferol (VITAMIN D2) 50,000 units    RA VITAMIN B-12 TR 1000 MCG CR    Review of Systems:  Skin: No rashes or discolorations of any concern  RESP: Denies SOB, no cough  CV: Denies chest pain or palpitations  Breasts: Denies masses, pain, skin changes and nipple discharge  GI: Denies abdominal pain, heartburn, nausea, vomiting, changes in bowel habits  : Denies dysuria, frequency, CVA tenderness, incontinence and hematuria  Genitalia: Denies abnormal vaginal discharge, external lesions, rashes, pelvic pain, pressure, but positive for abnormal bleeding    Rectal:  Denies pain, bleeding, hemorrhoids,    Physical Exam:  /80 (BP Location: Right arm, Patient Position: Sitting, Cuff Size: Standard)   Ht 5' 1 5" (1 562 m)   Wt 72 6 kg (160 lb)   LMP 12/21/2020   BMI 29 74 kg/m²    Gen: The patient was alert and oriented x3, pleasant well-appearing female in no acute distress  Abd:  Soft, nontender, nondistended, no masses or organomegaly  Back:  No CVA tenderness, no tenderness to palpation along spine  Pelvic  Normal appearing external female genitalia, no visible lesions, no rashes  Vagina is free of discharge, normal vaginal epithelium, no abnormal  lesions, no evidence of prolapse anteriorly or posteriorly  Normal appearing cervix, mobile and nontender  Uterus is 16 week size with several fibroids, mobile and, nontender  No palpable adnexal masses or tenderness  No anoperineal lesions  Skin:  No concerning lesions  Extremeties: No edema    Endometrial biopsy: With the patient's written consent, under aseptic technique, an aspiration endometrial sampling is performed with a large amount of blood and clot and some tissue removed  The uterus sounds to 10 cm  Assessment & Plan:   1  Menorrhagia and uterine fibroids  Gio Lynne will be brought back in for an ultrasound to again get a better identification of the fibroids and ovaries  She was informed at this point a hysterectomy would be her overall best option  Given the fact that previously on ultrasound her uterus was much smaller in addition to the smaller fibroid size  The patient and I will discuss this further at the time of her ultrasound and in the meantime she will placed back on Provera to help with the bleeding

## 2021-02-04 ENCOUNTER — ULTRASOUND (OUTPATIENT)
Dept: OBGYN CLINIC | Facility: CLINIC | Age: 51
End: 2021-02-04
Payer: COMMERCIAL

## 2021-02-04 ENCOUNTER — OFFICE VISIT (OUTPATIENT)
Dept: OBGYN CLINIC | Facility: CLINIC | Age: 51
End: 2021-02-04
Payer: COMMERCIAL

## 2021-02-04 VITALS
WEIGHT: 158 LBS | DIASTOLIC BLOOD PRESSURE: 72 MMHG | BODY MASS INDEX: 29.08 KG/M2 | SYSTOLIC BLOOD PRESSURE: 118 MMHG | HEIGHT: 62 IN

## 2021-02-04 DIAGNOSIS — N92.1 MENORRHAGIA WITH IRREGULAR CYCLE: Primary | ICD-10-CM

## 2021-02-04 DIAGNOSIS — D25.1 LEIOMYOMA, INTRAMURAL: ICD-10-CM

## 2021-02-04 DIAGNOSIS — D21.9 FIBROIDS: Primary | ICD-10-CM

## 2021-02-04 DIAGNOSIS — N83.202 LEFT OVARIAN CYST: ICD-10-CM

## 2021-02-04 PROCEDURE — 76830 TRANSVAGINAL US NON-OB: CPT | Performed by: OBSTETRICS & GYNECOLOGY

## 2021-02-04 PROCEDURE — 1036F TOBACCO NON-USER: CPT | Performed by: OBSTETRICS & GYNECOLOGY

## 2021-02-04 PROCEDURE — 99214 OFFICE O/P EST MOD 30 MIN: CPT | Performed by: OBSTETRICS & GYNECOLOGY

## 2021-02-04 NOTE — H&P (VIEW-ONLY)
CC:    Heavy uterine bleeding    HPI: Fabienne Sullivan presents for ultrasound and discussion in regards to her heavy uterine bleeding  This patient who is known uterine fibroids, after ultrasound today had increase in 3 out of the 5 fibroids  They range in size from 3-to 4 cm  The patient also notes pelvic pain and cramping  I offered her the option of medication versus ablation versus hysterectomy and the patient definitely wishes to pursue hysterectomy  The patient and I reviewed options for hysterectomy and the patient is comfortable with an LS-BS  The patient and I reviewed the risks and benefits, pros and cons of the procedure, including alternatives  A pamphlet, going over the procedure was also given to the patient  The patient was given plenty of opportunity to ask questions, she is comfortable with the recommendations, she was personally consented by myself  Past Medical History:  Past Medical History:   Diagnosis Date    Back pain     Chlamydia         Dental disease     Ear problems     Fatigue     Hyperlipidemia     no meds at present    Iron deficiency anemia     iron and B12 def, has had iron infusions    Nasal congestion     Palpitation     Sleep apnea     Tonsillitis     Varicella     Vitamin B12 deficiency     Vitamin D deficiency        Past Surgical History:  Past Surgical History:   Procedure Laterality Date     SECTION      TONSILLECTOMY Bilateral 2019    Procedure: TONSILLECTOMY;  Surgeon: Gerber Caba MD;  Location: [address] [place] [address];  Service: ENT    TUBAL LIGATION         Past OB/Gyn History:  Menstrual cycles are irregular and accompanied by heavy bleeding  Denies any history of sexually transmitted infection  No history of abnormal pap smears   Her last pap smear was  and normal     ALLERGIES: No Known Allergies    MEDS:   Current Outpatient Medications:     ergocalciferol (VITAMIN D2) 50,000 units    medroxyPROGESTERone (PROVERA) 5 mg tablet    RA VITAMIN B-12 TR 1000 MCG [facility]    Family History:  Family History   Problem Relation Age of Onset    Diabetes Mother     Cancer Mother     Stroke Mother     Hypertension Mother     Lung cancer Mother     Coronary artery disease Mother    Dony Delio Hyperlipidemia Family     Alcohol abuse Father     Cirrhosis Father    Dony Delio Diabetes Sister     Stroke Sister     Hyperlipidemia Brother    Dony Delio Hyperlipidemia Sister     Diabetes Sister        Social History:  Social History     Socioeconomic History    Marital status: Single     Spouse name: Not on file    Number of children: Not on file    Years of education: Not on file    Highest education level: Not on file   Occupational History    Not on file   Social Needs    Financial resource strain: Not on file    Food insecurity     Worry: Not on file     Inability: Not on file    Transportation needs     Medical: Not on file     Non-medical: Not on file   Tobacco Use    Smoking status: Former Smoker     Quit date: 10/3/2015     Years since quittin 3    Smokeless tobacco: Never Used   Substance and Sexual Activity    Alcohol use: Not Currently     Comment: social    Drug use: Not Currently     Types: Marijuana     Comment: quit in     Sexual activity: Not Currently     Partners: Male     Birth control/protection: Female Sterilization   Lifestyle    Physical activity     Days per week: Not on file     Minutes per session: Not on file    Stress: Not on file   Relationships    Social connections     Talks on phone: Not on file     Gets together: Not on file     Attends Mormon service: Not on file     Active member of club or organization: Not on file     Attends meetings of clubs or organizations: Not on file     Relationship status: Not on file    Intimate partner violence     Fear of current or ex partner: Not on file     Emotionally abused: Not on file     Physically abused: Not on file     Forced sexual activity: Not on file   Other Topics Concern    Not on file   Social History Narrative    Caffeine use    Uses safety equipment seatbelt         Review of Systems:  Gen:   Denies fatigue, chills, nausea, vomiting, fever  Skin: No rashes or discolorations of any concern  RESP: Denies SOB, no cough  CV: Denies chest pain or palpitations  Breasts: Denies masses, pain, skin changes and nipple discharge  GI: Denies abdominal pain, heartburn, nausea, vomiting, changes in bowel habits  : Denies dysuria, frequency, CVA tenderness, incontinence and hematuria  Genitalia: Denies abnormal vaginal discharge, external lesions, rashes, pelvic pain, pressure, but positive for abnormal bleeding  Rectal:  Denies pain, bleeding, hemorrhoids,    Physical Exam:  /72   Ht 5' 1 5" (1 562 m)   Wt 71 7 kg (158 lb)   BMI 29 37 kg/m²    Gen: The patient was alert and oriented x3, pleasant well-appearing female in no acute distress  Neck:  Unremarkable, no lymphadenopathy, no thyromegaly, or tenderness  CV:  RRR, no murmurs  Resp:  Clear to auscultation bilaterally, no wheezing  Abd:  Soft, nontender, nondistended, no masses or organomegaly  Back:  No CVA tenderness, no tenderness to palpation along spine  Pelvic:  Normal appearing external female genitalia, no visible lesions, no rashes  Vagina is free of discharge, normal vaginal epithelium, no abnormal  lesions, no evidence of prolapse anteriorly or posteriorly  Normal appearing cervix, mobile and nontender  Uterus is enlarged to approximately 6 week size, multi fibroid, mobile and, nontender  No palpable adnexal masses or tenderness  No anoperineal lesions  Skin:  No concerning lesions  Extremeties: No edema      Assessment & Plan:   1  Menorrhagia and fibroid uterus, plan is for a laparoscopic supracervical hysterectomy with removal of both fallopian tubes

## 2021-02-04 NOTE — PROGRESS NOTES
CC:    Heavy uterine bleeding    HPI: Julieth Patrick presents for ultrasound and discussion in regards to her heavy uterine bleeding  This patient who is known uterine fibroids, after ultrasound today had increase in 3 out of the 5 fibroids  They range in size from 3-to 4 cm  The patient also notes pelvic pain and cramping  I offered her the option of medication versus ablation versus hysterectomy and the patient definitely wishes to pursue hysterectomy  The patient and I reviewed options for hysterectomy and the patient is comfortable with an LSH-BS  The patient and I reviewed the risks and benefits, pros and cons of the procedure, including alternatives  A pamphlet, going over the procedure was also given to the patient  The patient was given plenty of opportunity to ask questions, she is comfortable with the recommendations, she was personally consented by myself  Past Medical History:  Past Medical History:   Diagnosis Date    Back pain     Chlamydia         Dental disease     Ear problems     Fatigue     Hyperlipidemia     no meds at present    Iron deficiency anemia     iron and B12 def, has had iron infusions    Nasal congestion     Palpitation     Sleep apnea     Tonsillitis     Varicella     Vitamin B12 deficiency     Vitamin D deficiency        Past Surgical History:  Past Surgical History:   Procedure Laterality Date     SECTION      TONSILLECTOMY Bilateral 2019    Procedure: TONSILLECTOMY;  Surgeon: Bradford Martinez MD;  Location: [address] [place] [address];  Service: ENT    TUBAL LIGATION         Past OB/Gyn History:  Menstrual cycles are irregular and accompanied by heavy bleeding  Denies any history of sexually transmitted infection  No history of abnormal pap smears   Her last pap smear was  and normal     ALLERGIES: No Known Allergies    MEDS:   Current Outpatient Medications:     ergocalciferol (VITAMIN D2) 50,000 units    medroxyPROGESTERone (PROVERA) 5 mg tablet    RA VITAMIN B-12 TR 1000 MCG [facility]    Family History:  Family History   Problem Relation Age of Onset    Diabetes Mother     Cancer Mother     Stroke Mother     Hypertension Mother     Lung cancer Mother     Coronary artery disease Mother    Marlon Healy Hyperlipidemia Family     Alcohol abuse Father     Cirrhosis Father    Marlon Healy Diabetes Sister     Stroke Sister     Hyperlipidemia Brother    Marlon Healy Hyperlipidemia Sister     Diabetes Sister        Social History:  Social History     Socioeconomic History    Marital status: Single     Spouse name: Not on file    Number of children: Not on file    Years of education: Not on file    Highest education level: Not on file   Occupational History    Not on file   Social Needs    Financial resource strain: Not on file    Food insecurity     Worry: Not on file     Inability: Not on file    Transportation needs     Medical: Not on file     Non-medical: Not on file   Tobacco Use    Smoking status: Former Smoker     Quit date: 10/3/2015     Years since quittin 3    Smokeless tobacco: Never Used   Substance and Sexual Activity    Alcohol use: Not Currently     Comment: social    Drug use: Not Currently     Types: Marijuana     Comment: quit in     Sexual activity: Not Currently     Partners: Male     Birth control/protection: Female Sterilization   Lifestyle    Physical activity     Days per week: Not on file     Minutes per session: Not on file    Stress: Not on file   Relationships    Social connections     Talks on phone: Not on file     Gets together: Not on file     Attends Sikh service: Not on file     Active member of club or organization: Not on file     Attends meetings of clubs or organizations: Not on file     Relationship status: Not on file    Intimate partner violence     Fear of current or ex partner: Not on file     Emotionally abused: Not on file     Physically abused: Not on file     Forced sexual activity: Not on file   Other Topics Concern    Not on file   Social History Narrative    Caffeine use    Uses safety equipment seatbelt         Review of Systems:  Gen:   Denies fatigue, chills, nausea, vomiting, fever  Skin: No rashes or discolorations of any concern  RESP: Denies SOB, no cough  CV: Denies chest pain or palpitations  Breasts: Denies masses, pain, skin changes and nipple discharge  GI: Denies abdominal pain, heartburn, nausea, vomiting, changes in bowel habits  : Denies dysuria, frequency, CVA tenderness, incontinence and hematuria  Genitalia: Denies abnormal vaginal discharge, external lesions, rashes, pelvic pain, pressure, but positive for abnormal bleeding  Rectal:  Denies pain, bleeding, hemorrhoids,    Physical Exam:  /72   Ht 5' 1 5" (1 562 m)   Wt 71 7 kg (158 lb)   BMI 29 37 kg/m²    Gen: The patient was alert and oriented x3, pleasant well-appearing female in no acute distress  Neck:  Unremarkable, no lymphadenopathy, no thyromegaly, or tenderness  CV:  RRR, no murmurs  Resp:  Clear to auscultation bilaterally, no wheezing  Abd:  Soft, nontender, nondistended, no masses or organomegaly  Back:  No CVA tenderness, no tenderness to palpation along spine  Pelvic:  Normal appearing external female genitalia, no visible lesions, no rashes  Vagina is free of discharge, normal vaginal epithelium, no abnormal  lesions, no evidence of prolapse anteriorly or posteriorly  Normal appearing cervix, mobile and nontender  Uterus is enlarged to approximately 6 week size, multi fibroid, mobile and, nontender  No palpable adnexal masses or tenderness  No anoperineal lesions  Skin:  No concerning lesions  Extremeties: No edema      Assessment & Plan:   1  Menorrhagia and fibroid uterus, plan is for a laparoscopic supracervical hysterectomy with removal of both fallopian tubes

## 2021-02-04 NOTE — PROGRESS NOTES
AMB US Pelvic Non OB    Date/Time: 2/4/2021 8:04 AM  Performed by: Jeet Barclay  Authorized by: Camilla Lunsford MD   Universal Protocol:  Patient identity confirmed: verbally with patient      Procedure details:     Technique:  Transvaginal US, Non-OB    Position: lithotomy exam    Uterine findings:     Length (cm): 11 69    Height (cm):  9 04    Width (cm):  10 53    Endometrial stripe: identified      Endometrium thickness (mm):  25 9  Left ovary findings:     Left ovary:  Visualized    Length (cm): 2 72    Height (cm): 1 73    Width (cm): 1 94  Right ovary findings:     Right ovary:  Visualized    Length (cm): 5 28    Height (cm): 4 02    Width (cm): 4 55  Other findings:     Free pelvic fluid: identified    Post-Procedure Details:     Impression:   Anteverted uterus demonstrates multiple fibroids  Largest are anterior subserosal 3 94cm (previously 3 0cm), left posterior intramural 3 7cm (previously 3 7cm; stable), intramural fundal 4 23cm (previously 3 1cm) with submucosal extension, and anterior intramural 3 3cm (previously 3 0cm)  The right ovary demonstrates a simple cyst 4 4cm  The left ovary appears within normal limits  Minimal free fluid is noted within the cul de sac  Tolerance: Tolerated well, no immediate complications    Complications: no complications    Additional Procedure Comments:      [employer] F8 E8C-RS transvaginal transducer Serial # [id number] was used to perform the examination today and subsequently followed with high level disinfection utilizing Trophon EPR procedure  Ultrasound performed at:     [id number] [facility]  [address] [facility], [facility] [address] [facility] [place]  Phone:  [phone]  Fax:  [phone]

## 2021-02-15 ENCOUNTER — TELEPHONE (OUTPATIENT)
Dept: OBGYN CLINIC | Facility: CLINIC | Age: 51
End: 2021-02-15

## 2021-02-15 NOTE — TELEPHONE ENCOUNTER
Per Mary on 2/15/2021 at 1:55 pm no prior authorization is needed for CPT code 97949 being performed on 3/3/2021      Ref # [id number]

## 2021-02-19 ENCOUNTER — TELEPHONE (OUTPATIENT)
Dept: OBGYN CLINIC | Facility: CLINIC | Age: 51
End: 2021-02-19

## 2021-02-19 NOTE — TELEPHONE ENCOUNTER
Patient called asking if she can go to [facility] for her blood work  Advised okay if her insurance allows

## 2021-02-24 DIAGNOSIS — Z01.818 PRE-OP TESTING: ICD-10-CM

## 2021-02-24 PROCEDURE — U0005 INFEC AGEN DETEC AMPLI PROBE: HCPCS | Performed by: OBSTETRICS & GYNECOLOGY

## 2021-02-24 PROCEDURE — U0003 INFECTIOUS AGENT DETECTION BY NUCLEIC ACID (DNA OR RNA); SEVERE ACUTE RESPIRATORY SYNDROME CORONAVIRUS 2 (SARS-COV-2) (CORONAVIRUS DISEASE [COVID-19]), AMPLIFIED PROBE TECHNIQUE, MAKING USE OF HIGH THROUGHPUT TECHNOLOGIES AS DESCRIBED BY CMS-2020-01-R: HCPCS | Performed by: OBSTETRICS & GYNECOLOGY

## 2021-02-25 ENCOUNTER — TELEPHONE (OUTPATIENT)
Dept: OBGYN CLINIC | Facility: CLINIC | Age: 51
End: 2021-02-25

## 2021-02-25 LAB
ABO GROUP BLD: NORMAL
ALBUMIN SERPL-MCNC: 4.2 G/DL (ref 3.6–5.1)
ALBUMIN/GLOB SERPL: 1.6 (CALC) (ref 1–2.5)
ALP SERPL-CCNC: 46 U/L (ref 37–153)
ALT SERPL-CCNC: 11 U/L (ref 6–29)
AST SERPL-CCNC: 15 U/L (ref 10–35)
BASOPHILS # BLD AUTO: 62 CELLS/UL (ref 0–200)
BASOPHILS NFR BLD AUTO: 1 %
BILIRUB SERPL-MCNC: 0.4 MG/DL (ref 0.2–1.2)
BUN SERPL-MCNC: 13 MG/DL (ref 7–25)
BUN/CREAT SERPL: NORMAL (CALC) (ref 6–22)
CALCIUM SERPL-MCNC: 9.3 MG/DL (ref 8.6–10.4)
CHLORIDE SERPL-SCNC: 104 MMOL/L (ref 98–110)
CO2 SERPL-SCNC: 27 MMOL/L (ref 20–32)
CREAT SERPL-MCNC: 0.72 MG/DL (ref 0.5–1.05)
EOSINOPHIL # BLD AUTO: 37 CELLS/UL (ref 15–500)
EOSINOPHIL NFR BLD AUTO: 0.6 %
ERYTHROCYTE [DISTWIDTH] IN BLOOD BY AUTOMATED COUNT: 17.1 % (ref 11–15)
EST. AVERAGE GLUCOSE BLD GHB EST-MCNC: 103 (CALC)
EST. AVERAGE GLUCOSE BLD GHB EST-SCNC: 5.7 (CALC)
GLOBULIN SER CALC-MCNC: 2.6 G/DL (CALC) (ref 1.9–3.7)
GLUCOSE SERPL-MCNC: 85 MG/DL (ref 65–99)
HBA1C MFR BLD: 5.2 % OF TOTAL HGB
HCT VFR BLD AUTO: 22.4 % (ref 35–45)
HGB BLD-MCNC: 6.4 G/DL (ref 11.7–15.5)
LYMPHOCYTES # BLD AUTO: 1637 CELLS/UL (ref 850–3900)
LYMPHOCYTES NFR BLD AUTO: 26.4 %
MCH RBC QN AUTO: 20.9 PG (ref 27–33)
MCHC RBC AUTO-ENTMCNC: 28.6 G/DL (ref 32–36)
MCV RBC AUTO: 73.2 FL (ref 80–100)
MONOCYTES # BLD AUTO: 446 CELLS/UL (ref 200–950)
MONOCYTES NFR BLD AUTO: 7.2 %
NEUTROPHILS # BLD AUTO: 4018 CELLS/UL (ref 1500–7800)
NEUTROPHILS NFR BLD AUTO: 64.8 %
PLATELET # BLD AUTO: 463 THOUSAND/UL (ref 140–400)
PMV BLD REES-ECKER: 10.3 FL (ref 7.5–12.5)
POTASSIUM SERPL-SCNC: 4.4 MMOL/L (ref 3.5–5.3)
PROT SERPL-MCNC: 6.8 G/DL (ref 6.1–8.1)
RBC # BLD AUTO: 3.06 MILLION/UL (ref 3.8–5.1)
RH BLD: NORMAL
SARS-COV-2 RNA RESP QL NAA+PROBE: NEGATIVE
SL AMB EGFR AFRICAN AMERICAN: 113 ML/MIN/1.73M2
SL AMB EGFR NON AFRICAN AMERICAN: 98 ML/MIN/1.73M2
SODIUM SERPL-SCNC: 139 MMOL/L (ref 135–146)
WBC # BLD AUTO: 6.2 THOUSAND/UL (ref 3.8–10.8)

## 2021-02-25 RX ORDER — FERROUS SULFATE 325(65) MG
325 TABLET ORAL
COMMUNITY

## 2021-02-25 RX ORDER — MELATONIN
1000 DAILY
COMMUNITY

## 2021-03-02 ENCOUNTER — ANESTHESIA EVENT (OUTPATIENT)
Dept: PERIOP | Facility: HOSPITAL | Age: 51
End: 2021-03-02
Payer: COMMERCIAL

## 2021-03-02 ENCOUNTER — LAB (OUTPATIENT)
Dept: LAB | Facility: HOSPITAL | Age: 51
End: 2021-03-02
Attending: OBSTETRICS & GYNECOLOGY
Payer: COMMERCIAL

## 2021-03-02 DIAGNOSIS — Z01.818 PRE-OP TESTING: ICD-10-CM

## 2021-03-02 LAB
ABO GROUP BLD: NORMAL
BLD GP AB SCN SERPL QL: NEGATIVE
RH BLD: POSITIVE
SPECIMEN EXPIRATION DATE: NORMAL

## 2021-03-02 PROCEDURE — 86900 BLOOD TYPING SEROLOGIC ABO: CPT

## 2021-03-02 PROCEDURE — 86920 COMPATIBILITY TEST SPIN: CPT

## 2021-03-02 PROCEDURE — 86850 RBC ANTIBODY SCREEN: CPT

## 2021-03-02 PROCEDURE — 36415 COLL VENOUS BLD VENIPUNCTURE: CPT

## 2021-03-02 PROCEDURE — 86901 BLOOD TYPING SEROLOGIC RH(D): CPT

## 2021-03-03 ENCOUNTER — ANESTHESIA (OUTPATIENT)
Dept: PERIOP | Facility: HOSPITAL | Age: 51
End: 2021-03-03
Payer: COMMERCIAL

## 2021-03-03 ENCOUNTER — HOSPITAL ENCOUNTER (OUTPATIENT)
Facility: HOSPITAL | Age: 51
Discharge: HOME | End: 2021-03-04
Attending: OBSTETRICS & GYNECOLOGY | Admitting: OBSTETRICS & GYNECOLOGY
Payer: COMMERCIAL

## 2021-03-03 DIAGNOSIS — Z90.711 STATUS POST LAPAROSCOPIC SUPRACERVICAL HYSTERECTOMY: ICD-10-CM

## 2021-03-03 DIAGNOSIS — N92.1 MENORRHAGIA WITH IRREGULAR CYCLE: ICD-10-CM

## 2021-03-03 DIAGNOSIS — D21.9 FIBROIDS: ICD-10-CM

## 2021-03-03 DIAGNOSIS — G89.18 POSTOPERATIVE PAIN: Primary | ICD-10-CM

## 2021-03-03 LAB
EXT PREGNANCY TEST URINE: NEGATIVE
EXT. CONTROL: NORMAL
HCT VFR BLD AUTO: 30.7 % (ref 34.8–46.1)
HCT VFR BLD AUTO: 31.7 % (ref 34.8–46.1)
HGB BLD-MCNC: 9.3 G/DL (ref 11.5–15.4)
HGB BLD-MCNC: 9.5 G/DL (ref 11.5–15.4)

## 2021-03-03 PROCEDURE — 81025 URINE PREGNANCY TEST: CPT | Performed by: OBSTETRICS & GYNECOLOGY

## 2021-03-03 PROCEDURE — P9016 RBC LEUKOCYTES REDUCED: HCPCS

## 2021-03-03 PROCEDURE — 85014 HEMATOCRIT: CPT | Performed by: NURSE ANESTHETIST, CERTIFIED REGISTERED

## 2021-03-03 PROCEDURE — 85014 HEMATOCRIT: CPT | Performed by: OBSTETRICS & GYNECOLOGY

## 2021-03-03 PROCEDURE — 88307 TISSUE EXAM BY PATHOLOGIST: CPT | Performed by: PATHOLOGY

## 2021-03-03 PROCEDURE — 58541 LSH UTERUS 250 G OR LESS: CPT | Performed by: OBSTETRICS & GYNECOLOGY

## 2021-03-03 PROCEDURE — 85018 HEMOGLOBIN: CPT | Performed by: OBSTETRICS & GYNECOLOGY

## 2021-03-03 PROCEDURE — 85018 HEMOGLOBIN: CPT | Performed by: NURSE ANESTHETIST, CERTIFIED REGISTERED

## 2021-03-03 RX ORDER — ONDANSETRON 2 MG/ML
4 INJECTION INTRAMUSCULAR; INTRAVENOUS EVERY 6 HOURS PRN
Status: DISCONTINUED | OUTPATIENT
Start: 2021-03-03 | End: 2021-03-04 | Stop reason: HOSPADM

## 2021-03-03 RX ORDER — MIDAZOLAM HYDROCHLORIDE 2 MG/2ML
INJECTION, SOLUTION INTRAMUSCULAR; INTRAVENOUS AS NEEDED
Status: DISCONTINUED | OUTPATIENT
Start: 2021-03-03 | End: 2021-03-03

## 2021-03-03 RX ORDER — OXYCODONE HYDROCHLORIDE 5 MG/1
10 TABLET ORAL EVERY 4 HOURS PRN
Status: DISCONTINUED | OUTPATIENT
Start: 2021-03-03 | End: 2021-03-03

## 2021-03-03 RX ORDER — ROCURONIUM BROMIDE 10 MG/ML
INJECTION, SOLUTION INTRAVENOUS AS NEEDED
Status: DISCONTINUED | OUTPATIENT
Start: 2021-03-03 | End: 2021-03-03

## 2021-03-03 RX ORDER — OXYCODONE HYDROCHLORIDE AND ACETAMINOPHEN 5; 325 MG/1; MG/1
1 TABLET ORAL EVERY 6 HOURS SCHEDULED
Status: DISCONTINUED | OUTPATIENT
Start: 2021-03-03 | End: 2021-03-04 | Stop reason: HOSPADM

## 2021-03-03 RX ORDER — OXYCODONE HYDROCHLORIDE 5 MG/1
5 TABLET ORAL EVERY 4 HOURS PRN
Status: DISCONTINUED | OUTPATIENT
Start: 2021-03-03 | End: 2021-03-03

## 2021-03-03 RX ORDER — SODIUM CHLORIDE 9 MG/ML
125 INJECTION, SOLUTION INTRAVENOUS CONTINUOUS
Status: DISCONTINUED | OUTPATIENT
Start: 2021-03-03 | End: 2021-03-03 | Stop reason: ALTCHOICE

## 2021-03-03 RX ORDER — FENTANYL CITRATE 50 UG/ML
50 INJECTION, SOLUTION INTRAMUSCULAR; INTRAVENOUS
Status: DISCONTINUED | OUTPATIENT
Start: 2021-03-03 | End: 2021-03-03 | Stop reason: HOSPADM

## 2021-03-03 RX ORDER — IBUPROFEN 600 MG/1
600 TABLET ORAL EVERY 6 HOURS PRN
Status: DISCONTINUED | OUTPATIENT
Start: 2021-03-03 | End: 2021-03-03

## 2021-03-03 RX ORDER — CEFAZOLIN SODIUM 1 G/50ML
1000 SOLUTION INTRAVENOUS ONCE
Status: COMPLETED | OUTPATIENT
Start: 2021-03-03 | End: 2021-03-03

## 2021-03-03 RX ORDER — ONDANSETRON 2 MG/ML
INJECTION INTRAMUSCULAR; INTRAVENOUS AS NEEDED
Status: DISCONTINUED | OUTPATIENT
Start: 2021-03-03 | End: 2021-03-03

## 2021-03-03 RX ORDER — DEXAMETHASONE SODIUM PHOSPHATE 4 MG/ML
INJECTION, SOLUTION INTRA-ARTICULAR; INTRALESIONAL; INTRAMUSCULAR; INTRAVENOUS; SOFT TISSUE AS NEEDED
Status: DISCONTINUED | OUTPATIENT
Start: 2021-03-03 | End: 2021-03-03

## 2021-03-03 RX ORDER — MAGNESIUM HYDROXIDE 1200 MG/15ML
LIQUID ORAL AS NEEDED
Status: DISCONTINUED | OUTPATIENT
Start: 2021-03-03 | End: 2021-03-03 | Stop reason: HOSPADM

## 2021-03-03 RX ORDER — FENTANYL CITRATE 50 UG/ML
INJECTION, SOLUTION INTRAMUSCULAR; INTRAVENOUS AS NEEDED
Status: DISCONTINUED | OUTPATIENT
Start: 2021-03-03 | End: 2021-03-03

## 2021-03-03 RX ORDER — SODIUM CHLORIDE, SODIUM LACTATE, POTASSIUM CHLORIDE, CALCIUM CHLORIDE 600; 310; 30; 20 MG/100ML; MG/100ML; MG/100ML; MG/100ML
125 INJECTION, SOLUTION INTRAVENOUS CONTINUOUS
Status: DISCONTINUED | OUTPATIENT
Start: 2021-03-03 | End: 2021-03-03

## 2021-03-03 RX ORDER — SODIUM CHLORIDE 9 MG/ML
INJECTION, SOLUTION INTRAVENOUS CONTINUOUS PRN
Status: DISCONTINUED | OUTPATIENT
Start: 2021-03-03 | End: 2021-03-03

## 2021-03-03 RX ORDER — OXYCODONE HYDROCHLORIDE AND ACETAMINOPHEN 5; 325 MG/1; MG/1
1 TABLET ORAL EVERY 6 HOURS PRN
Qty: 12 TABLET | Refills: 0 | Status: SHIPPED | OUTPATIENT
Start: 2021-03-03 | End: 2021-03-06

## 2021-03-03 RX ORDER — HYDROMORPHONE HCL/PF 1 MG/ML
SYRINGE (ML) INJECTION AS NEEDED
Status: DISCONTINUED | OUTPATIENT
Start: 2021-03-03 | End: 2021-03-03

## 2021-03-03 RX ORDER — ONDANSETRON 2 MG/ML
4 INJECTION INTRAMUSCULAR; INTRAVENOUS EVERY 6 HOURS PRN
Status: DISCONTINUED | OUTPATIENT
Start: 2021-03-03 | End: 2021-03-03 | Stop reason: HOSPADM

## 2021-03-03 RX ORDER — NEOSTIGMINE METHYLSULFATE 1 MG/ML
INJECTION INTRAVENOUS AS NEEDED
Status: DISCONTINUED | OUTPATIENT
Start: 2021-03-03 | End: 2021-03-03

## 2021-03-03 RX ORDER — GLYCOPYRROLATE 0.2 MG/ML
INJECTION INTRAMUSCULAR; INTRAVENOUS AS NEEDED
Status: DISCONTINUED | OUTPATIENT
Start: 2021-03-03 | End: 2021-03-03

## 2021-03-03 RX ORDER — ACETAMINOPHEN 325 MG/1
975 TABLET ORAL EVERY 8 HOURS PRN
Status: DISCONTINUED | OUTPATIENT
Start: 2021-03-03 | End: 2021-03-03

## 2021-03-03 RX ORDER — PROPOFOL 10 MG/ML
INJECTION, EMULSION INTRAVENOUS AS NEEDED
Status: DISCONTINUED | OUTPATIENT
Start: 2021-03-03 | End: 2021-03-03

## 2021-03-03 RX ORDER — HYDROMORPHONE HCL/PF 1 MG/ML
0.5 SYRINGE (ML) INJECTION
Status: DISCONTINUED | OUTPATIENT
Start: 2021-03-03 | End: 2021-03-03

## 2021-03-03 RX ADMIN — FENTANYL CITRATE 100 MCG: 50 INJECTION, SOLUTION INTRAMUSCULAR; INTRAVENOUS at 12:56

## 2021-03-03 RX ADMIN — GLYCOPYRROLATE 0.4 MG: 0.2 INJECTION, SOLUTION INTRAMUSCULAR; INTRAVENOUS at 15:42

## 2021-03-03 RX ADMIN — HYDROMORPHONE HYDROCHLORIDE 0.5 MG: 1 INJECTION, SOLUTION INTRAMUSCULAR; INTRAVENOUS; SUBCUTANEOUS at 14:03

## 2021-03-03 RX ADMIN — PROPOFOL 200 MG: 10 INJECTION, EMULSION INTRAVENOUS at 12:56

## 2021-03-03 RX ADMIN — FENTANYL CITRATE 50 MCG: 50 INJECTION, SOLUTION INTRAMUSCULAR; INTRAVENOUS at 13:25

## 2021-03-03 RX ADMIN — SODIUM CHLORIDE: 0.9 INJECTION, SOLUTION INTRAVENOUS at 13:00

## 2021-03-03 RX ADMIN — ONDANSETRON 4 MG: 2 INJECTION INTRAMUSCULAR; INTRAVENOUS at 15:27

## 2021-03-03 RX ADMIN — FENTANYL CITRATE 50 MCG: 50 INJECTION, SOLUTION INTRAMUSCULAR; INTRAVENOUS at 13:27

## 2021-03-03 RX ADMIN — ROCURONIUM BROMIDE 50 MG: 10 INJECTION, SOLUTION INTRAVENOUS at 12:56

## 2021-03-03 RX ADMIN — OXYCODONE HYDROCHLORIDE AND ACETAMINOPHEN 1 TABLET: 5; 325 TABLET ORAL at 23:46

## 2021-03-03 RX ADMIN — OXYCODONE HYDROCHLORIDE AND ACETAMINOPHEN 1 TABLET: 5; 325 TABLET ORAL at 19:28

## 2021-03-03 RX ADMIN — DEXAMETHASONE SODIUM PHOSPHATE 4 MG: 4 INJECTION INTRA-ARTICULAR; INTRALESIONAL; INTRAMUSCULAR; INTRAVENOUS; SOFT TISSUE at 13:03

## 2021-03-03 RX ADMIN — MIDAZOLAM 2 MG: 1 INJECTION INTRAMUSCULAR; INTRAVENOUS at 12:48

## 2021-03-03 RX ADMIN — FENTANYL CITRATE 50 MCG: 50 INJECTION INTRAMUSCULAR; INTRAVENOUS at 16:28

## 2021-03-03 RX ADMIN — SODIUM CHLORIDE 125 ML/HR: 0.9 INJECTION, SOLUTION INTRAVENOUS at 16:52

## 2021-03-03 RX ADMIN — NEOSTIGMINE METHYLSULFATE 3 MG: 1 INJECTION INTRAVENOUS at 15:42

## 2021-03-03 RX ADMIN — HYDROMORPHONE HYDROCHLORIDE 0.5 MG: 1 INJECTION, SOLUTION INTRAMUSCULAR; INTRAVENOUS; SUBCUTANEOUS at 14:15

## 2021-03-03 RX ADMIN — FENTANYL CITRATE 50 MCG: 50 INJECTION INTRAMUSCULAR; INTRAVENOUS at 16:17

## 2021-03-03 RX ADMIN — SODIUM CHLORIDE: 0.9 INJECTION, SOLUTION INTRAVENOUS at 12:44

## 2021-03-03 RX ADMIN — CEFAZOLIN SODIUM 1000 MG: 1 SOLUTION INTRAVENOUS at 12:43

## 2021-03-03 NOTE — PLAN OF CARE
Problem: Potential for Falls  Goal: Patient will remain free of falls  Description: INTERVENTIONS:  - Assess patient frequently for physical needs  -  Identify cognitive and physical deficits and behaviors that affect risk of falls    -  [place] fall precautions as indicated by assessment   - Educate patient/family on patient safety including physical limitations  - Instruct patient to call for assistance with activity based on assessment  - Modify environment to reduce risk of injury  - Consider OT/PT consult to assist with strengthening/mobility  Outcome: Progressing

## 2021-03-03 NOTE — OP NOTE
OPERATIVE REPORT  PATIENT NAME: Cuba Palma    :  1970  MRN: [id number]  Pt Location: [facility] OR ROOM 04    SURGERY DATE: 3/3/2021    Surgeon(s) and Role:     Alycia Martinez MD - Primary     * Michelle Reinoso DO - Assisting    Preop Diagnosis:  Menorrhagia with irregular cycle [N92 1]  Fibroids [D21 9]    Post-Op Diagnosis Codes:     * Menorrhagia with irregular cycle [N92 1]     * Fibroids [D21 9]    Procedure(s) (LRB):  L S H , DORIAN (N/A)  CYSTOSCOPY (N/A)    Specimen(s):  ID Type Source Tests Collected by Time Destination   1 : Uterus Tissue Uterus TISSUE EXAM Gabbie Madrid MD 3/3/2021 1333        Estimated Blood Loss:   200 mL    Drains:  [REMOVED] Urethral Catheter Non-latex 16 Fr  (Removed)   Number of days: 0       Anesthesia Type:   General    Operative Indications:  Menorrhagia with irregular cycle [N92 1]  Fibroids [D21 9]    Operative Findings:  Grossly normal external genitalia  Parous cervix with minimal descensus  Globular uterus, dominant lower anterior subserosal fibroid  Bilateral tubes with dense adhesions to the bilateral pelvic sidewall  Bilateral ovaries with small simple cysts, otherwise grossly normal  Adhesive disease from the anterior abdominal wall to underlying omentum    Cystoscopy with intact bladder dome    Complications:   None     Procedure and Technique:    Prior to procedure, patient was transfused with 2 units packed red blood cells given preoperative hemoglobin of 6 4  Subsequent intraoperative hemoglobin was 9 3 following blood transfusion  Appropriate preoperative antibiotics chosen per ACOG guidelines were given  Bilateral SCDs were placed in the lower extremities for DVT prevention prior to the institution of anesthesia  No bladder, ureteral, viscus, or solid organ injury were noted at the end of the procedure  The patient was identified in the holding area by the operating room staff and attending physician   She was taken to the operating room where anesthesia was instituted without complications  She was placed in the dorsal lithotomy position with the legs in [address] [place] [address] with care taken to avoid excessive flexion or extension of her lower extremities  The patient was prepped and draped in the usual sterile fashion  A uterine manipulator was placed  A Decker catheter was inserted  Next, a subumbilical incision was made to accommodate a 10 mm Trocar  Under direct laparoscopic visualization, the peritoneum was entered  Once entry into the abdominal cavity was confirmed, the abdomen was insufflated with CO2 gas to 15 mmHg  The patient was then placed in Trendelenburg for better visualization of the pelvis  Next, a 5 mm trocar was inserted into the left lateral quadrant under direct visualization  Then a 5 mm trocar was inserted into the right lateral quadrant under direct visualization  First, a survey of the cavity was performed, and we confirmed the above-mentioned findings  Dense adhesions from the anterior abdominal wall to the underlying omentum were taken down using the Olympus Sonosurg device  Due to adhesive disease about the bilateral fallopian tubes, decision was made to not perform bilateral salpingectomy  Next, using the Olympus Sonosurg device, the round ligament was opened on the right side, and the anterior leaf of the broad ligament was dissected toward the level of the internal cervical os to develop the bladder flap  The left round ligament was then entered and the utero-ovarian ligament coagulated and transected  The bladder flap was completed from the left size and the uterine arteries were skeletonized, coagulated and transected  The right uterine arteries were coagulated and transected      Next, the bipolar Susy loop was introduced and the loop tightened at the level of the cervical canal  Once the loop was cleared of bowel and other major structures, the loop was activated and the corpus of the uterus was amputated without difficulty  Next, a 2 5-cm incision was created at the level of the suprapubic area in the midline in a horizontal fashion  To introduce the Curt retractor  A 15mm specimen bag was placed into the abdomen through this minilaparotomy incision  Next, the Gelpoint cover was placed over the retractor to maintain pneumoperitoneum  Next, the specimen was placed into the bag and the tail of the bag pulled through the suprapubic incision  The edges were rolled down and it was confirmed that the specimen was safely in the bag  The specimen was morcellated manually without difficulty with the scalpel  The bag was then removed once the specimen was removed  The Curt retractor was removed  The fascia was closed with 2-0 Vicryl suture in a running stitch  The subcutaneous tissue was re-approximated with 3-0 plain suture  The skin was closed with 4-0 Monocryl in a subcuticular fashion  A final laparoscopic survey of the pelvic cavity and we confirmed good hemostasis  The trocars were removed  The gas was allowed to escape  The skin incisions were closed with plain interrupted sutures without complications  Following completion of procedure, cystoscopy was performed, with the above findings noted  The patient tolerated the procedure well  The sponge, needle and instrument count were correct x 2  The patient tolerated the procedure well  She was awakened from anesthesia and transferred to the recovery room in stable condition  Dr Rosalio Becerra was present for the entire procedure  I was present for the entire procedure       Patient Disposition:  PACU     SIGNATURE: Micaela Reynolds DO  DATE: March 3, 2021  TIME: 3:55 PM

## 2021-03-03 NOTE — ANESTHESIA PREPROCEDURE EVALUATION
Procedure:  L S H , b/l salpingectomy (N/A Abdomen)    Relevant Problems   CARDIO   (+) Essential hypertriglyceridemia      HEMATOLOGY   (+) Iron deficiency anemia      MUSCULOSKELETAL   (+) Low back pain      NEURO/PSYCH   (+) Chronic tension-type headache, not intractable        Physical Exam    Airway    Mallampati score: II  TM Distance: >3 FB  Neck ROM: full     Dental       Cardiovascular  Rhythm: regular, Rate: normal, Cardiovascular exam normal    Pulmonary  Pulmonary exam normal Breath sounds clear to auscultation,     Other Findings        Anesthesia Plan  ASA Score- 3     Anesthesia Type- general with ASA Monitors  Additional Monitors:   Airway Plan: ETT  Comment: Hgb=6 4 on admission  2 u PRBCs ordered preop by surgeon w/T/S  I ordered H/H post-transfusion          Plan Factors-    Chart reviewed  Existing labs reviewed  Patient summary reviewed  Patient is not a current smoker  Patient instructed to abstain from smoking on day of procedure  Patient did not smoke on day of surgery  Obstructive sleep apnea risk education given perioperatively  Induction- intravenous  Postoperative Plan- Plan for postoperative opioid use  Planned trial extubation    Informed Consent- Anesthetic plan and risks discussed with patient

## 2021-03-03 NOTE — INTERVAL H&P NOTE
H&P reviewed  After examining the patient I find no changes in the patients condition since the H&P had been written      Vitals:    03/03/21 1200   BP: 136/83   Pulse: 68   Resp: 18   Temp: 98 5 °F (36 9 °C)   SpO2: 99%

## 2021-03-03 NOTE — ANESTHESIA POSTPROCEDURE EVALUATION
Post-Op Assessment Note    CV Status:  Stable    Pain management: adequate     Mental Status:  Alert and awake   Hydration Status:  Euvolemic   PONV Controlled:  Controlled   Airway Patency:  Patent      Post Op Vitals Reviewed: Yes      Staff: Anesthesiologist         No complications documented      /67 (03/03/21 1655)    Temp 98 9 °F (37 2 °C) (03/03/21 1655)    Pulse 80 (03/03/21 1655)   Resp 12 (03/03/21 1655)    SpO2 98 % (03/03/21 1655)

## 2021-03-04 VITALS
HEART RATE: 69 BPM | BODY MASS INDEX: 29.83 KG/M2 | DIASTOLIC BLOOD PRESSURE: 70 MMHG | HEIGHT: 61 IN | RESPIRATION RATE: 18 BRPM | SYSTOLIC BLOOD PRESSURE: 128 MMHG | TEMPERATURE: 97.9 F | OXYGEN SATURATION: 98 % | WEIGHT: 157.98 LBS

## 2021-03-04 PROBLEM — Z90.711 STATUS POST LAPAROSCOPIC SUPRACERVICAL HYSTERECTOMY: Status: ACTIVE | Noted: 2021-03-04

## 2021-03-04 LAB
ABO GROUP BLD BPU: NORMAL
ABO GROUP BLD BPU: NORMAL
BPU ID: NORMAL
BPU ID: NORMAL
CROSSMATCH: NORMAL
CROSSMATCH: NORMAL
ERYTHROCYTE [DISTWIDTH] IN BLOOD BY AUTOMATED COUNT: 18.4 % (ref 11.6–15.1)
HCT VFR BLD AUTO: 29.1 % (ref 34.8–46.1)
HGB BLD-MCNC: 8.7 G/DL (ref 11.5–15.4)
MCH RBC QN AUTO: 23.5 PG (ref 26.8–34.3)
MCHC RBC AUTO-ENTMCNC: 29.9 G/DL (ref 31.4–37.4)
MCV RBC AUTO: 78 FL (ref 82–98)
PLATELET # BLD AUTO: 331 THOUSANDS/UL (ref 149–390)
PMV BLD AUTO: 9.9 FL (ref 8.9–12.7)
RBC # BLD AUTO: 3.71 MILLION/UL (ref 3.81–5.12)
UNIT DISPENSE STATUS: NORMAL
UNIT DISPENSE STATUS: NORMAL
UNIT PRODUCT CODE: NORMAL
UNIT PRODUCT CODE: NORMAL
UNIT RH: NORMAL
UNIT RH: NORMAL
WBC # BLD AUTO: 11.89 THOUSAND/UL (ref 4.31–10.16)

## 2021-03-04 PROCEDURE — 85027 COMPLETE CBC AUTOMATED: CPT | Performed by: OBSTETRICS & GYNECOLOGY

## 2021-03-04 PROCEDURE — 99024 POSTOP FOLLOW-UP VISIT: CPT | Performed by: OBSTETRICS & GYNECOLOGY

## 2021-03-04 RX ORDER — ACETAMINOPHEN 325 MG/1
650 TABLET ORAL EVERY 6 HOURS PRN
Qty: 30 TABLET | Refills: 0 | Status: SHIPPED | OUTPATIENT
Start: 2021-03-04

## 2021-03-04 RX ORDER — DOCUSATE SODIUM 100 MG/1
100 CAPSULE, LIQUID FILLED ORAL 2 TIMES DAILY
Qty: 30 CAPSULE | Refills: 0 | Status: SHIPPED | OUTPATIENT
Start: 2021-03-04

## 2021-03-04 RX ADMIN — OXYCODONE HYDROCHLORIDE AND ACETAMINOPHEN 1 TABLET: 5; 325 TABLET ORAL at 05:25

## 2021-03-04 NOTE — PLAN OF CARE
Problem: Potential for Falls  Goal: Patient will remain free of falls  Description: INTERVENTIONS:  - Assess patient frequently for physical needs  -  Identify cognitive and physical deficits and behaviors that affect risk of falls    -  [place] fall precautions as indicated by assessment   - Educate patient/family on patient safety including physical limitations  - Instruct patient to call for assistance with activity based on assessment  - Modify environment to reduce risk of injury  - Consider OT/PT consult to assist with strengthening/mobility  3/4/2021 0725 by Ayo Stearns RN  Outcome: Progressing  3/3/2021 1846 by Ayo Stearns RN  Outcome: Progressing

## 2021-03-04 NOTE — DISCHARGE INSTRUCTIONS
Laparoscopic Hysterectomy   WHAT YOU NEED TO KNOW:   A hysterectomy is surgery to remove your uterus  Your ovaries, fallopian tubes, cervix, or part of your vagina may also need to be removed  The organs and tissue that will be removed depends on your medical condition  DISCHARGE INSTRUCTIONS:   Call your local emergency number (911 in the [facility]) for any of the following:   · You feel lightheaded, short of breath, and have chest pain  · You cough up blood  Call your healthcare provider or gynecologist if:   · Your arm or leg feels warm, tender, and painful  It may look swollen and red  · You have increasing abdominal or pelvic pain  · You have heavy vaginal bleeding that fills 1 or more sanitary pads in 1 hour  · You have a fever  · You have nausea or are vomiting  · You feel pain or burning when you urinate, or you have trouble urinating  · You have pus or a foul-smelling odor coming from your vagina  · Your wound is red, swollen, or draining pus  · You feel pressure in your rectum  · You have questions or concerns about your condition or care  Medicines: You may  need any of the following:  · Prescription pain medicine  may be given  Ask your healthcare provider how to take this medicine safely  · NSAIDs , such as ibuprofen, help decrease swelling, pain, and fever  NSAIDs can cause stomach bleeding or kidney problems in certain people  If you take blood thinner medicine, always ask your healthcare provider if NSAIDs are safe for you  Always read the medicine label and follow directions  · Stool softeners  help treat or prevent constipation  · Take your medicine as directed  Contact your healthcare provider if you think your medicine is not helping or if you have side effects  Tell him or her if you are allergic to any medicine  Keep a list of the medicines, vitamins, and herbs you take  Include the amounts, and when and why you take them   Bring the list or the pill bottles to follow-up visits  Carry your medicine list with you in case of an emergency  Activity:   · Wear an abdominal binder as directed  An abdominal binder will decrease pain when you move or cough  · Rest as needed  Get up and move around as directed to help prevent blood clots  Start with short walks and slowly increase the distance every day  Limit the number of times you climb stairs to 2 times each day  Plan most of your daily activities on one level of your home  · Do not lift objects heavier than 10 pounds for 6 weeks  Avoid strenuous activity for 2 weeks  · Do not strain during bowel movements  High-fiber foods and extra liquids can help you prevent constipation  Examples of high-fiber foods are fruit and bran  Prune juice and water are good liquids to drink  · Do not have sex, use tampons, or douche for up to 8 weeks  Ask your healthcare provider if it is okay to take a tub bath  · Do not go into pools or hot tubs for 6 weeks or as directed  · Ask when it is safe for you to drive, return to work, and return to other regular activities  Wound care:  Care for your abdominal incisions as directed  Carefully wash around the wound with soap and water  It is okay to let the soap and water run over your incision  Do not  scrub your incision  Dry the area and put on new, clean bandages as directed  Change your bandages when they get wet or dirty  If you have strips of medical tape, let them fall off on their own  It may take 7 to 14 days for them to fall off  Check your incision every day for redness, swelling, or pus  Deep breathing:  Take deep breaths and cough 10 times each hour  This will decrease your risk for a lung infection  Take a deep breath and hold it for as long as you can  Let the air out and then cough strongly  Deep breaths help open your airway  You may be given an incentive spirometer to help you take deep breaths   Put the plastic piece in your mouth and take a slow, deep breath, then let the air out and cough  Repeat these steps 10 times every hour  Get support: This surgery may be life-changing for you and your family  You will no longer be able to get pregnant  Sudden changes in the levels of your hormones may occur and cause mood swings and depression  You may feel angry, sad, or frightened, or cry frequently and unexpectedly  These feelings are normal  Talk to your healthcare provider about where you can get support  You can also ask if hormone replacement medicine is right for you  Follow up with your healthcare provider or gynecologist as directed: You may need to return to have stitches removed, and for other tests  Write down your questions so you remember to ask them during your visits  © Copyright [address] Information is for End User's use only and may not be sold, redistributed or otherwise used for commercial purposes  All illustrations and images included in CareNotes® are the copyrighted property of A D A M , Inc  or [facility] Donald Reyes   The above information is an  only  It is not intended as medical advice for individual conditions or treatments  Talk to your doctor, nurse or pharmacist before following any medical regimen to see if it is safe and effective for you

## 2021-03-04 NOTE — PLAN OF CARE
Problem: Potential for Falls  Goal: Patient will remain free of falls  Description: INTERVENTIONS:  - Assess patient frequently for physical needs  -  Identify cognitive and physical deficits and behaviors that affect risk of falls    -  [place] fall precautions as indicated by assessment   - Educate patient/family on patient safety including physical limitations  - Instruct patient to call for assistance with activity based on assessment  - Modify environment to reduce risk of injury  - Consider OT/PT consult to assist with strengthening/mobility  Outcome: Progressing     Problem: PAIN - ADULT  Goal: Verbalizes/displays adequate comfort level or baseline comfort level  Description: Interventions:  - Encourage patient to monitor pain and request assistance  - Assess pain using appropriate pain scale  - Administer analgesics based on type and severity of pain and evaluate response  - Implement non-pharmacological measures as appropriate and evaluate response  - Consider cultural and social influences on pain and pain management  - Notify physician/advanced practitioner if interventions unsuccessful or patient reports new pain  Outcome: Progressing     Problem: INFECTION - ADULT  Goal: Absence or prevention of progression during hospitalization  Description: INTERVENTIONS:  - Assess and monitor for signs and symptoms of infection  - Monitor lab/diagnostic results  - Monitor all insertion sites, i e  indwelling lines, tubes, and drains  - Monitor endotracheal if appropriate and nasal secretions for changes in amount and color  - [place] appropriate cooling/warming therapies per order  - Administer medications as ordered  - Instruct and encourage patient and family to use good hand hygiene technique  - Identify and instruct in appropriate isolation precautions for identified infection/condition  Outcome: Progressing  Goal: Absence of fever/infection during neutropenic period  Description: INTERVENTIONS:  - Monitor WBC    Outcome: Progressing     Problem: SAFETY ADULT  Goal: Patient will remain free of falls  Description: INTERVENTIONS:  - Assess patient frequently for physical needs  -  Identify cognitive and physical deficits and behaviors that affect risk of falls    -  [place] fall precautions as indicated by assessment   - Educate patient/family on patient safety including physical limitations  - Instruct patient to call for assistance with activity based on assessment  - Modify environment to reduce risk of injury  - Consider OT/PT consult to assist with strengthening/mobility  Outcome: Progressing  Goal: Maintain or return to baseline ADL function  Description: INTERVENTIONS:  -  Assess patient's ability to carry out ADLs; assess patient's baseline for ADL function and identify physical deficits which impact ability to perform ADLs (bathing, care of mouth/teeth, toileting, grooming, dressing, etc )  - Assess/evaluate cause of self-care deficits   - Assess range of motion  - Assess patient's mobility; develop plan if impaired  - Assess patient's need for assistive devices and provide as appropriate  - Encourage maximum independence but intervene and supervise when necessary  - Involve family in performance of ADLs  - Assess for home care needs following discharge   - Consider OT consult to assist with ADL evaluation and planning for discharge  - Provide patient education as appropriate  Outcome: Progressing  Goal: Maintain or return mobility status to optimal level  Description: INTERVENTIONS:  - Assess patient's baseline mobility status (ambulation, transfers, stairs, etc )    - Identify cognitive and physical deficits and behaviors that affect mobility  - Identify mobility aids required to assist with transfers and/or ambulation (gait belt, sit-to-stand, lift, walker, cane, etc )  - [place] fall precautions as indicated by assessment  - Record patient progress and toleration of activity level on Mobility SBAR; progress patient to next Phase/Stage  - Instruct patient to call for assistance with activity based on assessment  - Consider rehabilitation consult to assist with strengthening/weightbearing, etc   Outcome: Progressing     Problem: DISCHARGE PLANNING  Goal: Discharge to home or other facility with appropriate resources  Description: INTERVENTIONS:  - Identify barriers to discharge w/patient and caregiver  - Arrange for needed discharge resources and transportation as appropriate  - Identify discharge learning needs (meds, wound care, etc )  - Arrange for interpretive services to assist at discharge as needed  - Refer to Case Management Department for coordinating discharge planning if the patient needs post-hospital services based on physician/advanced practitioner order or complex needs related to functional status, cognitive ability, or social support system  Outcome: Progressing     Problem: Knowledge Deficit  Goal: Patient/family/caregiver demonstrates understanding of disease process, treatment plan, medications, and discharge instructions  Description: Complete learning assessment and assess knowledge base    Interventions:  - Provide teaching at level of understanding  - Provide teaching via preferred learning methods  Outcome: Progressing

## 2021-03-04 NOTE — UTILIZATION REVIEW
Initial Clinical Review    Elective    OP    surgical procedure    Age/Sex: 48 y o  female     Surgery Date:    3/3/21    Procedure: L S H , DORIAN (N/A)  CYSTOSCOPY (N/A)    Anesthesia:    General    Prior to procedure, patient was transfused with 2 units packed red blood cells given preoperative hemoglobin of 6 4  Subsequent intraoperative hemoglobin was 9 3 following blood transfusion        Operative Findings: Grossly normal external genitalia  Parous cervix with minimal descensus  Globular uterus, dominant lower anterior subserosal fibroid  Bilateral tubes with dense adhesions to the bilateral pelvic sidewall  Bilateral ovaries with small simple cysts, otherwise grossly normal  Adhesive disease from the anterior abdominal wall to underlying omentum    POD#1 Progress Note:   3/4   Anticipate d/c  Home     Admission Orders: Date/Time/Statement:   Admission Orders (From admission, onward)     Ordered        03/03/21 1803  Place in Observation  Once                   Orders Placed This Encounter   Procedures    Place in Observation     Standing Status:   Standing     Number of Occurrences:   1     Order Specific Question:   Level of Care     Answer:   Med Surg [16]     Vital Signs: /70 (BP Location: Left arm)   Pulse 69   Temp 97 9 °F (36 6 °C) (Temporal)   Resp 18   Ht 5' 1" (1 549 m)   Wt 71 7 kg (157 lb 15 7 oz)   LMP 02/09/2021   SpO2 98%   Breastfeeding No   BMI 29 85 kg/m²      Diet:    regular    Mobility:     As  tolerated    DVT Prophylaxis:    ambulating    Medications/Pain Control:   Scheduled Medications:  oxyCODONE-acetaminophen, 1 tablet, Oral, Q6H [address]      Continuous IV Infusions:     PRN Meds:  ondansetron, 4 mg, Intravenous, Q6H PRN        Network Utilization Review Department  ATTENTION: Please call with any questions or concerns to [phone] and carefully listen to the prompts so that you are directed to the right person   All voicemails are confidential   Jasson [employer] all requests for admission clinical reviews, approved or denied determinations and any other requests to dedicated fax number below belonging to the campus where the patient is receiving treatment   List of dedicated fax numbers for the Facilities:  [address] [place] [address] DENIALS (Administrative/Medical Necessity) [phone]   [address] (Maternity/NICU/Pediatrics) [phone] [address] [place] [address] ([place] Arcos) [address] [address] Renetta Andre 1481 [address] [place] [address] [phone]

## 2021-03-04 NOTE — PROGRESS NOTES
Gynecology Progress note   Vicky Cagle 48 y o  female MRN: [id number]  Unit/Bed#: E5 -01 Encounter: [id number]    Assessment and Plan:  Pt is a 52yo with menorrhagia, anemia and fibroid uterus POD #1 s/p LSH, DORIAN, cystoscopy, currently stable  Admitted overnight for pain control     1) s/p surgery  - Pain controlled with PO regimen  - F/E/N: regular diet, encouraged to increase hydration and fiber  - Encouraged ambulation    - DVT ppx: SCDs    2) Anemia  - Pre-op Hgb 6 4 on 2/24/21--> 2 uPRBCs--> 9 3 intra-op--> EBL 200mL--> PACU 9 5--> 8 7 this AM  - Pt asymptomatic   - VSS  - Continue PO supplementation  Colace rx sent due to constipation side effect    3) Dispo:   - Discharge home today    Vicky Cagle has no current complaints  Pain is controlled with PO regimen  Patient is currently voiding  She is only ambulating to restroom  Encouraged to increase ambulation today  Patient is currently passing flatus and has had no bowel movement  She is tolerating PO, and denies nausea or vomitting  She states she had a pudding last night because she did not have much of an appetite after surgery  Patient denies fever, chills, chest pain, shortness of breath, or calf tenderness  /73 (BP Location: Right arm)   Pulse 75   Temp 97 8 °F (36 6 °C) (Tympanic)   Resp 18   Ht 5' 1" (1 549 m)   Wt 71 7 kg (157 lb 15 7 oz)   LMP 02/09/2021   SpO2 98%   Breastfeeding No   BMI 29 85 kg/m²     I/O last 3 completed shifts: In: 2357 1 [I V :2000; Blood:357 1]  Out: 275 [Urine:75; Blood:200]  I/O this shift:   In: 0   Out: 650 [Urine:650]    Lab Results   Component Value Date    WBC 11 89 (H) 03/04/2021    HGB 8 7 (L) 03/04/2021    HCT 29 1 (L) 03/04/2021    MCV 78 (L) 03/04/2021     03/04/2021       Lab Results   Component Value Date    GLUCOSE 88 08/25/2015    CALCIUM 9 3 02/24/2021     08/25/2015    K 4 4 02/24/2021    CO2 27 02/24/2021     02/24/2021    BUN 13 02/24/2021 CREATININE 0 72 02/24/2021       No results found for: POCGLU    Physical Exam  Vitals signs reviewed  Constitutional:       General: She is not in acute distress  Appearance: She is normal weight  HENT:      Head: Normocephalic and atraumatic  Pulmonary:      Effort: Pulmonary effort is normal  No respiratory distress  Abdominal:      General: There is no distension  Palpations: Abdomen is soft  Tenderness: There is no abdominal tenderness  There is no guarding or rebound  Comments: Incisions are C/D/I    Genitourinary:     Comments: Pad clean   Musculoskeletal:      Comments: SCDs on   Neurological:      Mental Status: She is alert and oriented to person, place, and time  Psychiatric:         Mood and Affect: Mood normal          Behavior: Behavior normal          Thought Content:  Thought content normal          Judgment: Judgment normal          Nicki Mckeon MD  OBGYN, PGY-4  3/4/2021 6:40 AM

## 2021-03-05 ENCOUNTER — TELEPHONE (OUTPATIENT)
Dept: OBGYN CLINIC | Facility: CLINIC | Age: 51
End: 2021-03-05

## 2021-03-07 LAB
ABO GROUP BLD BPU: NORMAL
ABO GROUP BLD BPU: NORMAL
BPU ID: NORMAL
BPU ID: NORMAL
CROSSMATCH: NORMAL
CROSSMATCH: NORMAL
UNIT DISPENSE STATUS: NORMAL
UNIT DISPENSE STATUS: NORMAL
UNIT PRODUCT CODE: NORMAL
UNIT PRODUCT CODE: NORMAL
UNIT RH: NORMAL
UNIT RH: NORMAL

## 2021-03-17 ENCOUNTER — OFFICE VISIT (OUTPATIENT)
Dept: OBGYN CLINIC | Facility: CLINIC | Age: 51
End: 2021-03-17

## 2021-03-17 VITALS
HEIGHT: 61 IN | WEIGHT: 153 LBS | DIASTOLIC BLOOD PRESSURE: 74 MMHG | BODY MASS INDEX: 28.89 KG/M2 | SYSTOLIC BLOOD PRESSURE: 118 MMHG

## 2021-03-17 DIAGNOSIS — Z09 FOLLOW-UP EXAMINATION AFTER GYNECOLOGICAL SURGERY: Primary | ICD-10-CM

## 2021-03-17 PROCEDURE — 3008F BODY MASS INDEX DOCD: CPT | Performed by: OBSTETRICS & GYNECOLOGY

## 2021-03-17 PROCEDURE — 99024 POSTOP FOLLOW-UP VISIT: CPT | Performed by: OBSTETRICS & GYNECOLOGY

## 2021-03-17 NOTE — PROGRESS NOTES
Julieth Patrick is here today following an uneventful LSH  She is doing well and offers no complaints or concerns at this time  /74 (BP Location: Right arm, Patient Position: Sitting, Cuff Size: Standard)   Ht 5' 1" (1 549 m)   Wt 69 4 kg (153 lb)   BMI 28 91 kg/m²   Review of Systems:  Skin: No rashes or discolorations of any concern  RESP: Denies SOB, no cough  CV: Denies chest pain or palpitations  GI: Denies abdominal pain, heartburn, nausea, vomiting, changes in bowel habits  : Denies dysuria, frequency, CVA tenderness, incontinence and hematuria  Genitalia: Denies abnormal vaginal discharge, external lesions, rashes, pelvic pain, pressure, abnormal bleeding  Rectal:  Denies pain, bleeding, hemorrhoids,    Her incisions are healing well with no signs of disruption or infection  Her pelvic exam reveals mobile nontender cervix   We reviewed the surgical findings and the pathology from the procedure  Recommendations are resume all normal activity with the exception of strenuous type of activity for which she should wait an additional week       Patient is to return  Annual visit or as needed

## 2021-07-27 ENCOUNTER — TELEMEDICINE (OUTPATIENT)
Dept: FAMILY MEDICINE CLINIC | Facility: CLINIC | Age: 51
End: 2021-07-27
Payer: COMMERCIAL

## 2021-07-27 ENCOUNTER — TELEPHONE (OUTPATIENT)
Dept: ADMINISTRATIVE | Facility: OTHER | Age: 51
End: 2021-07-27

## 2021-07-27 DIAGNOSIS — B34.9 VIRAL INFECTION, UNSPECIFIED: Primary | ICD-10-CM

## 2021-07-27 PROCEDURE — 99213 OFFICE O/P EST LOW 20 MIN: CPT | Performed by: FAMILY MEDICINE

## 2021-07-27 NOTE — PROGRESS NOTES
COVID-19 Outpatient Progress Note    Assessment/Plan:    Problem List Items Addressed This Visit        Other    Viral infection, unspecified - Primary     No direct exposure Patient to treat symptoms with OTC meds Patient to self quarantine and self isolate until test results return         Relevant Orders    Novel Coronavirus (Covid-19),PCR [facility] - Collected at   [facility] or [facility]         Disposition:     I referred patient to one of our centralized sites for a COVID-19 swab  Patient must self quarantine at home and self isolate until test results come back She may that advil as needed for headache If she develops high fever >101 and any further chest pain or shortness of breath she needs to go to ER Discussed that if she is negative for COVID we really should discuss getting her vaccinated     I have spent 8 minutes directly with the patient  Greater than 50% of this time was spent in counseling/coordination of care regarding: diagnostic results, prognosis, instructions for management and patient and family education  Verification of patient location:    Patient is located in the following state in which I hold an active license [place]    Encounter provider Radha Felton, DO    Provider located at [address] [place] [id number] [phone]    Recent Visits  No visits were found meeting these conditions  Showing recent visits within past 7 days and meeting all other requirements  Today's Visits  Date Type Provider Dept   07/27/21 Telemedicine Radha Felton, [facility]   Showing today's visits and meeting all other requirements  Future Appointments  No visits were found meeting these conditions    Showing future appointments within next 150 days and meeting all other requirements     This virtual check-in was done via [employer] and patient was informed that this is a secure, HIPAA-compliant platform  She agrees to proceed  Patient agrees to participate in a virtual check in via telephone or video visit instead of presenting to the office to address urgent/immediate medical needs  Patient is aware this is a billable service  After connecting through [facility], the patient was identified by name and date of birth  Iain Leslie was informed that this was a telemedicine visit and that the exam was being conducted confidentially over secure lines  My office door was closed  No one else was in the room  Iain Leslie acknowledged consent and understanding of privacy and security of the telemedicine visit  I informed the patient that I have reviewed her record in Epic and presented the opportunity for her to ask any questions regarding the visit today  The patient agreed to participate  Subjective:   Iain Leslie is a 48 y o  female who is concerned about COVID-19  Patient's symptoms include sore throat, cough, shortness of breath, chest tightness and headache  Patient denies fever, chills, fatigue, malaise, congestion, rhinorrhea, anosmia, loss of taste, abdominal pain, nausea, vomiting, diarrhea and myalgias       Date of symptom onset: 7/26/2021    Exposure:   Contact with a person who is under investigation (PUI) for or who is positive for COVID-19 within the last 14 days?: No    Hospitalized recently for fever and/or lower respiratory symptoms?: No      Currently a healthcare worker that is involved in direct patient care?: Yes      Works in a special setting where the risk of COVID-19 transmission may be high? (this may include long-term care, correctional and senior living facilities; homeless shelters; assisted-living facilities and group homes ): No      Resident in a special setting where the risk of COVID-19 transmission may be high? 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 Patient son was tested last week for covid due to similar issues and was negative Patient took 4 advil yesterday Patient with headache starting in her neck Patient cough is dry She had a sudden onset of coughing spell while working with a client yesterday and had the episode of chest tightness and shortness of breath lasting several minutes that did resolve     Lab Results   Component Value Date    SARSCOV2 Negative 2021    Rheradhaa Sheldonler Negative 2020     Past Medical History:   Diagnosis Date    Anemia     Back pain     low back - muscular    Chlamydia         Dental disease     Fatigue     Fibroids     Headache     Hearing loss     left ear    Hyperlipidemia     no meds at present    Iron deficiency anemia     iron and B12 def, has had iron infusions    Menorrhagia with irregular cycle     Nasal congestion     Palpitation     Shortness of breath     on exertion secondary to anemia    Varicella     Vitamin B12 deficiency     Vitamin D deficiency     Vitamin D deficiency     Wears glasses      Past Surgical History:   Procedure Laterality Date     SECTION      CYSTOSCOPY N/A 3/3/2021    Procedure: CYSTOSCOPY;  Surgeon: Earnestine Hemphill MD;  [facility];  Service: Gynecology    KS LAP, SUPRACERVIAL HYSTERECTOMY W/ TUBE&OV, <250G N/A 3/3/2021    Procedure: DORIAN ANDERSON;  Surgeon: Earnestine Hemphill MD;  [facility];  Service: Gynecology    TONSILLECTOMY Bilateral 2019    Procedure: TONSILLECTOMY;  Surgeon: Ronnie Fleming MD;  [facility]: [facility] MAIN OR;  Service: ENT    TUBAL LIGATION       Current Outpatient Medications   Medication Sig Dispense Refill    acetaminophen (TYLENOL) 325 mg tablet Take 2 tablets (650 mg total) by mouth every 6 (six) hours as needed for mild pain, moderate pain, headaches or fever 30 tablet 0    Ascorbic Acid (VITAMIN C PO) Take by mouth daily       cholecalciferol (VITAMIN D3) 1,000 units tablet Take 1,000 Units by mouth daily      docusate sodium (COLACE) 100 mg capsule Take 1 capsule (100 mg total) by mouth 2 (two) times a day 30 capsule 0    ferrous sulfate 325 (65 Fe) mg tablet Take 325 mg by mouth daily with breakfast      psyllium (METAMUCIL SMOOTH TEXTURE) 28 % packet Take 1 packet by mouth 2 (two) times a day 30 packet 0    RA VITAMIN B-12 TR 1000 MCG TBCR daily   0     No current facility-administered medications for this visit  No Known Allergies    Review of Systems   Constitutional: Negative for chills, fatigue and fever  HENT: Positive for sore throat  Negative for congestion and rhinorrhea  Respiratory: Positive for cough, chest tightness and shortness of breath  Gastrointestinal: Negative for abdominal pain, diarrhea, nausea and vomiting  Musculoskeletal: Negative for myalgias  Neurological: Positive for headaches  Objective: There were no vitals filed for this visit  Physical Exam  Constitutional:       Appearance: She is obese  HENT:      Head: Normocephalic  Right Ear: External ear normal       Left Ear: External ear normal    Eyes:      Conjunctiva/sclera: Conjunctivae normal       Pupils: Pupils are equal, round, and reactive to light  Pulmonary:      Effort: Pulmonary effort is normal    Neurological:      General: No focal deficit present  Mental Status: She is alert and oriented to person, place, and time  Psychiatric:         Mood and Affect: Mood normal          Behavior: Behavior normal          Thought Content: Thought content normal          Judgment: Judgment normal          VIRTUAL VISIT DISCLAIMER    Kaitlin Forrest verbally agrees to participate in [employer]   Pt is aware that [employer] could be limited without vital signs or the ability to perform a full hands-on physical Bonnye Hamman understands she or the provider may request at any time to terminate the video visit and request the patient to seek care or treatment in person

## 2021-07-27 NOTE — ASSESSMENT & PLAN NOTE
No direct exposure Patient to treat symptoms with OTC meds Patient to self quarantine and self isolate until test results return

## 2021-07-27 NOTE — TELEPHONE ENCOUNTER
----- Message from Emily Thayer sent at 7/27/2021  8:43 AM EDT -----  Regarding: Request Quality  07/27/21 8:43 AM    Hello, our patient Kaitlin Forrest has had Hepatitis C completed/performed  Please assist in updating the patient chart by pulling the Care Everywhere (CE) document  The date of service is 05/01/2018       Thank you,  [facility]

## 2021-07-28 NOTE — TELEPHONE ENCOUNTER
Upon review of the In Basket request we were able to locate, review, and update the patient chart as requested for Hepatitis C   Any additional questions or concerns should be emailed to the Practice Liaisons via [email or web address] email, please do not reply via In Basket      Thank you  Mahamed Gee

## 2022-07-06 NOTE — PROGRESS NOTES
Continued Stay Note  [facility]     Patient Name: Cooper Covarrubias  MRN: [id number]  Today's Date: 7/6/2022    Admit Date: 7/3/2022     Discharge Plan     Row Name 07/06/22 1551       Plan    Plan discharge    Plan Comments Malena outpatient liaison spoke with Dtrs at bedside. [facility] and [facility] informed her  that they [facility] only be [facility] and [facility]. Dr Swan will [facility] and see if she is able to direct admit. Family aware. Will follow up tomorrow.               Discharge Codes    No documentation.               Expected Discharge Date and Time     Expected Discharge Date Expected Discharge Time    Jul 9, 2022             Gely Thapa RN     I put in referral to hematology    We may need to call [facility] line to set it up

## 2022-10-25 NOTE — TELEPHONE ENCOUNTER
Called patient as she didn't show for her f/u apt today Mon 3/09 at 4:00 w/Dr Leonardo Alexandra  Apt was made on 2/27 same day patient went for labs  Patient did not make it to January apt or October apt  Per Dr Leonardo Alexandra since this is the third missed apt patient cannot be rescheduled  Patient must go else where for care  Discharged

## 2023-02-23 ENCOUNTER — OFFICE VISIT (OUTPATIENT)
Dept: GYNECOLOGY | Facility: CLINIC | Age: 53
End: 2023-02-23

## 2023-02-23 VITALS
DIASTOLIC BLOOD PRESSURE: 84 MMHG | WEIGHT: 164.2 LBS | SYSTOLIC BLOOD PRESSURE: 118 MMHG | HEIGHT: 61 IN | BODY MASS INDEX: 31 KG/M2

## 2023-02-23 DIAGNOSIS — Z20.2 POSSIBLE EXPOSURE TO STD: ICD-10-CM

## 2023-02-23 DIAGNOSIS — N76.0 BACTERIAL VAGINOSIS: ICD-10-CM

## 2023-02-23 DIAGNOSIS — Z11.3 SCREEN FOR STD (SEXUALLY TRANSMITTED DISEASE): ICD-10-CM

## 2023-02-23 DIAGNOSIS — B96.89 BACTERIAL VAGINOSIS: ICD-10-CM

## 2023-02-23 DIAGNOSIS — N39.0 URINARY TRACT INFECTION WITHOUT HEMATURIA, SITE UNSPECIFIED: Primary | ICD-10-CM

## 2023-02-23 LAB
BV WHIFF TEST VAG QL: POSITIVE
CLUE CELLS SPEC QL WET PREP: POSITIVE
PH SMN: 5 [PH]
SL AMB POCT WET MOUNT: YES
T VAGINALIS VAG QL WET PREP: NEGATIVE
YEAST VAG QL WET PREP: NEGATIVE

## 2023-02-23 RX ORDER — METRONIDAZOLE 7.5 MG/G
1 GEL VAGINAL
Qty: 70 G | Refills: 0 | Status: SHIPPED | OUTPATIENT
Start: 2023-02-23

## 2023-02-23 RX ORDER — SULFAMETHOXAZOLE AND TRIMETHOPRIM 800; 160 MG/1; MG/1
1 TABLET ORAL EVERY 12 HOURS SCHEDULED
Qty: 6 TABLET | Refills: 0 | Status: SHIPPED | OUTPATIENT
Start: 2023-02-23 | End: 2023-02-26

## 2023-02-23 NOTE — PROGRESS NOTES
Assessment/Plan   Diagnoses and all orders for this visit:    Urinary tract infection without hematuria, site unspecified  -     sulfamethoxazole-trimethoprim (BACTRIM DS) 800-160 mg per tablet; Take 1 tablet by mouth every 12 (twelve) hours for 3 days    Bacterial vaginosis  -     POCT wet mount  -     metroNIDAZOLE (METROGEL) 0 75 % vaginal gel; Insert 1 application into the vagina daily at bedtime For 5 nights    Possible exposure to STD    1  cystitis-patient has a 10-day history of significant frequency with urgency ability to empty the bladder  She denies catracho dysuria or hematuria  She does note some back pain toward the left back  Exam was notable for some mild tenderness over the bladder without CVA tenderness appreciated  When she had symptoms like this in the past, she had UTI which was treated with antibiotics  Given this, we will treat empirically with Bactrim DS twice daily for 3 days time  Urinalysis and urine culture was sent and we will treat accordingly  2  bacterial vaginosis- vaginal discharge noted with some odor  Exam wet prep consistent with BV  She has had this in the past   Treatment options were reviewed  Electronic prescription for MetroGel vaginal to take nightly for 5 nights was sent to local pharmacy  3  status post LSH-done 3/3/2021 for fibroids  Patient still has cervix and ovaries and this was shown to her and on an atlas  4  STD testing-patient new partner for 2 months time  Initially used condoms, not so at this time  She wished to have GC/chlamydia testing done, done today we will inform her of the findings  Did offer blood testing, she will defer at this time  5  other-overdue for yearly exam   Follow-up near future for yearly exam or as needed  Subjective   Patient ID: Dominguez Garcia is a 46 y o  female  Vitals:    02/23/23 1531   BP: 118/84     Patient was seen today for follow-up please see assessment plan for details        The following portions of the patient's history were reviewed and updated as appropriate: allergies, current medications, past family history, past medical history, past social history, past surgical history and problem list   Past Medical History:   Diagnosis Date   • Anemia    • Back pain     low back - muscular   • Chlamydia        • Dental disease    • Fatigue    • Fibroids    • Headache    • Hearing loss     left ear   • Hyperlipidemia     no meds at present   • Iron deficiency anemia     iron and B12 def, has had iron infusions   • Menorrhagia with irregular cycle    • Nasal congestion    • Palpitation    • Shortness of breath     on exertion secondary to anemia   • Varicella    • Vitamin B12 deficiency    • Vitamin D deficiency    • Vitamin D deficiency    • Wears glasses      Past Surgical History:   Procedure Laterality Date   •  SECTION     • CYSTOSCOPY N/A 3/3/2021    Procedure: CYSTOSCOPY;  Surgeon: Fidelia Burden MD;  [facility];  Service: Gynecology   • HI LAPS SUPRACRV HYSTERECT 250 GM/< RMVL TUBE/OVAR N/A 3/3/2021    Procedure: L S H , DORIAN;  Surgeon: Fidelia Burden MD;  [facility];  Service: Gynecology   • TONSILLECTOMY Bilateral 2019    Procedure: TONSILLECTOMY;  Surgeon: Susana Chilel MD;  Location: [address] [place] [address] MAIN OR;  Service: ENT   • TUBAL LIGATION       OB History    Para Term  AB Living   3 3 3     3   SAB IAB Ectopic Multiple Live Births                  # Outcome Date GA Lbr Oswaldo/2nd Weight Sex Delivery Anes PTL Lv   3 Term            2 Term            1 Term                Current Outpatient Medications:   •  acetaminophen (TYLENOL) 325 mg tablet, Take 2 tablets (650 mg total) by mouth every 6 (six) hours as needed for mild pain, moderate pain, headaches or fever, Disp: 30 tablet, Rfl: 0  •  Ascorbic Acid (VITAMIN C PO), Take by mouth daily , Disp: , Rfl:   •  cholecalciferol (VITAMIN D3) 1,000 units tablet, Take 1,000 Units by mouth daily, Disp: , Rfl:   •  docusate sodium (COLACE) 100 mg capsule, Take 1 capsule (100 mg total) by mouth 2 (two) times a day, Disp: 30 capsule, Rfl: 0  •  ferrous sulfate 325 (65 Fe) mg tablet, Take 325 mg by mouth daily with breakfast, Disp: , Rfl:   •  metroNIDAZOLE (METROGEL) 0 75 % vaginal gel, Insert 1 application into the vagina daily at bedtime For 5 nights, Disp: 70 g, Rfl: 0  •  psyllium (METAMUCIL SMOOTH TEXTURE) 28 % packet, Take 1 packet by mouth 2 (two) times a day, Disp: 30 packet, Rfl: 0  •  RA VITAMIN B-12 TR 1000 MCG TBCR, daily , Disp: , Rfl: 0  •  sulfamethoxazole-trimethoprim (BACTRIM DS) 800-160 mg per tablet, Take 1 tablet by mouth every 12 (twelve) hours for 3 days, Disp: 6 tablet, Rfl: 0  No Known Allergies  Social History     Socioeconomic History   • Marital status: Single     Spouse name: None   • Number of children: None   • Years of education: None   • Highest education level: None   Occupational History   • None   Tobacco Use   • Smoking status: Former     Types: Cigarettes     Quit date: 10/3/2015     Years since quittin 3   • Smokeless tobacco: Never   Vaping Use   • Vaping Use: Never used   Substance and Sexual Activity   • Alcohol use: Yes     Comment: wine   • Drug use: Not Currently     Types: Marijuana     Comment: quit in    • Sexual activity: Not Currently     Partners: Male     Birth control/protection: Female Sterilization   Other Topics Concern   • None   Social History Narrative    Caffeine use    Uses safety equipment seatbelt     Social Determinants of Health     Financial Resource Strain: Not on file   Food Insecurity: Not on file   Transportation Needs: Not on file   Physical Activity: Not on file   Stress: Not on file   Social Connections: Not on file   Intimate Partner Violence: Not on file   Housing Stability: Not on file     Family History   Problem Relation Age of Onset   • Diabetes Mother    • Cancer Mother    • Stroke Mother    • Hypertension Mother    • Lung cancer Mother    • Coronary artery disease Mother    • Hyperlipidemia Family    • Alcohol abuse Father    • Cirrhosis Father    • Diabetes Sister    • Stroke Sister    • Hyperlipidemia Brother    • Hyperlipidemia Sister    • Diabetes Sister        Review of Systems   Constitutional: Negative for chills, diaphoresis, fatigue and fever  Respiratory: Negative for apnea, cough, chest tightness, shortness of breath and wheezing  Cardiovascular: Negative for chest pain, palpitations and leg swelling  Gastrointestinal: Negative for abdominal distention, abdominal pain, anal bleeding, constipation, diarrhea, nausea, rectal pain and vomiting  Genitourinary: Positive for vaginal discharge  Negative for difficulty urinating, dyspareunia, dysuria, frequency, hematuria, menstrual problem, pelvic pain, urgency, vaginal bleeding and vaginal pain  Musculoskeletal: Negative for arthralgias, back pain and myalgias  Skin: Negative for color change and rash  Neurological: Negative for dizziness, syncope, light-headedness, numbness and headaches  Hematological: Negative for adenopathy  Does not bruise/bleed easily  Psychiatric/Behavioral: Negative for dysphoric mood and sleep disturbance  The patient is not nervous/anxious  Objective   Physical Exam  OBGyn Exam     Objective      /84 (BP Location: Right arm, Patient Position: Sitting)   Ht 5' 1" (1 549 m)   Wt 74 5 kg (164 lb 3 2 oz)   BMI 31 03 kg/m²     General:   alert and oriented, in no acute distress   Neck:    Breast:    Heart:    Lungs:    Abdomen: soft, non-tender, without masses or organomegaly   Vulva: normal   Vagina:  Small to moderate amount of white discharge, without erythema or lesions  Mild discomfort over the bladder   Cervix:  Small to moderate amount of white discharge, without lesions or cervicitis  No CMT     Uterus: surgically absent   Adnexa: no mass, fullness, tenderness   Rectum: deferred    Psych:  Normal mood and affect   Skin:  Without obvious lesions   Eyes: symmetric, with normal movements and reactivity   Musculoskeletal:  Normal muscle tone and movements appreciated

## 2023-02-24 LAB
AMORPH URATE CRY URNS QL MICRO: ABNORMAL
BACTERIA UR QL AUTO: ABNORMAL /HPF
BILIRUB UR QL STRIP: NEGATIVE
C TRACH DNA SPEC QL NAA+PROBE: NEGATIVE
CLARITY UR: CLEAR
COLOR UR: ABNORMAL
GLUCOSE UR STRIP-MCNC: NEGATIVE MG/DL
HGB UR QL STRIP.AUTO: NEGATIVE
KETONES UR STRIP-MCNC: NEGATIVE MG/DL
LEUKOCYTE ESTERASE UR QL STRIP: ABNORMAL
N GONORRHOEA DNA SPEC QL NAA+PROBE: NEGATIVE
NITRITE UR QL STRIP: NEGATIVE
NON-SQ EPI CELLS URNS QL MICRO: ABNORMAL /HPF
PH UR STRIP.AUTO: 6.5 [PH]
PROT UR STRIP-MCNC: ABNORMAL MG/DL
RBC #/AREA URNS AUTO: ABNORMAL /HPF
SP GR UR STRIP.AUTO: 1.02 (ref 1–1.03)
UROBILINOGEN UR STRIP-ACNC: <2 MG/DL
WBC #/AREA URNS AUTO: ABNORMAL /HPF

## 2023-02-26 LAB
BACTERIA UR CULT: ABNORMAL
BACTERIA UR CULT: ABNORMAL

## 2023-02-27 DIAGNOSIS — N39.0 URINARY TRACT INFECTION WITHOUT HEMATURIA, SITE UNSPECIFIED: Primary | ICD-10-CM

## 2023-02-28 RX ORDER — CEPHALEXIN 500 MG/1
500 CAPSULE ORAL EVERY 6 HOURS SCHEDULED
Qty: 28 CAPSULE | Refills: 0 | Status: SHIPPED | OUTPATIENT
Start: 2023-02-28 | End: 2023-03-07

## 2023-03-23 ENCOUNTER — ANNUAL EXAM (OUTPATIENT)
Dept: GYNECOLOGY | Facility: CLINIC | Age: 53
End: 2023-03-23

## 2023-03-23 VITALS
SYSTOLIC BLOOD PRESSURE: 128 MMHG | DIASTOLIC BLOOD PRESSURE: 78 MMHG | BODY MASS INDEX: 30.58 KG/M2 | HEIGHT: 61 IN | HEART RATE: 87 BPM | WEIGHT: 162 LBS

## 2023-03-23 DIAGNOSIS — Z01.419 WOMEN'S ANNUAL ROUTINE GYNECOLOGICAL EXAMINATION: Primary | ICD-10-CM

## 2023-03-23 DIAGNOSIS — Z01.419 ENCOUNTER FOR GYNECOLOGICAL EXAMINATION WITH PAPANICOLAOU SMEAR OF CERVIX: ICD-10-CM

## 2023-03-23 DIAGNOSIS — Z90.711 STATUS POST LAPAROSCOPIC SUPRACERVICAL HYSTERECTOMY: ICD-10-CM

## 2023-03-23 DIAGNOSIS — N95.2 VAGINAL ATROPHY: ICD-10-CM

## 2023-03-23 DIAGNOSIS — Z12.31 ENCOUNTER FOR SCREENING MAMMOGRAM FOR MALIGNANT NEOPLASM OF BREAST: ICD-10-CM

## 2023-03-23 DIAGNOSIS — Z12.11 COLON CANCER SCREENING: ICD-10-CM

## 2023-03-23 NOTE — PROGRESS NOTES
Assessment/Plan   Diagnoses and all orders for this visit:    Women's annual routine gynecological examination    Encounter for screening mammogram for malignant neoplasm of breast  -     Mammo screening bilateral w 3d & cad; Future    Status post laparoscopic supracervical hysterectomy    Colon cancer screening  -     Ambulatory referral to Gastroenterology; Future    Encounter for gynecological examination with Papanicolaou smear of cervix  -     Liquid-based pap, screening    Vaginal atrophy    1  yearly exam-Pap smear done with HPV testing, self breast awareness reviewed, calcium/vitamin D recommendations discussed, mammogram request given, colonoscopy referral placed, encouraged to get this done in the near future  2  vaginal atrophy-mild changes noted on exam   Patient does note some dryness  Vaginal lubrication/moisturizer she was given, to use accordingly  She will call or return if worsening symptoms  3   Previous cystitis- was initially treated with Bactrim, but the E  coli UTIs x2 were both resistant  She was treated with Keflex 500 mg 4 times a day for 7 days with resolution of symptoms  She will call or return with any issues  4  previous bacterial vaginosis-resolved  5  status post laparoscopic supracervical hysterectomy-done 3/3/2021 for fibroids, 224 g uterus  6  STD-new partner for about 3 months time  GC/chlamydia done 2/23/2023 which was negative  She declines blood testing at this time  7   Lennox Flicker is a patient of mine, 12 years younger than her  She has not been seen for some time  Recommend gynecology evaluation  Follow-up 1 year for yearly exam or as needed  Subjective   Patient ID: Queta Gaffney is a 46 y o  female  Vitals:    03/23/23 1556   BP: 128/78   Pulse: 87     Patient was seen today for yearly exam   Please see assessment plan for details        The following portions of the patient's history were reviewed and updated as appropriate: allergies, current medications, past family history, past medical history, past social history, past surgical history and problem list   Past Medical History:   Diagnosis Date   • Anemia    • Back pain     low back - muscular   • Chlamydia     2005   • Dental disease    • Fatigue    • Fibroids    • Headache    • Hearing loss     left ear   • Hyperlipidemia     no meds at present   • Iron deficiency anemia     iron and B12 def, has had iron infusions   • Menorrhagia with irregular cycle    • Nasal congestion    • Palpitation    • Shortness of breath     on exertion secondary to anemia   • Varicella    • Vitamin B12 deficiency    • Vitamin D deficiency    • Vitamin D deficiency    • Wears glasses      Past Surgical History:   Procedure Laterality Date   •  SECTION     • CYSTOSCOPY N/A 3/3/2021    Procedure: CYSTOSCOPY;  Surgeon: Jarod Mitchell MD;  Location: [facility];  Service: Gynecology   • CO LAPS SUPRACRV HYSTERECT 250 GM/< RMVL TUBE/OVAR N/A 3/3/2021    Procedure: L S H , DORIAN;  Surgeon: Jarod Mitchell MD;  Location: [facility];  Service: Gynecology   • TONSILLECTOMY Bilateral 2019    Procedure: TONSILLECTOMY;  Surgeon: Hermelinda Mckay MD;  Location: [facility];  Service: ENT   • TUBAL LIGATION       OB History    Para Term  AB Living   3 3 3     3   SAB IAB Ectopic Multiple Live Births                  # Outcome Date GA Lbr Oswaldo/2nd Weight Sex Delivery Anes PTL Lv   3 Term            2 Term            1 Term                Current Outpatient Medications:   •  acetaminophen (TYLENOL) 325 mg tablet, Take 2 tablets (650 mg total) by mouth every 6 (six) hours as needed for mild pain, moderate pain, headaches or fever, Disp: 30 tablet, Rfl: 0  •  Ascorbic Acid (VITAMIN C PO), Take by mouth daily , Disp: , Rfl:   •  cholecalciferol (VITAMIN D3) 1,000 units tablet, Take 1,000 Units by mouth daily, Disp: , Rfl:   •  docusate sodium (COLACE) 100 mg capsule, Take 1 capsule (100 mg total) by mouth 2 (two) times a day, Disp: 30 capsule, Rfl: 0  •  ferrous sulfate 325 (65 Fe) mg tablet, Take 325 mg by mouth daily with breakfast, Disp: , Rfl:   •  metroNIDAZOLE (METROGEL) 0 75 % vaginal gel, Insert 1 application into the vagina daily at bedtime For 5 nights, Disp: 70 g, Rfl: 0  •  psyllium (METAMUCIL SMOOTH TEXTURE) 28 % packet, Take 1 packet by mouth 2 (two) times a day, Disp: 30 packet, Rfl: 0  •  RA VITAMIN B-12 TR 1000 MCG TBCR, daily , Disp: , Rfl: 0  No Known Allergies  Social History     Socioeconomic History   • Marital status: Single     Spouse name: None   • Number of children: None   • Years of education: None   • Highest education level: None   Occupational History   • None   Tobacco Use   • Smoking status: Former     Types: Cigarettes     Quit date: 10/3/2015     Years since quittin 4   • Smokeless tobacco: Never   Vaping Use   • Vaping Use: Never used   Substance and Sexual Activity   • Alcohol use: Yes     Comment: wine   • Drug use: Not Currently     Types: Marijuana     Comment: quit in    • Sexual activity: Not Currently     Partners: Male     Birth control/protection: Female Sterilization   Other Topics Concern   • None   Social History Narrative    Caffeine use    Uses safety equipment seatbelt     Social Determinants of Health     Financial Resource Strain: Not on file   Food Insecurity: Not on file   Transportation Needs: Not on file   Physical Activity: Not on file   Stress: Not on file   Social Connections: Not on file   Intimate Partner Violence: Not on file   Housing Stability: Not on file     Family History   Problem Relation Age of Onset   • Diabetes Mother    • Cancer Mother    • Stroke Mother    • Hypertension Mother    • Lung cancer Mother    • Coronary artery disease Mother    • Hyperlipidemia Family    • Alcohol abuse Father    • Cirrhosis Father    • Diabetes Sister    • Stroke Sister    • Hyperlipidemia Brother    • Hyperlipidemia Sister    • Diabetes Sister        Review of Systems Constitutional: Negative for chills, diaphoresis, fatigue and fever  Respiratory: Negative for apnea, cough, chest tightness, shortness of breath and wheezing  Cardiovascular: Negative for chest pain, palpitations and leg swelling  Gastrointestinal: Negative for abdominal distention, abdominal pain, anal bleeding, constipation, diarrhea, nausea, rectal pain and vomiting  Genitourinary: Negative for difficulty urinating, dyspareunia, dysuria, frequency, hematuria, menstrual problem, pelvic pain, urgency, vaginal bleeding, vaginal discharge and vaginal pain  Musculoskeletal: Negative for arthralgias, back pain and myalgias  Skin: Negative for color change and rash  Neurological: Negative for dizziness, syncope, light-headedness, numbness and headaches  Hematological: Negative for adenopathy  Does not bruise/bleed easily  Psychiatric/Behavioral: Negative for dysphoric mood and sleep disturbance  The patient is not nervous/anxious  Objective   Physical Exam  OBGyn Exam     Objective      /78   Pulse 87   Ht 5' 1" (1 549 m)   Wt 73 5 kg (162 lb)   BMI 30 61 kg/m²     General:   alert and oriented, in no acute distress   Neck: normal to inspection and palpation   Breast: normal appearance, no masses or tenderness   Heart:    Lungs:    Abdomen: soft, non-tender, without masses or organomegaly   Vulva: normal   Vagina:  Mildly atrophic, without erythema or lesions or discharge  Cervix:  Mildly atrophic, without lesions or discharge or cervicitis  No CMT     Uterus: surgically absent   Adnexa: no mass, fullness, tenderness   Rectum: negative    Psych:  Normal mood and affect   Skin:  Without obvious lesions   Eyes: symmetric, with normal movements and reactivity   Musculoskeletal:  Normal muscle tone and movements appreciated

## 2023-04-01 LAB
LAB AP GYN PRIMARY INTERPRETATION: NORMAL
Lab: NORMAL

## 2023-08-30 ENCOUNTER — TELEPHONE (OUTPATIENT)
Age: 53
End: 2023-08-30

## 2023-08-30 NOTE — TELEPHONE ENCOUNTER
Pt said she was returning phone call but didn't know from whom. No telephone encounter in chart. She asked about any upcoming appointments.  I told her GYN was scheduled for 03/26/2024

## 2024-05-03 ENCOUNTER — ANNUAL EXAM (OUTPATIENT)
Dept: GYNECOLOGY | Facility: CLINIC | Age: 54
End: 2024-05-03
Payer: COMMERCIAL

## 2024-05-03 VITALS
BODY MASS INDEX: 32.13 KG/M2 | HEIGHT: 62 IN | WEIGHT: 174.6 LBS | SYSTOLIC BLOOD PRESSURE: 118 MMHG | DIASTOLIC BLOOD PRESSURE: 72 MMHG

## 2024-05-03 DIAGNOSIS — N95.2 VAGINAL ATROPHY: ICD-10-CM

## 2024-05-03 DIAGNOSIS — Z12.11 COLON CANCER SCREENING: ICD-10-CM

## 2024-05-03 DIAGNOSIS — Z01.419 WOMEN'S ANNUAL ROUTINE GYNECOLOGICAL EXAMINATION: Primary | ICD-10-CM

## 2024-05-03 DIAGNOSIS — Z90.711 STATUS POST LAPAROSCOPIC SUPRACERVICAL HYSTERECTOMY: ICD-10-CM

## 2024-05-03 PROBLEM — N92.0 MENORRHAGIA WITH REGULAR CYCLE: Status: RESOLVED | Noted: 2020-04-08 | Resolved: 2024-05-03

## 2024-05-03 PROCEDURE — S0612 ANNUAL GYNECOLOGICAL EXAMINA: HCPCS | Performed by: OBSTETRICS & GYNECOLOGY

## 2024-05-03 NOTE — PROGRESS NOTES
Assessment/Plan   Diagnoses and all orders for this visit:    Women's annual routine gynecological examination    Status post laparoscopic supracervical hysterectomy    Vaginal atrophy    Colon cancer screening  -     Ambulatory referral to Gastroenterology; Future    1. yearly exam-Pap smear deferred, self breast awareness reviewed, calcium/vitamin D recommendations discussed, mammogram request given, colonoscopy referral again placed, strongly encouraged to get colonoscopy done.  She did not utilize referral from last year.  2. vaginal atrophy-very minimal changes noted on exam today.  She denies any need for lubrication.  Vaginal lubrication/moisturizer sheet given, to use accordingly.  3.  Previous cystitis-was treated at February 2023 visit without recurrence of symptoms.  To call or return with any issues.  4. previous bacterial vaginosis-none noted on exam today  5. status post laparoscopic supracervical hysterectomy-done 3/3/2021 by Dr. Gonsalves for fibroids with 224 g uterus noted.  Good healing is noted.  6. STD-had GC/chlamydia 2/23/2023 which was negative.  Declines need for any testing at this time.  7.  Intermittent breast tenderness-Notes this for couple days at monthly intervals.  It is bilateral without any asymmetry.  Breast exam is negative.  It is possible she could still be having cyclical hormonal changes.  Last mammogram was 2019, strongly encouraged to get this done in the near future.  8. other-Sister is a patient of mine, encouraged to follow-up as needed.  She works at [employer].  Follow-up 1 year for yearly exam or as needed.    Subjective   Patient ID: Yadira Garcia is a 53 y.o. female.    Vitals:    05/03/24 1433   BP: 118/72     Patient was seen today for yearly exam.  Please see assessment plan for details.        The following portions of the patient's history were reviewed and updated as appropriate: allergies, current medications, past family history, past  medical history, past social history, past surgical history, and problem list.  Past Medical History:   Diagnosis Date    Anemia     Back pain     low back - muscular    Chlamydia         Dental disease     Fatigue     Fibroids     Headache     Hearing loss     left ear    Hyperlipidemia     no meds at present    Iron deficiency anemia     iron and B12 def, has had iron infusions    Menorrhagia with irregular cycle     Nasal congestion     Palpitation     Shortness of breath     on exertion secondary to anemia    Varicella     Vitamin B12 deficiency     Vitamin D deficiency     Vitamin D deficiency     Wears glasses      Past Surgical History:   Procedure Laterality Date     SECTION      CYSTOSCOPY N/A 3/3/2021    Procedure: CYSTOSCOPY;  Surgeon: Maxi Gonsalves MD;  Location: [facility];  Service: Gynecology    TN LAPS SUPRACRV HYSTERECT 250 GM/< RMVL TUBE/OVAR N/A 3/3/2021    Procedure: L.S.H., DORIAN;  Surgeon: Maxi Gonsalves MD;  [facility];  Service: Gynecology    TONSILLECTOMY Bilateral 2019    Procedure: TONSILLECTOMY;  Surgeon: Loyd Saavedra MD;  Location:  MAIN OR;  Service: ENT    TUBAL LIGATION       OB History    Para Term  AB Living   3 3 3     3   SAB IAB Ectopic Multiple Live Births                  # Outcome Date GA Lbr Oswaldo/2nd Weight Sex Delivery Anes PTL Lv   3 Term            2 Term            1 Term                Current Outpatient Medications:     acetaminophen (TYLENOL) 325 mg tablet, Take 2 tablets (650 mg total) by mouth every 6 (six) hours as needed for mild pain, moderate pain, headaches or fever, Disp: 30 tablet, Rfl: 0    Ascorbic Acid (VITAMIN C PO), Take by mouth daily , Disp: , Rfl:     cholecalciferol (VITAMIN D3) 1,000 units tablet, Take 1,000 Units by mouth daily, Disp: , Rfl:     RA VITAMIN B-12 TR 1000 MCG TBCR, daily , Disp: , Rfl: 0    docusate sodium (COLACE) 100 mg capsule, Take 1 capsule (100 mg total) by mouth 2 (two) times a day (Patient  not taking: Reported on 5/3/2024), Disp: 30 capsule, Rfl: 0    ferrous sulfate 325 (65 Fe) mg tablet, Take 325 mg by mouth daily with breakfast (Patient not taking: Reported on 5/3/2024), Disp: , Rfl:     metroNIDAZOLE (METROGEL) 0.75 % vaginal gel, Insert 1 application into the vagina daily at bedtime For 5 nights (Patient not taking: Reported on 5/3/2024), Disp: 70 g, Rfl: 0    psyllium (METAMUCIL SMOOTH TEXTURE) 28 % packet, Take 1 packet by mouth 2 (two) times a day (Patient not taking: Reported on 5/3/2024), Disp: 30 packet, Rfl: 0  No Known Allergies  Social History     Socioeconomic History    Marital status: Single     Spouse name: None    Number of children: None    Years of education: None    Highest education level: None   Occupational History    None   Tobacco Use    Smoking status: Former     Current packs/day: 0.00     Types: Cigarettes     Quit date: 10/3/2015     Years since quittin.5    Smokeless tobacco: Never   Vaping Use    Vaping status: Never Used   Substance and Sexual Activity    Alcohol use: Yes     Comment: wine    Drug use: Not Currently     Types: Marijuana     Comment: quit in     Sexual activity: Not Currently     Partners: Male     Birth control/protection: Female Sterilization   Other Topics Concern    None   Social History Narrative    Caffeine use    Uses safety equipment seatbelt     Social Determinants of Health     Financial Resource Strain: Not on file   Food Insecurity: Not on file   Transportation Needs: Not on file   Physical Activity: Not on file   Stress: Not on file   Social Connections: Not on file   Intimate Partner Violence: Not on file   Housing Stability: Not on file     Family History   Problem Relation Age of Onset    Diabetes Mother     Cancer Mother     Stroke Mother     Hypertension Mother     Lung cancer Mother     Coronary artery disease Mother     Hyperlipidemia Family     Alcohol abuse Father     Cirrhosis Father     Diabetes Sister     Stroke Sister   "   Hyperlipidemia Brother     Hyperlipidemia Sister     Diabetes Sister        Review of Systems   Constitutional:  Negative for chills, diaphoresis, fatigue and fever.   Respiratory:  Negative for apnea, cough, chest tightness, shortness of breath and wheezing.    Cardiovascular:  Negative for chest pain, palpitations and leg swelling.   Gastrointestinal:  Negative for abdominal distention, abdominal pain, anal bleeding, constipation, diarrhea, nausea, rectal pain and vomiting.   Genitourinary:  Negative for difficulty urinating, dyspareunia, dysuria, frequency, hematuria, menstrual problem, pelvic pain, urgency, vaginal bleeding, vaginal discharge and vaginal pain.   Musculoskeletal:  Negative for arthralgias, back pain and myalgias.   Skin:  Negative for color change and rash.   Neurological:  Negative for dizziness, syncope, light-headedness, numbness and headaches.   Hematological:  Negative for adenopathy. Does not bruise/bleed easily.   Psychiatric/Behavioral:  Negative for dysphoric mood and sleep disturbance. The patient is not nervous/anxious.        Objective   Physical Exam  OBGyn Exam     Objective      /72 (BP Location: Right arm, Patient Position: Sitting)   Ht 5' 1.5\" (1.562 m)   Wt 79.2 kg (174 lb 9.6 oz)   BMI 32.46 kg/m²     General:   alert and oriented, in no acute distress   Neck: normal to inspection and palpation   Breast: normal appearance, no masses or tenderness   Heart:    Lungs:    Abdomen: soft, non-tender, without masses or organomegaly   Vulva: normal   Vagina: Without erythema or lesions or discharge.  Normal   Cervix: Without lesions or discharge or cervicitis.  No Cervical motion tenderness   Uterus: surgically absent   Adnexa: no mass, fullness, tenderness   Rectum: negative    Psych:  Normal mood and affect   Skin:  Without obvious lesions   Eyes: symmetric, with normal movements and reactivity   Musculoskeletal:  Normal muscle tone and movements appreciated       "

## 2024-05-22 ENCOUNTER — PREP FOR PROCEDURE (OUTPATIENT)
Age: 54
End: 2024-05-22

## 2024-05-22 ENCOUNTER — TELEPHONE (OUTPATIENT)
Age: 54
End: 2024-05-22

## 2024-05-22 DIAGNOSIS — Z12.11 SCREENING FOR COLON CANCER: Primary | ICD-10-CM

## 2024-05-22 NOTE — TELEPHONE ENCOUNTER
Scheduled date of colonoscopy (as of today):6/28/2024  Physician performing colonoscopy:   Location of colonoscopy: [facility]  Bowel prep reviewed with patient: Valorie Haas  Instructions reviewed with patient by: Valorie Haas  Clearances: N/A    Miralax Dulcolax prep sent via [employer]

## 2024-05-22 NOTE — TELEPHONE ENCOUNTER
05/22/24  Screened by: Valorie Haas    Referring Provider     Pre- Screening:     There is no height or weight on file to calculate BMI.  Has patient been referred for a routine screening Colonoscopy? yes  Is the patient between 45-75 years old? yes      Previous Colonoscopy no   If yes:    Date: N/A    Facility: N/A    Reason: N/A        Does the patient want to see a Gastroenterologist prior to their procedure OR are they having any GI symptoms? no    Has the patient been hospitalized or had abdominal surgery in the past 6 months? no    Does the patient use supplemental oxygen? no    Does the patient take Coumadin, Lovenox, Plavix, Elliquis, Xarelto, or other blood thinning medication? no    Has the patient had a stroke, cardiac event, or stent placed in the past year? no        If patient is between 45yrs - 49yrs, please advise patient that we will have to confirm benefits & coverage with their insurance company for a routine screening colonoscopy.

## 2024-06-12 ENCOUNTER — ANESTHESIA EVENT (OUTPATIENT)
Dept: ANESTHESIOLOGY | Facility: HOSPITAL | Age: 54
End: 2024-06-12

## 2024-06-12 ENCOUNTER — ANESTHESIA (OUTPATIENT)
Dept: ANESTHESIOLOGY | Facility: HOSPITAL | Age: 54
End: 2024-06-12

## 2024-06-14 ENCOUNTER — TELEPHONE (OUTPATIENT)
Dept: GASTROENTEROLOGY | Facility: MEDICAL CENTER | Age: 54
End: 2024-06-14

## 2024-06-14 NOTE — TELEPHONE ENCOUNTER
Confirming Upcoming Procedure: Colonoscopy on June 28  Physician performing: Dr. Rodriguez  Location of procedure:  [facility]  Prep: Miralax

## 2024-06-20 ENCOUNTER — OFFICE VISIT (OUTPATIENT)
Dept: FAMILY MEDICINE CLINIC | Facility: CLINIC | Age: 54
End: 2024-06-20
Payer: COMMERCIAL

## 2024-06-20 VITALS
HEART RATE: 64 BPM | OXYGEN SATURATION: 99 % | DIASTOLIC BLOOD PRESSURE: 70 MMHG | TEMPERATURE: 97.1 F | WEIGHT: 176.8 LBS | HEIGHT: 62 IN | RESPIRATION RATE: 16 BRPM | SYSTOLIC BLOOD PRESSURE: 122 MMHG | BODY MASS INDEX: 32.54 KG/M2

## 2024-06-20 DIAGNOSIS — R79.89 LOW VITAMIN D LEVEL: ICD-10-CM

## 2024-06-20 DIAGNOSIS — E78.1 ESSENTIAL HYPERTRIGLYCERIDEMIA: ICD-10-CM

## 2024-06-20 DIAGNOSIS — M79.672 LEFT FOOT PAIN: ICD-10-CM

## 2024-06-20 DIAGNOSIS — D50.0 IRON DEFICIENCY ANEMIA DUE TO CHRONIC BLOOD LOSS: ICD-10-CM

## 2024-06-20 DIAGNOSIS — Z13.220 SCREENING FOR HYPERLIPIDEMIA: ICD-10-CM

## 2024-06-20 DIAGNOSIS — R53.83 FATIGUE, UNSPECIFIED TYPE: Primary | ICD-10-CM

## 2024-06-20 PROCEDURE — 99203 OFFICE O/P NEW LOW 30 MIN: CPT | Performed by: FAMILY MEDICINE

## 2024-06-20 NOTE — PATIENT INSTRUCTIONS
Here to establish care and will rec due to medical hx labs to update. Rec xray left foot r/o heel spur and rec consult with podiatry. Lose weight to get BMI lower than 25. Rec also to use sunscreen and monitor for ticks. See GYN and get mammogram and get colon screening with GI on June 28, 2024.

## 2024-06-20 NOTE — PROGRESS NOTES
Ambulatory Visit  Name: Yadira Garcia      : 1970      MRN: [id number]  Encounter Provider: Everton Vasquez DO  Encounter Date: 2024   Encounter department: [facility] PRIMARY CARE  Chief Complaint   Patient presents with    Establish Care     Pt is having pain in her feet and fatigue      Patient Instructions   Here to establish care and will rec due to medical hx labs to update. Rec xray left foot r/o heel spur and rec consult with podiatry. Lose weight to get BMI lower than 25. Rec also to use sunscreen and monitor for ticks. See GYN and get mammogram and get colon screening with GI on 2024.     Assessment & Plan   1. Fatigue, unspecified type  Comments:  check labs and lose weight as directed to get BMI lower than 25.  Orders:  -     Comprehensive metabolic panel; Future; Expected date: 2024  -     CBC and differential; Future; Expected date: 2024  -     TSH, 3rd generation; Future; Expected date: 2024  -     T4, free; Future; Expected date: 2024  -     Vitamin D 25 hydroxy; Future; Expected date: 2024  2. Left foot pain  Comments:  check xray left foot r/o heel spur  Orders:  -     Comprehensive metabolic panel; Future; Expected date: 2024  -     CBC and differential; Future; Expected date: 2024  -     Ambulatory Referral to Podiatry; Future  -     XR foot 3+ vw left; Future; Expected date: 2024  -     Vitamin D 25 hydroxy; Future; Expected date: 2024  3. Low vitamin D level  Comments:  check level  Orders:  -     Comprehensive metabolic panel; Future; Expected date: 2024  -     Vitamin D 25 hydroxy; Future; Expected date: 2024  4. Essential hypertriglyceridemia  Comments:  low fat diet encouraged, check labs  Orders:  -     Comprehensive metabolic panel; Future; Expected date: 2024  -     Lipid Panel with Direct LDL reflex; Future; Expected date: 2024  5. Iron deficiency anemia due to chronic  blood loss  Comments:  check labs  Orders:  -     CBC and differential; Future; Expected date: 06/20/2024  -     Iron Panel (Includes Ferritin, Iron Sat%, Iron, and TIBC); Future; Expected date: 06/20/2024  6. Screening for hyperlipidemia  Comments:  check labs  Orders:  -     Comprehensive metabolic panel; Future; Expected date: 06/20/2024  -     Lipid Panel with Direct LDL reflex; Future; Expected date: 06/20/2024  -     TSH, 3rd generation; Future; Expected date: 06/20/2024  -     T4, free; Future; Expected date: 06/20/2024    @[email or web address]  History of Present Illness     stablish Care (Pt is having pain in her feet and fatigue ) Patient is on feet all day. Patient is a  in [facility].         Review of Systems   Constitutional:  Positive for fatigue.   HENT: Negative.     Eyes: Negative.    Respiratory: Negative.     Cardiovascular: Negative.    Gastrointestinal: Negative.    Endocrine: Negative.    Genitourinary: Negative.    Musculoskeletal:         Left heel pain and possible plantar fasciitis   Skin: Negative.    Allergic/Immunologic: Negative.    Neurological: Negative.    Hematological: Negative.    Psychiatric/Behavioral: Negative.       Current Outpatient Medications on File Prior to Visit   Medication Sig Dispense Refill    acetaminophen (TYLENOL) 325 mg tablet Take 2 tablets (650 mg total) by mouth every 6 (six) hours as needed for mild pain, moderate pain, headaches or fever 30 tablet 0    Ascorbic Acid (VITAMIN C PO) Take by mouth daily  (Patient not taking: Reported on 6/20/2024)      cholecalciferol (VITAMIN D3) 1,000 units tablet Take 1,000 Units by mouth daily (Patient not taking: Reported on 6/20/2024)      docusate sodium (COLACE) 100 mg capsule Take 1 capsule (100 mg total) by mouth 2 (two) times a day (Patient not taking: Reported on 5/3/2024) 30 capsule 0    ferrous sulfate 325 (65 Fe) mg tablet Take 325 mg by mouth daily with breakfast (Patient not taking: Reported on  "5/3/2024)      metroNIDAZOLE (METROGEL) 0.75 % vaginal gel Insert 1 application into the vagina daily at bedtime For 5 nights (Patient not taking: Reported on 5/3/2024) 70 g 0    psyllium (METAMUCIL SMOOTH TEXTURE) 28 % packet Take 1 packet by mouth 2 (two) times a day (Patient not taking: Reported on 5/3/2024) 30 packet 0    RA VITAMIN B-12 TR 1000 MCG TBCR daily  (Patient not taking: Reported on 6/20/2024)  0     No current facility-administered medications on file prior to visit.      Objective     /70   Pulse 64   Temp (!) 97.1 °F (36.2 °C) (Temporal)   Resp 16   Ht 5' 1.5\" (1.562 m)   Wt 80.2 kg (176 lb 12.8 oz)   SpO2 99%   BMI 32.87 kg/m²     Physical Exam  Constitutional:       Appearance: She is well-developed. She is obese.   HENT:      Head: Normocephalic and atraumatic.      Right Ear: External ear normal.      Left Ear: External ear normal.      Nose: Nose normal.   Eyes:      Conjunctiva/sclera: Conjunctivae normal.      Pupils: Pupils are equal, round, and reactive to light.   Cardiovascular:      Rate and Rhythm: Normal rate and regular rhythm.      Pulses: Normal pulses.      Heart sounds: Normal heart sounds.   Pulmonary:      Effort: Pulmonary effort is normal.      Breath sounds: Normal breath sounds.   Musculoskeletal:         General: Normal range of motion.      Cervical back: Normal range of motion and neck supple.      Comments: Left plantar fasciitis   Skin:     General: Skin is warm and dry.      Capillary Refill: Capillary refill takes less than 2 seconds.   Neurological:      General: No focal deficit present.      Mental Status: She is alert and oriented to person, place, and time. Mental status is at baseline.      Deep Tendon Reflexes: Reflexes are normal and symmetric.   Psychiatric:         Mood and Affect: Mood normal.         Behavior: Behavior normal.         Thought Content: Thought content normal.         Judgment: Judgment normal.       Administrative Statements "   I have spent a total time of 30 minutes on 06/20/24 In caring for this patient including Diagnostic results, Prognosis, Risks and benefits of tx options, Instructions for management, Patient and family education, Importance of tx compliance, Risk factor reductions, Impressions, Counseling / Coordination of care, Documenting in the medical record, Reviewing / ordering tests, medicine, procedures  , and Obtaining or reviewing history  .

## 2024-06-21 ENCOUNTER — APPOINTMENT (OUTPATIENT)
Dept: LAB | Facility: HOSPITAL | Age: 54
End: 2024-06-21
Payer: COMMERCIAL

## 2024-06-21 ENCOUNTER — HOSPITAL ENCOUNTER (OUTPATIENT)
Dept: RADIOLOGY | Facility: HOSPITAL | Age: 54
Discharge: HOME | End: 2024-06-21
Payer: COMMERCIAL

## 2024-06-21 DIAGNOSIS — R79.89 LOW VITAMIN D LEVEL: ICD-10-CM

## 2024-06-21 DIAGNOSIS — E78.1 ESSENTIAL HYPERTRIGLYCERIDEMIA: ICD-10-CM

## 2024-06-21 DIAGNOSIS — M79.672 LEFT FOOT PAIN: ICD-10-CM

## 2024-06-21 DIAGNOSIS — D50.0 IRON DEFICIENCY ANEMIA DUE TO CHRONIC BLOOD LOSS: ICD-10-CM

## 2024-06-21 DIAGNOSIS — Z13.220 SCREENING FOR HYPERLIPIDEMIA: ICD-10-CM

## 2024-06-21 DIAGNOSIS — R53.83 FATIGUE, UNSPECIFIED TYPE: ICD-10-CM

## 2024-06-21 LAB
25(OH)D3 SERPL-MCNC: 20.8 NG/ML (ref 30–100)
ALBUMIN SERPL BCG-MCNC: 4.1 G/DL (ref 3.5–5)
ALP SERPL-CCNC: 48 U/L (ref 34–104)
ALT SERPL W P-5'-P-CCNC: 16 U/L (ref 7–52)
ANION GAP SERPL CALCULATED.3IONS-SCNC: 5 MMOL/L (ref 4–13)
AST SERPL W P-5'-P-CCNC: 16 U/L (ref 13–39)
BASOPHILS # BLD AUTO: 0.06 THOUSANDS/ÂΜL (ref 0–0.1)
BASOPHILS NFR BLD AUTO: 1 % (ref 0–1)
BILIRUB SERPL-MCNC: 0.67 MG/DL (ref 0.2–1)
BUN SERPL-MCNC: 18 MG/DL (ref 5–25)
CALCIUM SERPL-MCNC: 9.5 MG/DL (ref 8.4–10.2)
CHLORIDE SERPL-SCNC: 105 MMOL/L (ref 96–108)
CHOLEST SERPL-MCNC: 230 MG/DL
CO2 SERPL-SCNC: 29 MMOL/L (ref 21–32)
CREAT SERPL-MCNC: 0.74 MG/DL (ref 0.6–1.3)
EOSINOPHIL # BLD AUTO: 0.23 THOUSAND/ÂΜL (ref 0–0.61)
EOSINOPHIL NFR BLD AUTO: 5 % (ref 0–6)
ERYTHROCYTE [DISTWIDTH] IN BLOOD BY AUTOMATED COUNT: 12.5 % (ref 11.6–15.1)
FERRITIN SERPL-MCNC: 45 NG/ML (ref 11–307)
GFR SERPL CREATININE-BSD FRML MDRD: 92 ML/MIN/1.73SQ M
GLUCOSE P FAST SERPL-MCNC: 94 MG/DL (ref 65–99)
HCT VFR BLD AUTO: 40.3 % (ref 34.8–46.1)
HDLC SERPL-MCNC: 57 MG/DL
HGB BLD-MCNC: 13.2 G/DL (ref 11.5–15.4)
IMM GRANULOCYTES # BLD AUTO: 0.01 THOUSAND/UL (ref 0–0.2)
IMM GRANULOCYTES NFR BLD AUTO: 0 % (ref 0–2)
IRON SATN MFR SERPL: 28 % (ref 15–50)
IRON SERPL-MCNC: 105 UG/DL (ref 50–212)
LDLC SERPL CALC-MCNC: 119 MG/DL (ref 0–100)
LYMPHOCYTES # BLD AUTO: 2.28 THOUSANDS/ÂΜL (ref 0.6–4.47)
LYMPHOCYTES NFR BLD AUTO: 45 % (ref 14–44)
MCH RBC QN AUTO: 31.5 PG (ref 26.8–34.3)
MCHC RBC AUTO-ENTMCNC: 32.8 G/DL (ref 31.4–37.4)
MCV RBC AUTO: 96 FL (ref 82–98)
MONOCYTES # BLD AUTO: 0.47 THOUSAND/ÂΜL (ref 0.17–1.22)
MONOCYTES NFR BLD AUTO: 9 % (ref 4–12)
NEUTROPHILS # BLD AUTO: 2.07 THOUSANDS/ÂΜL (ref 1.85–7.62)
NEUTS SEG NFR BLD AUTO: 40 % (ref 43–75)
NRBC BLD AUTO-RTO: 0 /100 WBCS
PLATELET # BLD AUTO: 267 THOUSANDS/UL (ref 149–390)
PMV BLD AUTO: 11.1 FL (ref 8.9–12.7)
POTASSIUM SERPL-SCNC: 3.8 MMOL/L (ref 3.5–5.3)
PROT SERPL-MCNC: 7 G/DL (ref 6.4–8.4)
RBC # BLD AUTO: 4.19 MILLION/UL (ref 3.81–5.12)
SODIUM SERPL-SCNC: 139 MMOL/L (ref 135–147)
T4 FREE SERPL-MCNC: 0.73 NG/DL (ref 0.61–1.12)
TIBC SERPL-MCNC: 371 UG/DL (ref 250–450)
TRIGL SERPL-MCNC: 271 MG/DL
TSH SERPL DL<=0.05 MIU/L-ACNC: 1.85 UIU/ML (ref 0.45–4.5)
UIBC SERPL-MCNC: 266 UG/DL (ref 155–355)
WBC # BLD AUTO: 5.12 THOUSAND/UL (ref 4.31–10.16)

## 2024-06-21 PROCEDURE — 73630 X-RAY EXAM OF FOOT: CPT

## 2024-06-21 PROCEDURE — 83540 ASSAY OF IRON: CPT

## 2024-06-21 PROCEDURE — 80061 LIPID PANEL: CPT

## 2024-06-21 PROCEDURE — 36415 COLL VENOUS BLD VENIPUNCTURE: CPT

## 2024-06-21 PROCEDURE — 83550 IRON BINDING TEST: CPT

## 2024-06-21 PROCEDURE — 85025 COMPLETE CBC W/AUTO DIFF WBC: CPT

## 2024-06-21 PROCEDURE — 84439 ASSAY OF FREE THYROXINE: CPT

## 2024-06-21 PROCEDURE — 82728 ASSAY OF FERRITIN: CPT

## 2024-06-21 PROCEDURE — 82306 VITAMIN D 25 HYDROXY: CPT

## 2024-06-21 PROCEDURE — 80053 COMPREHEN METABOLIC PANEL: CPT

## 2024-06-21 PROCEDURE — 84443 ASSAY THYROID STIM HORMONE: CPT

## 2024-06-24 ENCOUNTER — TELEPHONE (OUTPATIENT)
Dept: FAMILY MEDICINE CLINIC | Facility: CLINIC | Age: 54
End: 2024-06-24

## 2024-06-24 NOTE — TELEPHONE ENCOUNTER
----- Message from Everton Vasquez DO sent at 6/24/2024  2:53 PM EDT -----  Please call the patient regarding her abnormal result.     IMPRESSION:        No acute osseous abnormality. Moderate-sized plantar calcaneal spur    Patient to see podiatry as directed.

## 2024-06-24 NOTE — TELEPHONE ENCOUNTER
----- Message from Everton Vasquez DO sent at 6/24/2024  2:52 PM EDT -----  Please call the patient regarding her abnormal result. Low fat and low sugar diet encouraged to help lower trigs and also rec Vitamin D3 5000 IU daily for low vitamin D.

## 2024-06-27 RX ORDER — SODIUM CHLORIDE 9 MG/ML
125 INJECTION, SOLUTION INTRAVENOUS CONTINUOUS
Status: CANCELLED | OUTPATIENT
Start: 2024-06-27

## 2024-06-28 ENCOUNTER — HOSPITAL ENCOUNTER (OUTPATIENT)
Dept: MAMMOGRAPHY | Facility: MEDICAL CENTER | Age: 54
Discharge: HOME | End: 2024-06-28
Payer: COMMERCIAL

## 2024-06-28 ENCOUNTER — HOSPITAL ENCOUNTER (OUTPATIENT)
Dept: GASTROENTEROLOGY | Facility: MEDICAL CENTER | Age: 54
Setting detail: OUTPATIENT SURGERY
Discharge: HOME | End: 2024-06-28

## 2024-06-28 VITALS
DIASTOLIC BLOOD PRESSURE: 72 MMHG | HEIGHT: 62 IN | SYSTOLIC BLOOD PRESSURE: 163 MMHG | HEART RATE: 55 BPM | WEIGHT: 176 LBS | RESPIRATION RATE: 15 BRPM | TEMPERATURE: 97.7 F | OXYGEN SATURATION: 97 % | BODY MASS INDEX: 32.39 KG/M2

## 2024-06-28 VITALS — WEIGHT: 176 LBS | HEIGHT: 62 IN | BODY MASS INDEX: 32.39 KG/M2

## 2024-06-28 DIAGNOSIS — Z12.31 ENCOUNTER FOR SCREENING MAMMOGRAM FOR MALIGNANT NEOPLASM OF BREAST: ICD-10-CM

## 2024-06-28 DIAGNOSIS — Z12.11 SCREENING FOR COLON CANCER: ICD-10-CM

## 2024-06-28 PROCEDURE — 77067 SCR MAMMO BI INCL CAD: CPT

## 2024-06-28 PROCEDURE — 77063 BREAST TOMOSYNTHESIS BI: CPT

## 2024-06-28 RX ORDER — SODIUM CHLORIDE 9 MG/ML
125 INJECTION, SOLUTION INTRAVENOUS CONTINUOUS
Status: DISCONTINUED | OUTPATIENT
Start: 2024-06-28 | End: 2024-07-02 | Stop reason: HOSPADM

## 2024-06-28 NOTE — H&P
History and Physical - [facility]  Yadira Garcia 53 y.o. female MRN: [id number]          HPI: Yadira Garcia is a 53 y.o. year old female who presents for open access screening colonoscopy.     Unfortunately, patient admits to eating a full breakfast, lunch, and dinner yesterday. Despite drinking bowel prep, stool is not clear. I recommend rescheduling the colonoscopy.

## 2025-01-09 ENCOUNTER — OFFICE VISIT (OUTPATIENT)
Dept: FAMILY MEDICINE CLINIC | Facility: CLINIC | Age: 55
End: 2025-01-09
Payer: COMMERCIAL

## 2025-01-09 VITALS
WEIGHT: 174.8 LBS | SYSTOLIC BLOOD PRESSURE: 120 MMHG | HEART RATE: 84 BPM | BODY MASS INDEX: 32.17 KG/M2 | HEIGHT: 62 IN | OXYGEN SATURATION: 98 % | DIASTOLIC BLOOD PRESSURE: 82 MMHG | TEMPERATURE: 98 F

## 2025-01-09 DIAGNOSIS — Z00.00 HEALTH MAINTENANCE EXAMINATION: Primary | ICD-10-CM

## 2025-01-09 DIAGNOSIS — E78.1 ESSENTIAL HYPERTRIGLYCERIDEMIA: ICD-10-CM

## 2025-01-09 DIAGNOSIS — G89.29 CHRONIC RIGHT SHOULDER PAIN: ICD-10-CM

## 2025-01-09 DIAGNOSIS — R79.89 LOW VITAMIN D LEVEL: ICD-10-CM

## 2025-01-09 DIAGNOSIS — M79.672 LEFT FOOT PAIN: ICD-10-CM

## 2025-01-09 DIAGNOSIS — M25.511 CHRONIC RIGHT SHOULDER PAIN: ICD-10-CM

## 2025-01-09 PROCEDURE — 99213 OFFICE O/P EST LOW 20 MIN: CPT | Performed by: NURSE PRACTITIONER

## 2025-01-09 PROCEDURE — 99396 PREV VISIT EST AGE 40-64: CPT | Performed by: NURSE PRACTITIONER

## 2025-01-09 NOTE — PROGRESS NOTES
Adult Annual Physical  Name: Yadira Garcia      : 1970      MRN: [id number]  Encounter Provider: KALIA Cedeño  Encounter Date: 2025   Encounter department: [facility] PRIMARY [facility]    Assessment & Plan  Health maintenance examination  Complete colon cancer screening. Has active referral to GI.  Orders:  •  TSH, 3rd generation with Free T4 reflex; Future  •  Lipid panel; Future  •  CBC and differential; Future  •  Comprehensive metabolic panel; Future    Left foot pain  Imaging done  that showed heel spur- has appointment with podiatry. Encouraged supportive shoes, rest when possible.   Tylenol 650 mg every 8 hours   Ibuprofen 400 mg every 6 hours as needed.   Make sure you have food on your stomach.   Work note provided.        Low vitamin D level  Labs due in .  Orders:  •  Lipid panel; Future  •  Vitamin D 25 hydroxy; Future    Essential hypertriglyceridemia  Labs due in .       Chronic right shoulder pain  Complete imaging.   Orders:  •  XR shoulder 2+ vw right; Future    Immunizations and preventive care screenings were discussed with patient today. Appropriate education was printed on patient's after visit summary.    Counseling:  Dental Health: discussed importance of regular tooth brushing, flossing, and dental visits.         History of Present Illness     Adult Annual Physical:  Patient presents for annual physical. Here for wellness visit.     Left foot pain:   Has to take at least ibuprofen 800 mg for pain in left foot. Has appointment with podiatry. 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     Pain in right shoulder too. New. With lifting shoulder. No injury. Present x 2 months. No swelling or bruising. .     Diet and Physical Activity:  - Diet/Nutrition: poor diet.  - Exercise:. limited due to foot pain    Depression Screening:  - PHQ-2 Score: 0    General Health:  - Sleep: sleeps well.  - Vision: wears glasses and goes for  "regular eye exams.  - Dental: regular dental visits.    /GYN Health:  - Follows with GYN: yes.     Review of Systems   Constitutional:  Negative for chills and fever.   Eyes:  Negative for discharge.   Respiratory:  Negative for shortness of breath.    Cardiovascular:  Negative for chest pain.   Gastrointestinal:  Negative for constipation and diarrhea.   Genitourinary:  Negative for difficulty urinating.   Musculoskeletal:  Positive for arthralgias. Negative for joint swelling.   Skin:  Negative for rash.   Neurological:  Negative for headaches.   Hematological:  Negative for adenopathy.   Psychiatric/Behavioral:  The patient is not nervous/anxious.          Objective   /82   Pulse 84   Temp 98 °F (36.7 °C)   Ht 5' 1.5\" (1.562 m)   Wt 79.3 kg (174 lb 12.8 oz)   LMP 02/09/2021   SpO2 98%   BMI 32.49 kg/m²     Physical Exam  Vitals and nursing note reviewed.   Constitutional:       General: She is not in acute distress.     Appearance: Normal appearance. She is not ill-appearing, toxic-appearing or diaphoretic.   HENT:      Head: Normocephalic and atraumatic.      Right Ear: External ear normal.      Left Ear: External ear normal.      Nose: Nose normal.      Mouth/Throat:      Mouth: Mucous membranes are moist.      Pharynx: Oropharynx is clear. No oropharyngeal exudate or posterior oropharyngeal erythema.   Eyes:      General: Lids are normal. No scleral icterus.        Right eye: No discharge.         Left eye: No discharge.      Extraocular Movements: Extraocular movements intact.      Conjunctiva/sclera: Conjunctivae normal.      Pupils: Pupils are equal, round, and reactive to light.   Cardiovascular:      Rate and Rhythm: Normal rate and regular rhythm. No extrasystoles are present.     Heart sounds: S1 normal and S2 normal. No murmur heard.  Pulmonary:      Effort: Pulmonary effort is normal. No respiratory distress.      Breath sounds: Normal breath sounds. No stridor or decreased air " movement. No wheezing or rhonchi.   Abdominal:      General: Bowel sounds are normal.      Palpations: Abdomen is soft.      Tenderness: There is no abdominal tenderness.   Musculoskeletal:      Right shoulder: Tenderness present. No swelling, deformity or crepitus. Decreased range of motion.      Left shoulder: No swelling, deformity, tenderness or crepitus. Normal range of motion.      Cervical back: Normal range of motion and neck supple.        Feet:       Comments: Right shoulder pain with lateral and anterior elevation   Skin:     General: Skin is warm and dry.   Neurological:      General: No focal deficit present.      Mental Status: She is alert and oriented to person, place, and time. Mental status is at baseline.      Cranial Nerves: No cranial nerve deficit.      Sensory: No sensory deficit.      Motor: No weakness.      Coordination: Coordination normal.      Gait: Gait normal.   Psychiatric:         Attention and Perception: Attention and perception normal.         Mood and Affect: Mood and affect normal.         Speech: Speech normal.         Behavior: Behavior is cooperative.         Cognition and Memory: Cognition normal.         Judgment: Judgment normal.

## 2025-01-09 NOTE — PATIENT INSTRUCTIONS
Labs due in June.    Follow up with podiatry.     Note for work provided.     For left foot pain:  Tylenol 650 mg every 8 hours   Ibuprofen 400 mg every 6 hours as needed.   Make sure you have food on your stomach.     Please call the office if you are experiencing any worsening of symptoms or no symptom improvement.

## 2025-01-09 NOTE — LETTER
January 9, 2025     Patient: Yadira Garcia  YOB: 1970  Date of Visit: 1/9/2025      To Whom it May Concern:    Yadira Garcia is under my professional care. Yadira was seen in my office on 1/9/2025. It is recommend Yadira perform work duties that avoid any consistent walking great than 15 minutes at a time. No restrictions on standing. This is a temporary recommendation until further medical management occurs. Restriction recommended until 2/10/25.     If you have any questions or concerns, please don't hesitate to call.         Sincerely,          KALIA Cedeño        CC: No Recipients

## 2025-01-13 ENCOUNTER — TELEPHONE (OUTPATIENT)
Age: 55
End: 2025-01-13

## 2025-01-13 NOTE — TELEPHONE ENCOUNTER
Pt unable to view letter for her job in her chart. Please send work letter from 1/9/25 as a message in [facility].

## 2025-01-16 ENCOUNTER — HOSPITAL ENCOUNTER (OUTPATIENT)
Dept: RADIOLOGY | Facility: HOSPITAL | Age: 55
Discharge: HOME | End: 2025-01-16
Payer: COMMERCIAL

## 2025-01-16 DIAGNOSIS — G89.29 CHRONIC RIGHT SHOULDER PAIN: ICD-10-CM

## 2025-01-16 DIAGNOSIS — M25.511 CHRONIC RIGHT SHOULDER PAIN: ICD-10-CM

## 2025-01-16 PROCEDURE — 73030 X-RAY EXAM OF SHOULDER: CPT

## 2025-01-17 ENCOUNTER — APPOINTMENT (OUTPATIENT)
Dept: LAB | Facility: HOSPITAL | Age: 55
End: 2025-01-17
Payer: COMMERCIAL

## 2025-01-17 DIAGNOSIS — R79.89 LOW VITAMIN D LEVEL: ICD-10-CM

## 2025-01-17 DIAGNOSIS — Z00.00 HEALTH MAINTENANCE EXAMINATION: ICD-10-CM

## 2025-01-17 LAB
25(OH)D3 SERPL-MCNC: 28.9 NG/ML (ref 30–100)
ALBUMIN SERPL BCG-MCNC: 4.3 G/DL (ref 3.5–5)
ALP SERPL-CCNC: 52 U/L (ref 34–104)
ALT SERPL W P-5'-P-CCNC: 13 U/L (ref 7–52)
ANION GAP SERPL CALCULATED.3IONS-SCNC: 5 MMOL/L (ref 4–13)
AST SERPL W P-5'-P-CCNC: 14 U/L (ref 13–39)
BASOPHILS # BLD AUTO: 0.06 THOUSANDS/ΜL (ref 0–0.1)
BASOPHILS NFR BLD AUTO: 1 % (ref 0–1)
BILIRUB SERPL-MCNC: 0.44 MG/DL (ref 0.2–1)
BUN SERPL-MCNC: 17 MG/DL (ref 5–25)
CALCIUM SERPL-MCNC: 9.7 MG/DL (ref 8.4–10.2)
CHLORIDE SERPL-SCNC: 104 MMOL/L (ref 96–108)
CHOLEST SERPL-MCNC: 221 MG/DL (ref ?–200)
CO2 SERPL-SCNC: 30 MMOL/L (ref 21–32)
CREAT SERPL-MCNC: 0.76 MG/DL (ref 0.6–1.3)
EOSINOPHIL # BLD AUTO: 0.15 THOUSAND/ΜL (ref 0–0.61)
EOSINOPHIL NFR BLD AUTO: 2 % (ref 0–6)
ERYTHROCYTE [DISTWIDTH] IN BLOOD BY AUTOMATED COUNT: 12.7 % (ref 11.6–15.1)
GFR SERPL CREATININE-BSD FRML MDRD: 89 ML/MIN/1.73SQ M
GLUCOSE P FAST SERPL-MCNC: 89 MG/DL (ref 65–99)
HCT VFR BLD AUTO: 41.4 % (ref 34.8–46.1)
HDLC SERPL-MCNC: 64 MG/DL
HGB BLD-MCNC: 13.6 G/DL (ref 11.5–15.4)
IMM GRANULOCYTES # BLD AUTO: 0.01 THOUSAND/UL (ref 0–0.2)
IMM GRANULOCYTES NFR BLD AUTO: 0 % (ref 0–2)
LDLC SERPL CALC-MCNC: 125 MG/DL (ref 0–100)
LYMPHOCYTES # BLD AUTO: 2.14 THOUSANDS/ΜL (ref 0.6–4.47)
LYMPHOCYTES NFR BLD AUTO: 35 % (ref 14–44)
MCH RBC QN AUTO: 31.8 PG (ref 26.8–34.3)
MCHC RBC AUTO-ENTMCNC: 32.9 G/DL (ref 31.4–37.4)
MCV RBC AUTO: 97 FL (ref 82–98)
MONOCYTES # BLD AUTO: 0.56 THOUSAND/ΜL (ref 0.17–1.22)
MONOCYTES NFR BLD AUTO: 9 % (ref 4–12)
NEUTROPHILS # BLD AUTO: 3.28 THOUSANDS/ΜL (ref 1.85–7.62)
NEUTS SEG NFR BLD AUTO: 53 % (ref 43–75)
NONHDLC SERPL-MCNC: 157 MG/DL
NRBC BLD AUTO-RTO: 0 /100 WBCS
PLATELET # BLD AUTO: 322 THOUSANDS/UL (ref 149–390)
PMV BLD AUTO: 10.8 FL (ref 8.9–12.7)
POTASSIUM SERPL-SCNC: 4.1 MMOL/L (ref 3.5–5.3)
PROT SERPL-MCNC: 7.4 G/DL (ref 6.4–8.4)
RBC # BLD AUTO: 4.28 MILLION/UL (ref 3.81–5.12)
SODIUM SERPL-SCNC: 139 MMOL/L (ref 135–147)
TRIGL SERPL-MCNC: 162 MG/DL (ref ?–150)
TSH SERPL DL<=0.05 MIU/L-ACNC: 1.2 UIU/ML (ref 0.45–4.5)
WBC # BLD AUTO: 6.2 THOUSAND/UL (ref 4.31–10.16)

## 2025-01-17 PROCEDURE — 84443 ASSAY THYROID STIM HORMONE: CPT

## 2025-01-17 PROCEDURE — 80061 LIPID PANEL: CPT

## 2025-01-17 PROCEDURE — 82306 VITAMIN D 25 HYDROXY: CPT

## 2025-01-17 PROCEDURE — 36415 COLL VENOUS BLD VENIPUNCTURE: CPT

## 2025-01-17 PROCEDURE — 80053 COMPREHEN METABOLIC PANEL: CPT

## 2025-01-17 PROCEDURE — 85025 COMPLETE CBC W/AUTO DIFF WBC: CPT

## 2025-01-20 ENCOUNTER — RESULTS FOLLOW-UP (OUTPATIENT)
Dept: FAMILY MEDICINE CLINIC | Facility: CLINIC | Age: 55
End: 2025-01-20

## 2025-02-07 ENCOUNTER — OFFICE VISIT (OUTPATIENT)
Dept: PODIATRY | Facility: CLINIC | Age: 55
End: 2025-02-07
Payer: COMMERCIAL

## 2025-02-07 VITALS — WEIGHT: 174 LBS | BODY MASS INDEX: 32.85 KG/M2 | HEIGHT: 61 IN

## 2025-02-07 DIAGNOSIS — M72.2 PLANTAR FASCIITIS: Primary | ICD-10-CM

## 2025-02-07 DIAGNOSIS — M79.672 LEFT FOOT PAIN: ICD-10-CM

## 2025-02-07 PROCEDURE — 20550 NJX 1 TENDON SHEATH/LIGAMENT: CPT

## 2025-02-07 PROCEDURE — 99204 OFFICE O/P NEW MOD 45 MIN: CPT

## 2025-02-07 RX ORDER — MELOXICAM 7.5 MG/1
7.5 TABLET ORAL DAILY
Qty: 14 TABLET | Refills: 0 | Status: SHIPPED | OUTPATIENT
Start: 2025-02-07

## 2025-02-07 RX ORDER — LIDOCAINE HYDROCHLORIDE 10 MG/ML
1 INJECTION, SOLUTION INFILTRATION; PERINEURAL
Status: SHIPPED | OUTPATIENT
Start: 2025-02-07

## 2025-02-07 RX ORDER — TRIAMCINOLONE ACETONIDE 40 MG/ML
0.5 INJECTION, SUSPENSION INTRA-ARTICULAR; INTRAMUSCULAR
Status: SHIPPED | OUTPATIENT
Start: 2025-02-07

## 2025-02-07 RX ADMIN — TRIAMCINOLONE ACETONIDE 0.5 MG: 40 INJECTION, SUSPENSION INTRA-ARTICULAR; INTRAMUSCULAR at 09:00

## 2025-02-07 RX ADMIN — LIDOCAINE HYDROCHLORIDE 1 ML: 10 INJECTION, SOLUTION INFILTRATION; PERINEURAL at 09:00

## 2025-02-07 NOTE — ASSESSMENT & PLAN NOTE
Orders:    Ambulatory Referral to Podiatry    XR foot 3+ vw left; Future    P.F. Night Splint    meloxicam (Mobic) 7.5 mg tablet; Take 1 tablet (7.5 mg total) by mouth daily    Ambulatory Referral to Physical Therapy; Future

## 2025-02-07 NOTE — PROGRESS NOTES
Name: Yadira Garcia      : 1970      MRN: [id number]  Encounter Provider: Vernon Aguilar DPM  Encounter Date: 2025   Encounter department: [facility] PODIATRY [place]  :  Assessment & Plan  Left foot pain    Orders:    Ambulatory Referral to Podiatry    XR foot 3+ vw left; Future    P.F. Night Splint    meloxicam (Mobic) 7.5 mg tablet; Take 1 tablet (7.5 mg total) by mouth daily    Ambulatory Referral to Physical Therapy; Future    Plantar fasciitis    Orders:    meloxicam (Mobic) 7.5 mg tablet; Take 1 tablet (7.5 mg total) by mouth daily    Ambulatory Referral to Physical Therapy; Future    -Patient was educated regarding their condition.  -Rx night splint  -Educated patient on plantar fasciitis stretching program to be performed daily  -Recommend purchase of supportive shoes with wide toe box. Recommend purchase of OTC orthosis (superfeet, powersteps, or tread labs).  -Rx meloxicam  -Referral to physical therapy  -Corticosteroid injection given today  -Patient will RTC in 6-weeks    -X-ray from today of the left foot was interpreted by myself: no acute osseous abnormalities     Foot/lower extremity injection    Performed by: Vernon Aguilar DPM  Authorized by: Vernon Aguilar DPM    Procedure:     Other Assisting Provider: No      Verbal consent obtained?: Yes      Risks and benefits: Risks, benefits and alternatives were discussed      Consent given by:  Patient    Time out performed at:  2025 9:40 AM    Patient states understanding of procedure being performed: Yes      Patient identity confirmed:  Verbally with patient    Supporting Documentation:     Indications:  Pain    Procedure Details:    Prep: patient was prepped and draped in usual sterile fashion                Ethyl Chloride was applied      Needle size: 27 G G    Ultrasound Guidance: no      Approach:  Medial    Laterality:  Left    Location: aponeurosis      Aponeurosis Structures: Plantar fascia origin      Injection  "Information:       Medications:  1 mL lidocaine 1 %; 0.5 mg triamcinolone acetonide 40 mg/mL    Patient tolerance:  Patient tolerated the procedure well with no immediate complications    Dressing: sterile dressing applied            History of Present Illness   HPI  Yadira Garcia is a 54 y.o. female who presents left heel pain.  Patient reports severe pain to left heel when walking and while at work.  She reports she has been taking over-the-counter pain medication with minimal improvement in pain symptoms.    History obtained from: patient    Review of Systems  Current Outpatient Medications on File Prior to Visit   Medication Sig Dispense Refill    acetaminophen (TYLENOL) 325 mg tablet Take 2 tablets (650 mg total) by mouth every 6 (six) hours as needed for mild pain, moderate pain, headaches or fever 30 tablet 0     No current facility-administered medications on file prior to visit.         Objective   Ht 5' 1\" (1.549 m)   Wt 78.9 kg (174 lb)   LMP 02/09/2021   BMI 32.88 kg/m²      Physical Exam    MSK:  -Pain on palpation of medial calcaneal tubercle at the insertion site of the plantar fascia  -no pain with compression of both sides of heel  -No gross deformities noted  -Active range of motion lesser digits intact  -MMT 5/5 to all muscle compartments of the lower extremity  -Ankle dorsiflexion is less than 10 degrees with knee extended, and knee flexed    Derm:  -No lesions, abrasions, or open wounds noted  -No noted interdigital maceration, peeling, malodor  -No areas of callus formation noted on exam  -no erythema, ecchymosis, edema noted     Vascular:  -DP and PT pulses intact b/l  -Capillary refill time <2 sec b/l  -Skin temp: WNL    Neuro:  -Light sensation intact bilaterally  -Protective sensation intact bilaterally  -Tinel sign negative    "

## 2025-02-19 NOTE — TELEPHONE ENCOUNTER
"Assessment & Plan     Microscopic hematuria  Persistent trace hematuria with RBCs present on last UA. Denies flank pain, fever, history of kidney issues, but worth investigating further. Will recheck today and refer to urology for further workup if the hematuria persists.  - UA Macroscopic with reflex to Microscopic and Culture - Lab Collect; Future    Alterations of sensations  Multiple vague but bothersome symptoms including brain fog, fatigue, eyelid twitching, faint perioral tingling, and sensitivity to light in the last several months. Differentials include dehydration or electrolyte imbalances (check BMP), hypothyroidism (check TSH), anxiety, anemia (less likely, as recent Hgb was normal), or B12 deficiency (less likely, no history of malabsorption, no anemia, no medications that cause B12 deficiency).  Continue with regular physical activity and stress management.    - Basic metabolic panel  (Ca, Cl, CO2, Creat, Gluc, K, Na, BUN); Future  - TSH with free T4 reflex; Future  - Vitamin B12    Tingling of left upper extremity  Suspect shoulder impingement. Tingling along the entire left arm for two months associated with left hand dominance and significant left deltoid and suprascapular muscle hypertrophy compared to right. Tingling is bothersome and persistent but not painful. No neurological or strength deficits. No history of injury, swelling or redness of shoulder. Full ROM of shoulder and arm. Discussed return for care if tingling fails to improve with physical therapy for consideration of cervical spine MRI.  - Physical Therapy  Referral; Future    BMI  Estimated body mass index is 25.21 kg/m  as calculated from the following:    Height as of this encounter: 1.703 m (5' 7.05\").    Weight as of this encounter: 73.1 kg (161 lb 3.2 oz).     Follow-up in ~2 months for routine physical    Subjective   Heather is a 42 year old, presenting for the following health issues:  RECHECK (Tingling in left arm " I called the patient and I left a voicemail reminding her that she should get her blood work done prior her appointment otherwise we have to reschedule it  "and hand for past 2 months./Recurring dull headaches for past 2 weeks)        2/19/2025    11:16 AM   Additional Questions   Roomed by KTR     Via the Health Maintenance questionnaire, the patient has reported the following services have been completed -Eye Exam: [facility] 2024-11-01, this information has been sent to the abstraction team.    History of Present Illness       Headaches:   Since the patient's last clinic visit, headaches are: worsened  The patient is getting headaches:  Intermittent  She is not able to do normal daily activities when she has a migraine.  The patient is taking the following rescue/relief medications:  Ibuprofen (Advil, Motrin)   Patient states \"The relief is inconsistent\" from the rescue/relief medications.   The patient is taking the following medications to prevent migraines:  No medications to prevent migraines  In the past 4 weeks, the patient has gone to an Urgent Care or Emergency Room 0 times times due to headaches.   She is taking medications regularly.    Bryce Romero presents with a dull headache which she first noticed about two weeks ago. She describes it as less painful, rather more like 'brain fog'. She describes a bothersome but vague sense that things are 'off'. She does have sensitivity to light. No nausea. She has experienced migraines in the last several months with her periods, including nausea, vomiting, and needing to rest to get through the pain. Migraines resolve on their own or with ibuprofen.    She also is concerned about eyelid twitching more regularly. Her eyelid twitches persistently for 20 minutes and it is distracting and bothersome but not painful. She denies visual disturbances. She has had some stress related to her health as she was recently diagnosed with basal cell carcinoma of the lower left eyelid.   She generally averages around 8 hours of sleep, denies difficulty falling asleep, but can wake up not feeling fully rested.  Denies " "frequent night-time wakenings.     Left arm  Tingling down the length of her left arm. Tingling is persistent but sometimes worse than others. Squeezes arm to help soothe tingling. Left arm dominant. Tightness in shoulders and tends to clench her jaw which makes her feel more tense. No decreased strength. No tingling in the leg or one side of the face.    Gyn  Notes irregular periods, every 19-38 days. Irregularity started about six months ago. Sister is 45 and has not gone through menopause. Mother had hysterectomy in her 30's.    ROS  Constitutional: Negative for fever, chills, malaise. Positive for fatigue.  HEENT: Negative for visual changes. Positive for light sensitivity and eyelid twitching.  CV: Negative for chest pressure or pain, negative for palpitations.  Resp: Negative for difficulty breathing  Abd: Negative for nausea or vomiting except during migraine episode several weeks ago  : Negative for flank pain  Neuro: Positive for left arm tingling throughout the arm. Negative for numbness. Negative for poor coordination or balance.  MSK: Positive for jaw and shoulder tension. Negative for decreased strength or range of motion.      Objective    /77 (BP Location: Right arm, Patient Position: Sitting, Cuff Size: Adult Regular)   Pulse 74   Temp 98  F (36.7  C) (Oral)   Resp 18   Ht 1.703 m (5' 7.05\")   Wt 73.1 kg (161 lb 3.2 oz)   SpO2 100%   BMI 25.21 kg/m    Body mass index is 25.21 kg/m .    Physical Exam   GENERAL: alert and no distress  EYES: Eyes grossly normal to inspection, PERRL and conjunctivae and sclerae normal  HENT: ear canals and TM's normal, nose and mouth without ulcers or lesions  NECK: no adenopathy, no asymmetry, masses, or scars  RESP: lungs clear to auscultation - no rales, rhonchi or wheezes  CV: regular rate and rhythm, normal S1 S2, no S3 or S4, no murmur, click or rub, no peripheral edema  MS: left suprascapular and left deltoid muscular hypertrophy  NEURO: strength " 5/5, tone and sensation intact BUE and BLE, cranial nerves intact, speech is clear, normal gait  PSYCH: mentation appears normal, tears brimming, affect normal/bright          Cammy Villegas RN, Student FNP    I saw and examined this patient with the NP student and agree with the student's findings and plan of care as documented in the student's note with the following modifications: none    Signed Electronically by: HARVEY Ward CNP

## 2025-03-17 ENCOUNTER — TELEPHONE (OUTPATIENT)
Dept: GYNECOLOGY | Facility: CLINIC | Age: 55
End: 2025-03-17

## 2025-03-17 NOTE — TELEPHONE ENCOUNTER
Patient has appointment for 5/2/2025 with Dr. Cline, Appointment needs to be rescheduled, Left voice message for patient to call office to r/s appointment, [facility] message also sent to patient

## 2025-06-25 ENCOUNTER — TELEPHONE (OUTPATIENT)
Age: 55
End: 2025-06-25

## 2025-06-25 NOTE — TELEPHONE ENCOUNTER
Patient submitted request via [employer] for annual exam with Dr Cline.   Pt notes preferred times: Thurs or Fri morning (first available: Friday 2/20/26)    Called pt, left non-detailed vm & sent mcm to review options.

## 2025-07-03 NOTE — TELEPHONE ENCOUNTER
Called pt, introduced myself however asking if pt can verify their name &  to ensure I have the correct pt, they disconnected the call.     Pt submitted request for an annual with Dr Cline in [place].     Sent an additional Mychart message also.

## 2025-07-25 ENCOUNTER — OFFICE VISIT (OUTPATIENT)
Dept: PODIATRY | Facility: CLINIC | Age: 55
End: 2025-07-25
Payer: COMMERCIAL

## 2025-07-25 VITALS — HEIGHT: 61 IN | WEIGHT: 174 LBS | BODY MASS INDEX: 32.85 KG/M2

## 2025-07-25 DIAGNOSIS — M79.672 LEFT FOOT PAIN: Primary | ICD-10-CM

## 2025-07-25 DIAGNOSIS — M72.2 PLANTAR FASCIITIS: ICD-10-CM

## 2025-07-25 PROCEDURE — 99213 OFFICE O/P EST LOW 20 MIN: CPT

## 2025-07-25 PROCEDURE — 20550 NJX 1 TENDON SHEATH/LIGAMENT: CPT

## 2025-07-25 RX ORDER — TRIAMCINOLONE ACETONIDE 40 MG/ML
0.5 INJECTION, SUSPENSION INTRA-ARTICULAR; INTRAMUSCULAR
Status: SHIPPED | OUTPATIENT
Start: 2025-07-25

## 2025-07-25 RX ORDER — LIDOCAINE HYDROCHLORIDE 10 MG/ML
0.5 INJECTION, SOLUTION INFILTRATION; PERINEURAL
Status: SHIPPED | OUTPATIENT
Start: 2025-07-25

## 2025-07-25 RX ADMIN — TRIAMCINOLONE ACETONIDE 0.5 MG: 40 INJECTION, SUSPENSION INTRA-ARTICULAR; INTRAMUSCULAR at 08:45

## 2025-07-25 RX ADMIN — LIDOCAINE HYDROCHLORIDE 0.5 ML: 10 INJECTION, SOLUTION INFILTRATION; PERINEURAL at 08:45

## 2025-07-25 NOTE — PROGRESS NOTES
Name: Yadira Garcia      : 1970      MRN: [id number]  Encounter Provider: Vernon Aguilar DPM  Encounter Date: 2025   Encounter department: [facility] PODIATRY [place]  :  Assessment & Plan  Left foot pain         Plantar fasciitis         -Patient was educated regarding their condition.  - Continue night splint  - Continue plantar fasciitis stretching program to be performed daily, 5 times throughout the day  -Recommend purchase of supportive shoes with wide toe box. Recommend purchase of OTC orthosis (superfeet, powersteps, or tread labs).  -Referral to physical therapy made last appointment please follow-up  -Corticosteroid injection given today, see procedure note          Foot/lower extremity injection    Performed by: Vernon Aguilar DPM  Authorized by: Vernon Aguilar DPM    Procedure:     Other Assisting Provider: No      Verbal consent obtained?: Yes      Risks and benefits: Risks, benefits and alternatives were discussed      Consent given by:  Patient    Time out performed at:  2025 9:44 AM    Patient states understanding of procedure being performed: Yes      Patient identity confirmed:  Verbally with patient    Supporting Documentation:     Indications:  Pain    Procedure Details:    Prep: patient was prepped and draped in usual sterile fashion                Ethyl Chloride was applied      Needle size: 25 G G    Ultrasound Guidance: no      Approach:  Medial    Laterality:  Left    Location: aponeurosis      Aponeurosis Structures: Plantar fascia origin      Injection Information:       Medications:  0.5 mL lidocaine 1 %; 0.5 mg triamcinolone acetonide 40 mg/mL    Patient tolerance:  Patient tolerated the procedure well with no immediate complications    Dressing: sterile dressing applied            History of Present Illness   HPI  Yadira Garcia is a 54 y.o. female who presents left heel pain.  Patient reports severe pain to left heel when walking and while at work.  She  "reports she has been taking over-the-counter pain medication with minimal improvement in pain symptoms.    History obtained from: patient    Review of Systems  Current Outpatient Medications on File Prior to Visit   Medication Sig Dispense Refill    acetaminophen (TYLENOL) 325 mg tablet Take 2 tablets (650 mg total) by mouth every 6 (six) hours as needed for mild pain, moderate pain, headaches or fever 30 tablet 0    meloxicam (Mobic) 7.5 mg tablet Take 1 tablet (7.5 mg total) by mouth daily 14 tablet 0     Current Facility-Administered Medications on File Prior to Visit   Medication Dose Route Frequency Provider Last Rate Last Admin    lidocaine (XYLOCAINE) 1 % injection 1 mL  1 mL Infiltration     1 mL at 02/07/25 0900    triamcinolone acetonide (Kenalog-40) 40 mg/mL injection 0.5 mg  0.5 mg Infiltration     0.5 mg at 02/07/25 0900         Objective   Ht 5' 1\" (1.549 m)   Wt 78.9 kg (174 lb)   LMP 02/09/2021   BMI 32.88 kg/m²      Physical Exam    MSK:  -Pain on palpation of medial calcaneal tubercle at the insertion site of the plantar fascia left foot  -no pain with compression of both sides of heel  -No gross deformities noted  -Active range of motion lesser digits intact  -MMT 5/5 to all muscle compartments of the lower extremity  -Ankle dorsiflexion is less than 10 degrees with knee extended, and knee flexed    Derm:  -No lesions, abrasions, or open wounds noted  -No noted interdigital maceration, peeling, malodor  -No areas of callus formation noted on exam  -no erythema, ecchymosis, edema noted     Vascular:  -DP and PT pulses intact b/l  -Capillary refill time <2 sec b/l  -Skin temp: WNL    Neuro:  -Light sensation intact bilaterally  -Protective sensation intact bilaterally  -Tinel sign negative    "

## 2025-08-22 ENCOUNTER — TELEPHONE (OUTPATIENT)
Age: 55
End: 2025-08-22

## (undated) DEVICE — TUBING SMOKE EVAC W/FILTRATION DEVICE PLUMEPORT ACTIV

## (undated) DEVICE — SUCTION COAGULATOR 10FR FOOT CNTRL MEGADYNE

## (undated) DEVICE — SUT CHROMIC 3-0 SH 27 IN G122H

## (undated) DEVICE — SYRINGE 50ML LL

## (undated) DEVICE — TUBING SUCTION 5MM X 12 FT

## (undated) DEVICE — BANDAID SHEER SPOT

## (undated) DEVICE — GLOVE PI ULTRA TOUCH SZ.6.5

## (undated) DEVICE — FORCEPS PK CUTTING 5MM 33CM

## (undated) DEVICE — DRAPE SURGIKIT SADDLE BAG LAP

## (undated) DEVICE — SYRINGE 10ML LL

## (undated) DEVICE — SPECIMEN CONTAINER STERILE PEEL PACK

## (undated) DEVICE — HEAD DONUT FOAM POSITIONER: Brand: CARDINAL HEALTH

## (undated) DEVICE — PENCIL ELECTROSURG E-Z CLEAN -0035H

## (undated) DEVICE — STERILE POLYISOPRENE POWDER-FREE SURGICAL GLOVES: Brand: PROTEXIS

## (undated) DEVICE — SUT CHROMIC 2-0 SH 27 IN G123H

## (undated) DEVICE — SUT VICRYL 2-0 SH 27 IN UNDYED J417H

## (undated) DEVICE — ELECTRODE BLADE MOD  E-Z CLEAN 6.5IN -0014M

## (undated) DEVICE — TRAY FOLEY 16FR URIMETER SURESTEP

## (undated) DEVICE — ELECTRODE BLADE MOD E-Z CLEAN 2.5IN 6.4CM -0012M

## (undated) DEVICE — TIBURON SPLIT SHEET: Brand: CONVERTORS

## (undated) DEVICE — TROCAR: Brand: KII FIOS FIRST ENTRY

## (undated) DEVICE — PAD GROUNDING ADULT

## (undated) DEVICE — IV EXTENSION TUBING 33 IN

## (undated) DEVICE — STANDARD SURGICAL GOWN, L: Brand: CONVERTORS

## (undated) DEVICE — Device: Brand: OLYMPUS

## (undated) DEVICE — PLASTIC ADHESIVE BANDAGE: Brand: CURITY

## (undated) DEVICE — INTENDED FOR TISSUE SEPARATION, AND OTHER PROCEDURES THAT REQUIRE A SHARP SURGICAL BLADE TO PUNCTURE OR CUT.: Brand: BARD-PARKER SAFETY BLADES SIZE 11, STERILE

## (undated) DEVICE — Device

## (undated) DEVICE — KIT INCLUDES:GTB14, ALEXIS CONTAINED EXT SYS 5BXCNGL3, GELPOINT MINI ADV ACCESS PLATFORM: Brand: ALEXIS CONTAINED EXTRACTION SYSTEM WITH GELPOINT MINI ADVANCED ACCESS PLATFORM

## (undated) DEVICE — BASIC PACK: Brand: CONVERTORS

## (undated) DEVICE — DRAPE EQUIPMENT RF WAND

## (undated) DEVICE — ENDOPATH 5MM CURVED SCISSORS WITH MONOPOLAR CAUTERY: Brand: ENDOPATH

## (undated) DEVICE — UTERINE MANIPULATOR 4.5MM STRL

## (undated) DEVICE — GLOVE PI ULTRA TOUCH SZ.7.0

## (undated) DEVICE — GLOVE INDICATOR PI UNDERGLOVE SZ 7 BLUE

## (undated) DEVICE — SKIN MARKER DUAL TIP WITH RULER CAP, FLEXIBLE RULER AND LABELS: Brand: DEVON

## (undated) DEVICE — SUT PLAIN 3-0 PS-1 27 IN 1640H

## (undated) DEVICE — BETHLEHEM UNIVERSAL GYN LAP PK: Brand: CARDINAL HEALTH

## (undated) DEVICE — GLOVE PI ULTRA TOUCH SZ.7.5

## (undated) DEVICE — GLOVE INDICATOR PI UNDERGLOVE SZ 7.5 BLUE

## (undated) DEVICE — LAMINECTOMY ARM CRADLE FOAM POSITIONER: Brand: CARDINAL HEALTH

## (undated) DEVICE — IRRIG ENDO FLO TUBING

## (undated) DEVICE — BLUE HEAT SCOPE WARMER

## (undated) DEVICE — SCD SEQUENTIAL COMPRESSION COMFORT SLEEVE MEDIUM KNEE LENGTH: Brand: KENDALL SCD

## (undated) DEVICE — [HIGH FLOW INSUFFLATOR,  DO NOT USE IF PACKAGE IS DAMAGED,  KEEP DRY,  KEEP AWAY FROM SUNLIGHT,  PROTECT FROM HEAT AND RADIOACTIVE SOURCES.]: Brand: PNEUMOSURE

## (undated) DEVICE — INTENDED FOR TISSUE SEPARATION, AND OTHER PROCEDURES THAT REQUIRE A SHARP SURGICAL BLADE TO PUNCTURE OR CUT.: Brand: BARD-PARKER SAFETY BLADES SIZE 10, STERILE

## (undated) DEVICE — ADHESIVE SKIN HIGH VISCOSITY EXOFIN 1ML

## (undated) DEVICE — PREMIUM DRY TRAY LF: Brand: MEDLINE INDUSTRIES, INC.

## (undated) DEVICE — 3M™ TEGADERM™ TRANSPARENT FILM DRESSING FRAME STYLE, 1626W, 4 IN X 4-3/4 IN (10 CM X 12 CM), 50/CT 4CT/CASE: Brand: 3M™ TEGADERM™

## (undated) DEVICE — SUT MONOCRYL 4-0 PS-2 27 IN Y426H

## (undated) DEVICE — 3000CC GUARDIAN II: Brand: GUARDIAN

## (undated) DEVICE — GLOVE INDICATOR PI UNDERGLOVE SZ 8 BLUE

## (undated) DEVICE — MAYO STAND COVER: Brand: CONVERTORS